# Patient Record
Sex: MALE | Race: WHITE | NOT HISPANIC OR LATINO | Employment: OTHER | URBAN - METROPOLITAN AREA
[De-identification: names, ages, dates, MRNs, and addresses within clinical notes are randomized per-mention and may not be internally consistent; named-entity substitution may affect disease eponyms.]

---

## 2021-08-04 ENCOUNTER — ANESTHESIA EVENT (OUTPATIENT)
Dept: SURGERY | Facility: MEDICAL CENTER | Age: 66
DRG: 482 | End: 2021-08-04
Payer: MEDICARE

## 2021-08-04 ENCOUNTER — HOSPITAL ENCOUNTER (INPATIENT)
Facility: MEDICAL CENTER | Age: 66
LOS: 2 days | DRG: 482 | End: 2021-08-06
Attending: EMERGENCY MEDICINE | Admitting: STUDENT IN AN ORGANIZED HEALTH CARE EDUCATION/TRAINING PROGRAM
Payer: MEDICARE

## 2021-08-04 ENCOUNTER — APPOINTMENT (OUTPATIENT)
Dept: RADIOLOGY | Facility: MEDICAL CENTER | Age: 66
DRG: 482 | End: 2021-08-04
Attending: ORTHOPAEDIC SURGERY
Payer: MEDICARE

## 2021-08-04 ENCOUNTER — APPOINTMENT (OUTPATIENT)
Dept: RADIOLOGY | Facility: MEDICAL CENTER | Age: 66
DRG: 482 | End: 2021-08-04
Attending: EMERGENCY MEDICINE
Payer: MEDICARE

## 2021-08-04 ENCOUNTER — HOSPITAL ENCOUNTER (OUTPATIENT)
Dept: RADIOLOGY | Facility: MEDICAL CENTER | Age: 66
End: 2021-08-04

## 2021-08-04 ENCOUNTER — ANESTHESIA (OUTPATIENT)
Dept: SURGERY | Facility: MEDICAL CENTER | Age: 66
DRG: 482 | End: 2021-08-04
Payer: MEDICARE

## 2021-08-04 DIAGNOSIS — S72.002A CLOSED FRACTURE OF LEFT HIP, INITIAL ENCOUNTER (HCC): ICD-10-CM

## 2021-08-04 PROBLEM — D72.829 LEUKOCYTOSIS: Status: ACTIVE | Noted: 2021-08-04

## 2021-08-04 PROBLEM — I25.810 CAD (CORONARY ARTERY DISEASE) OF ARTERY BYPASS GRAFT: Status: ACTIVE | Noted: 2021-08-04

## 2021-08-04 PROBLEM — E03.9 HYPOTHYROIDISM: Status: ACTIVE | Noted: 2021-08-04

## 2021-08-04 PROBLEM — K21.9 GERD (GASTROESOPHAGEAL REFLUX DISEASE): Status: ACTIVE | Noted: 2021-08-04

## 2021-08-04 PROBLEM — J44.9 COPD (CHRONIC OBSTRUCTIVE PULMONARY DISEASE) (HCC): Status: ACTIVE | Noted: 2021-08-04

## 2021-08-04 PROBLEM — I10 ESSENTIAL HYPERTENSION: Status: ACTIVE | Noted: 2021-08-04

## 2021-08-04 LAB
ALBUMIN SERPL BCP-MCNC: 4.1 G/DL (ref 3.2–4.9)
ALBUMIN/GLOB SERPL: 1.5 G/DL
ALP SERPL-CCNC: 137 U/L (ref 30–99)
ALT SERPL-CCNC: 13 U/L (ref 2–50)
ANION GAP SERPL CALC-SCNC: 13 MMOL/L (ref 7–16)
AST SERPL-CCNC: 18 U/L (ref 12–45)
BASOPHILS # BLD AUTO: 0.2 % (ref 0–1.8)
BASOPHILS # BLD: 0.04 K/UL (ref 0–0.12)
BILIRUB SERPL-MCNC: 0.4 MG/DL (ref 0.1–1.5)
BUN SERPL-MCNC: 8 MG/DL (ref 8–22)
CALCIUM SERPL-MCNC: 8.9 MG/DL (ref 8.5–10.5)
CHLORIDE SERPL-SCNC: 110 MMOL/L (ref 96–112)
CO2 SERPL-SCNC: 20 MMOL/L (ref 20–33)
CREAT SERPL-MCNC: 0.97 MG/DL (ref 0.5–1.4)
EKG IMPRESSION: NORMAL
EOSINOPHIL # BLD AUTO: 0.01 K/UL (ref 0–0.51)
EOSINOPHIL NFR BLD: 0 % (ref 0–6.9)
ERYTHROCYTE [DISTWIDTH] IN BLOOD BY AUTOMATED COUNT: 44.3 FL (ref 35.9–50)
GLOBULIN SER CALC-MCNC: 2.7 G/DL (ref 1.9–3.5)
GLUCOSE SERPL-MCNC: 110 MG/DL (ref 65–99)
HCT VFR BLD AUTO: 40.9 % (ref 42–52)
HGB BLD-MCNC: 13.1 G/DL (ref 14–18)
IMM GRANULOCYTES # BLD AUTO: 0.19 K/UL (ref 0–0.11)
IMM GRANULOCYTES NFR BLD AUTO: 0.9 % (ref 0–0.9)
LYMPHOCYTES # BLD AUTO: 0.73 K/UL (ref 1–4.8)
LYMPHOCYTES NFR BLD: 3.6 % (ref 22–41)
MCH RBC QN AUTO: 27.8 PG (ref 27–33)
MCHC RBC AUTO-ENTMCNC: 32 G/DL (ref 33.7–35.3)
MCV RBC AUTO: 86.8 FL (ref 81.4–97.8)
MONOCYTES # BLD AUTO: 1.02 K/UL (ref 0–0.85)
MONOCYTES NFR BLD AUTO: 5 % (ref 0–13.4)
NEUTROPHILS # BLD AUTO: 18.29 K/UL (ref 1.82–7.42)
NEUTROPHILS NFR BLD: 90.3 % (ref 44–72)
NRBC # BLD AUTO: 0 K/UL
NRBC BLD-RTO: 0 /100 WBC
PLATELET # BLD AUTO: 195 K/UL (ref 164–446)
PMV BLD AUTO: 11.1 FL (ref 9–12.9)
POTASSIUM SERPL-SCNC: 4.3 MMOL/L (ref 3.6–5.5)
PROT SERPL-MCNC: 6.8 G/DL (ref 6–8.2)
RBC # BLD AUTO: 4.71 M/UL (ref 4.7–6.1)
SODIUM SERPL-SCNC: 143 MMOL/L (ref 135–145)
WBC # BLD AUTO: 20.3 K/UL (ref 4.8–10.8)

## 2021-08-04 PROCEDURE — 27245 TREAT THIGH FRACTURE: CPT | Mod: ASROC,LT | Performed by: PHYSICIAN ASSISTANT

## 2021-08-04 PROCEDURE — 3E0T3BZ INTRODUCTION OF ANESTHETIC AGENT INTO PERIPHERAL NERVES AND PLEXI, PERCUTANEOUS APPROACH: ICD-10-PCS | Performed by: STUDENT IN AN ORGANIZED HEALTH CARE EDUCATION/TRAINING PROGRAM

## 2021-08-04 PROCEDURE — 700105 HCHG RX REV CODE 258: Performed by: STUDENT IN AN ORGANIZED HEALTH CARE EDUCATION/TRAINING PROGRAM

## 2021-08-04 PROCEDURE — 80053 COMPREHEN METABOLIC PANEL: CPT

## 2021-08-04 PROCEDURE — 302128 INFUSION PUMP: Performed by: STUDENT IN AN ORGANIZED HEALTH CARE EDUCATION/TRAINING PROGRAM

## 2021-08-04 PROCEDURE — 770006 HCHG ROOM/CARE - MED/SURG/GYN SEMI*

## 2021-08-04 PROCEDURE — 73110 X-RAY EXAM OF WRIST: CPT | Mod: RT

## 2021-08-04 PROCEDURE — 700111 HCHG RX REV CODE 636 W/ 250 OVERRIDE (IP): Performed by: STUDENT IN AN ORGANIZED HEALTH CARE EDUCATION/TRAINING PROGRAM

## 2021-08-04 PROCEDURE — 27245 TREAT THIGH FRACTURE: CPT | Mod: LT | Performed by: ORTHOPAEDIC SURGERY

## 2021-08-04 PROCEDURE — 64447 NJX AA&/STRD FEMORAL NRV IMG: CPT | Performed by: ORTHOPAEDIC SURGERY

## 2021-08-04 PROCEDURE — 700102 HCHG RX REV CODE 250 W/ 637 OVERRIDE(OP): Performed by: STUDENT IN AN ORGANIZED HEALTH CARE EDUCATION/TRAINING PROGRAM

## 2021-08-04 PROCEDURE — 160035 HCHG PACU - 1ST 60 MINS PHASE I: Performed by: ORTHOPAEDIC SURGERY

## 2021-08-04 PROCEDURE — 96374 THER/PROPH/DIAG INJ IV PUSH: CPT

## 2021-08-04 PROCEDURE — C1713 ANCHOR/SCREW BN/BN,TIS/BN: HCPCS | Performed by: ORTHOPAEDIC SURGERY

## 2021-08-04 PROCEDURE — 99223 1ST HOSP IP/OBS HIGH 75: CPT | Mod: AI | Performed by: STUDENT IN AN ORGANIZED HEALTH CARE EDUCATION/TRAINING PROGRAM

## 2021-08-04 PROCEDURE — 700101 HCHG RX REV CODE 250: Performed by: STUDENT IN AN ORGANIZED HEALTH CARE EDUCATION/TRAINING PROGRAM

## 2021-08-04 PROCEDURE — 36415 COLL VENOUS BLD VENIPUNCTURE: CPT

## 2021-08-04 PROCEDURE — 160041 HCHG SURGERY MINUTES - EA ADDL 1 MIN LEVEL 4: Performed by: ORTHOPAEDIC SURGERY

## 2021-08-04 PROCEDURE — A9270 NON-COVERED ITEM OR SERVICE: HCPCS | Performed by: STUDENT IN AN ORGANIZED HEALTH CARE EDUCATION/TRAINING PROGRAM

## 2021-08-04 PROCEDURE — 160029 HCHG SURGERY MINUTES - 1ST 30 MINS LEVEL 4: Performed by: ORTHOPAEDIC SURGERY

## 2021-08-04 PROCEDURE — 160002 HCHG RECOVERY MINUTES (STAT): Performed by: ORTHOPAEDIC SURGERY

## 2021-08-04 PROCEDURE — 85025 COMPLETE CBC W/AUTO DIFF WBC: CPT

## 2021-08-04 PROCEDURE — 160009 HCHG ANES TIME/MIN: Performed by: ORTHOPAEDIC SURGERY

## 2021-08-04 PROCEDURE — 500891 HCHG PACK, ORTHO MAJOR: Performed by: ORTHOPAEDIC SURGERY

## 2021-08-04 PROCEDURE — 73501 X-RAY EXAM HIP UNI 1 VIEW: CPT | Mod: LT

## 2021-08-04 PROCEDURE — 99285 EMERGENCY DEPT VISIT HI MDM: CPT

## 2021-08-04 PROCEDURE — 0QS736Z REPOSITION LEFT UPPER FEMUR WITH INTRAMEDULLARY INTERNAL FIXATION DEVICE, PERCUTANEOUS APPROACH: ICD-10-PCS | Performed by: ORTHOPAEDIC SURGERY

## 2021-08-04 PROCEDURE — 700111 HCHG RX REV CODE 636 W/ 250 OVERRIDE (IP): Performed by: EMERGENCY MEDICINE

## 2021-08-04 PROCEDURE — 93005 ELECTROCARDIOGRAM TRACING: CPT | Performed by: EMERGENCY MEDICINE

## 2021-08-04 PROCEDURE — 96375 TX/PRO/DX INJ NEW DRUG ADDON: CPT

## 2021-08-04 PROCEDURE — 160048 HCHG OR STATISTICAL LEVEL 1-5: Performed by: ORTHOPAEDIC SURGERY

## 2021-08-04 PROCEDURE — 501838 HCHG SUTURE GENERAL: Performed by: ORTHOPAEDIC SURGERY

## 2021-08-04 PROCEDURE — 99222 1ST HOSP IP/OBS MODERATE 55: CPT | Mod: 57 | Performed by: ORTHOPAEDIC SURGERY

## 2021-08-04 DEVICE — NAIL HIP 127.5 DEGREE 10MM X 180MM (4TX2=8): Type: IMPLANTABLE DEVICE | Site: HIP | Status: FUNCTIONAL

## 2021-08-04 DEVICE — SCREW LAG 10.5MM X 95MM (4TX2=8): Type: IMPLANTABLE DEVICE | Site: HIP | Status: FUNCTIONAL

## 2021-08-04 DEVICE — SCREW CROSS LOCK 5MM X 37.5MM (4TX5=20): Type: IMPLANTABLE DEVICE | Site: HIP | Status: FUNCTIONAL

## 2021-08-04 RX ORDER — ATORVASTATIN CALCIUM 40 MG/1
40 TABLET, FILM COATED ORAL NIGHTLY
Status: DISCONTINUED | OUTPATIENT
Start: 2021-08-04 | End: 2021-08-06 | Stop reason: HOSPADM

## 2021-08-04 RX ORDER — MEPERIDINE HYDROCHLORIDE 25 MG/ML
12.5 INJECTION INTRAMUSCULAR; INTRAVENOUS; SUBCUTANEOUS
Status: DISCONTINUED | OUTPATIENT
Start: 2021-08-04 | End: 2021-08-04 | Stop reason: HOSPADM

## 2021-08-04 RX ORDER — SODIUM CHLORIDE, SODIUM LACTATE, POTASSIUM CHLORIDE, CALCIUM CHLORIDE 600; 310; 30; 20 MG/100ML; MG/100ML; MG/100ML; MG/100ML
INJECTION, SOLUTION INTRAVENOUS CONTINUOUS
Status: DISCONTINUED | OUTPATIENT
Start: 2021-08-04 | End: 2021-08-05

## 2021-08-04 RX ORDER — BUTALBITAL, ACETAMINOPHEN AND CAFFEINE 300; 40; 50 MG/1; MG/1; MG/1
1 CAPSULE ORAL EVERY 4 HOURS PRN
Status: ON HOLD | COMMUNITY
End: 2021-08-13

## 2021-08-04 RX ORDER — ALBUTEROL SULFATE 90 UG/1
2 AEROSOL, METERED RESPIRATORY (INHALATION) EVERY 6 HOURS PRN
Status: DISCONTINUED | OUTPATIENT
Start: 2021-08-04 | End: 2021-08-06 | Stop reason: HOSPADM

## 2021-08-04 RX ORDER — CELECOXIB 200 MG/1
400 CAPSULE ORAL ONCE
Status: COMPLETED | OUTPATIENT
Start: 2021-08-04 | End: 2021-08-04

## 2021-08-04 RX ORDER — MORPHINE SULFATE 4 MG/ML
2 INJECTION, SOLUTION INTRAMUSCULAR; INTRAVENOUS
Status: DISCONTINUED | OUTPATIENT
Start: 2021-08-04 | End: 2021-08-04

## 2021-08-04 RX ORDER — MORPHINE SULFATE 4 MG/ML
2 INJECTION, SOLUTION INTRAMUSCULAR; INTRAVENOUS EVERY 6 HOURS PRN
Status: DISCONTINUED | OUTPATIENT
Start: 2021-08-05 | End: 2021-08-06

## 2021-08-04 RX ORDER — PANTOPRAZOLE SODIUM 40 MG/1
40 TABLET, DELAYED RELEASE ORAL EVERY MORNING
Status: ON HOLD | COMMUNITY
End: 2021-08-13

## 2021-08-04 RX ORDER — NITROGLYCERIN 0.4 MG/1
0.4 TABLET SUBLINGUAL
Status: DISCONTINUED | OUTPATIENT
Start: 2021-08-04 | End: 2021-08-06 | Stop reason: HOSPADM

## 2021-08-04 RX ORDER — FAMOTIDINE 40 MG/1
40 TABLET, FILM COATED ORAL EVERY EVENING
Status: ON HOLD | COMMUNITY
End: 2021-08-13 | Stop reason: SDUPTHER

## 2021-08-04 RX ORDER — ONDANSETRON 2 MG/ML
4 INJECTION INTRAMUSCULAR; INTRAVENOUS EVERY 4 HOURS PRN
Status: DISCONTINUED | OUTPATIENT
Start: 2021-08-04 | End: 2021-08-06 | Stop reason: HOSPADM

## 2021-08-04 RX ORDER — METOCLOPRAMIDE HYDROCHLORIDE 5 MG/5ML
10 SOLUTION ORAL 4 TIMES DAILY
Status: ON HOLD | COMMUNITY
End: 2021-08-13

## 2021-08-04 RX ORDER — SERTRALINE HYDROCHLORIDE 100 MG/1
100 TABLET, FILM COATED ORAL EVERY EVENING
COMMUNITY
End: 2022-11-22

## 2021-08-04 RX ORDER — ACETAMINOPHEN 500 MG
1000 TABLET ORAL ONCE
Status: COMPLETED | OUTPATIENT
Start: 2021-08-04 | End: 2021-08-04

## 2021-08-04 RX ORDER — METOPROLOL TARTRATE 50 MG/1
50 TABLET, FILM COATED ORAL 2 TIMES DAILY
Status: DISCONTINUED | OUTPATIENT
Start: 2021-08-04 | End: 2021-08-06 | Stop reason: HOSPADM

## 2021-08-04 RX ORDER — HYDROMORPHONE HYDROCHLORIDE 1 MG/ML
0.4 INJECTION, SOLUTION INTRAMUSCULAR; INTRAVENOUS; SUBCUTANEOUS
Status: DISCONTINUED | OUTPATIENT
Start: 2021-08-04 | End: 2021-08-04 | Stop reason: HOSPADM

## 2021-08-04 RX ORDER — CLONIDINE HYDROCHLORIDE 0.1 MG/1
0.1 TABLET ORAL EVERY 6 HOURS PRN
Status: DISCONTINUED | OUTPATIENT
Start: 2021-08-04 | End: 2021-08-06 | Stop reason: HOSPADM

## 2021-08-04 RX ORDER — DIPHENHYDRAMINE HYDROCHLORIDE 50 MG/ML
12.5 INJECTION INTRAMUSCULAR; INTRAVENOUS
Status: DISCONTINUED | OUTPATIENT
Start: 2021-08-04 | End: 2021-08-04 | Stop reason: HOSPADM

## 2021-08-04 RX ORDER — OMEPRAZOLE 20 MG/1
20 CAPSULE, DELAYED RELEASE ORAL EVERY MORNING
Status: DISCONTINUED | OUTPATIENT
Start: 2021-08-04 | End: 2021-08-06 | Stop reason: HOSPADM

## 2021-08-04 RX ORDER — HYDRALAZINE HYDROCHLORIDE 20 MG/ML
5 INJECTION INTRAMUSCULAR; INTRAVENOUS
Status: DISCONTINUED | OUTPATIENT
Start: 2021-08-04 | End: 2021-08-04 | Stop reason: HOSPADM

## 2021-08-04 RX ORDER — ISOSORBIDE MONONITRATE 60 MG/1
60 TABLET, EXTENDED RELEASE ORAL 2 TIMES DAILY
Status: DISCONTINUED | OUTPATIENT
Start: 2021-08-04 | End: 2021-08-06 | Stop reason: HOSPADM

## 2021-08-04 RX ORDER — CYCLOBENZAPRINE HCL 10 MG
10 TABLET ORAL 3 TIMES DAILY PRN
Status: ON HOLD | COMMUNITY
End: 2021-08-13 | Stop reason: SDUPTHER

## 2021-08-04 RX ORDER — OXYCODONE HCL 5 MG/5 ML
10 SOLUTION, ORAL ORAL
Status: COMPLETED | OUTPATIENT
Start: 2021-08-04 | End: 2021-08-04

## 2021-08-04 RX ORDER — TAMSULOSIN HYDROCHLORIDE 0.4 MG/1
0.4 CAPSULE ORAL EVERY EVENING
Status: ON HOLD | COMMUNITY
End: 2021-08-13 | Stop reason: SDUPTHER

## 2021-08-04 RX ORDER — POTASSIUM CHLORIDE 20 MEQ/1
20 TABLET, EXTENDED RELEASE ORAL EVERY MORNING
Status: ON HOLD | COMMUNITY
End: 2021-08-13

## 2021-08-04 RX ORDER — PROCHLORPERAZINE EDISYLATE 5 MG/ML
5-10 INJECTION INTRAMUSCULAR; INTRAVENOUS EVERY 4 HOURS PRN
Status: DISCONTINUED | OUTPATIENT
Start: 2021-08-04 | End: 2021-08-06 | Stop reason: HOSPADM

## 2021-08-04 RX ORDER — EPINEPHRINE 0.3 MG/.3ML
0.3 INJECTION SUBCUTANEOUS PRN
COMMUNITY

## 2021-08-04 RX ORDER — ONDANSETRON 4 MG/1
4 TABLET, ORALLY DISINTEGRATING ORAL EVERY 4 HOURS PRN
Status: DISCONTINUED | OUTPATIENT
Start: 2021-08-04 | End: 2021-08-06 | Stop reason: HOSPADM

## 2021-08-04 RX ORDER — HALOPERIDOL 5 MG/ML
1 INJECTION INTRAMUSCULAR
Status: DISCONTINUED | OUTPATIENT
Start: 2021-08-04 | End: 2021-08-04 | Stop reason: HOSPADM

## 2021-08-04 RX ORDER — LABETALOL HYDROCHLORIDE 5 MG/ML
5 INJECTION, SOLUTION INTRAVENOUS
Status: DISCONTINUED | OUTPATIENT
Start: 2021-08-04 | End: 2021-08-04 | Stop reason: HOSPADM

## 2021-08-04 RX ORDER — TAMSULOSIN HYDROCHLORIDE 0.4 MG/1
0.4 CAPSULE ORAL EVERY EVENING
Status: DISCONTINUED | OUTPATIENT
Start: 2021-08-04 | End: 2021-08-06 | Stop reason: HOSPADM

## 2021-08-04 RX ORDER — PROMETHAZINE HYDROCHLORIDE 25 MG/1
12.5-25 SUPPOSITORY RECTAL EVERY 4 HOURS PRN
Status: DISCONTINUED | OUTPATIENT
Start: 2021-08-04 | End: 2021-08-06 | Stop reason: HOSPADM

## 2021-08-04 RX ORDER — MORPHINE SULFATE 15 MG/1
15 TABLET ORAL 3 TIMES DAILY
Status: ON HOLD | COMMUNITY
End: 2021-08-13 | Stop reason: SDUPTHER

## 2021-08-04 RX ORDER — IPRATROPIUM BROMIDE AND ALBUTEROL SULFATE 2.5; .5 MG/3ML; MG/3ML
3 SOLUTION RESPIRATORY (INHALATION)
Status: DISCONTINUED | OUTPATIENT
Start: 2021-08-04 | End: 2021-08-04 | Stop reason: HOSPADM

## 2021-08-04 RX ORDER — ONDANSETRON 2 MG/ML
4 INJECTION INTRAMUSCULAR; INTRAVENOUS
Status: DISCONTINUED | OUTPATIENT
Start: 2021-08-04 | End: 2021-08-04 | Stop reason: HOSPADM

## 2021-08-04 RX ORDER — AMOXICILLIN 250 MG
2 CAPSULE ORAL 2 TIMES DAILY
Status: DISCONTINUED | OUTPATIENT
Start: 2021-08-05 | End: 2021-08-06 | Stop reason: HOSPADM

## 2021-08-04 RX ORDER — ONDANSETRON 2 MG/ML
INJECTION INTRAMUSCULAR; INTRAVENOUS PRN
Status: DISCONTINUED | OUTPATIENT
Start: 2021-08-04 | End: 2021-08-04 | Stop reason: SURG

## 2021-08-04 RX ORDER — LABETALOL HYDROCHLORIDE 5 MG/ML
10 INJECTION, SOLUTION INTRAVENOUS EVERY 4 HOURS PRN
Status: DISCONTINUED | OUTPATIENT
Start: 2021-08-04 | End: 2021-08-06 | Stop reason: HOSPADM

## 2021-08-04 RX ORDER — NITROGLYCERIN 0.4 MG/1
0.4 TABLET SUBLINGUAL
COMMUNITY
End: 2021-12-29 | Stop reason: SDUPTHER

## 2021-08-04 RX ORDER — GABAPENTIN 100 MG/1
100 CAPSULE ORAL 2 TIMES DAILY
COMMUNITY

## 2021-08-04 RX ORDER — CLONIDINE HYDROCHLORIDE 0.1 MG/1
0.1 TABLET ORAL EVERY MORNING
Status: DISCONTINUED | OUTPATIENT
Start: 2021-08-05 | End: 2021-08-06 | Stop reason: HOSPADM

## 2021-08-04 RX ORDER — ACETAMINOPHEN 325 MG/1
650 TABLET ORAL 3 TIMES DAILY
Status: DISCONTINUED | OUTPATIENT
Start: 2021-08-04 | End: 2021-08-06 | Stop reason: HOSPADM

## 2021-08-04 RX ORDER — LEVOTHYROXINE SODIUM 0.05 MG/1
50 TABLET ORAL
COMMUNITY
End: 2021-12-29

## 2021-08-04 RX ORDER — LEVOTHYROXINE SODIUM 0.03 MG/1
50 TABLET ORAL SEE ADMIN INSTRUCTIONS
COMMUNITY
End: 2022-01-03

## 2021-08-04 RX ORDER — METOPROLOL TARTRATE 50 MG/1
50 TABLET, FILM COATED ORAL 2 TIMES DAILY
Status: ON HOLD | COMMUNITY
End: 2021-08-13 | Stop reason: SDUPTHER

## 2021-08-04 RX ORDER — DEXAMETHASONE SODIUM PHOSPHATE 4 MG/ML
INJECTION, SOLUTION INTRA-ARTICULAR; INTRALESIONAL; INTRAMUSCULAR; INTRAVENOUS; SOFT TISSUE PRN
Status: DISCONTINUED | OUTPATIENT
Start: 2021-08-04 | End: 2021-08-04 | Stop reason: SURG

## 2021-08-04 RX ORDER — ROPIVACAINE HYDROCHLORIDE 5 MG/ML
INJECTION, SOLUTION EPIDURAL; INFILTRATION; PERINEURAL
Status: DISCONTINUED | OUTPATIENT
Start: 2021-08-04 | End: 2021-08-04 | Stop reason: SURG

## 2021-08-04 RX ORDER — ALBUTEROL SULFATE 90 UG/1
2 AEROSOL, METERED RESPIRATORY (INHALATION) EVERY 6 HOURS PRN
COMMUNITY

## 2021-08-04 RX ORDER — HYDROMORPHONE HYDROCHLORIDE 1 MG/ML
0.2 INJECTION, SOLUTION INTRAMUSCULAR; INTRAVENOUS; SUBCUTANEOUS
Status: DISCONTINUED | OUTPATIENT
Start: 2021-08-04 | End: 2021-08-04 | Stop reason: HOSPADM

## 2021-08-04 RX ORDER — FAMOTIDINE 20 MG/1
40 TABLET, FILM COATED ORAL EVERY EVENING
Status: DISCONTINUED | OUTPATIENT
Start: 2021-08-04 | End: 2021-08-06 | Stop reason: HOSPADM

## 2021-08-04 RX ORDER — SERTRALINE HYDROCHLORIDE 100 MG/1
100 TABLET, FILM COATED ORAL EVERY EVENING
Status: DISCONTINUED | OUTPATIENT
Start: 2021-08-04 | End: 2021-08-06 | Stop reason: HOSPADM

## 2021-08-04 RX ORDER — GABAPENTIN 300 MG/1
600 CAPSULE ORAL ONCE
Status: COMPLETED | OUTPATIENT
Start: 2021-08-04 | End: 2021-08-04

## 2021-08-04 RX ORDER — POLYETHYLENE GLYCOL 3350 17 G/17G
1 POWDER, FOR SOLUTION ORAL
Status: DISCONTINUED | OUTPATIENT
Start: 2021-08-04 | End: 2021-08-06 | Stop reason: HOSPADM

## 2021-08-04 RX ORDER — CLOPIDOGREL BISULFATE 75 MG/1
75 TABLET ORAL EVERY MORNING
Status: ON HOLD | COMMUNITY
End: 2021-08-13 | Stop reason: SDUPTHER

## 2021-08-04 RX ORDER — BISACODYL 10 MG
10 SUPPOSITORY, RECTAL RECTAL
Status: DISCONTINUED | OUTPATIENT
Start: 2021-08-04 | End: 2021-08-06 | Stop reason: HOSPADM

## 2021-08-04 RX ORDER — ISOSORBIDE MONONITRATE 60 MG/1
60 TABLET, EXTENDED RELEASE ORAL 2 TIMES DAILY
Status: ON HOLD | COMMUNITY
End: 2021-08-13 | Stop reason: SDUPTHER

## 2021-08-04 RX ORDER — BUTALBITAL, ACETAMINOPHEN AND CAFFEINE 50; 325; 40 MG/1; MG/1; MG/1
1 TABLET ORAL EVERY 4 HOURS PRN
Status: DISCONTINUED | OUTPATIENT
Start: 2021-08-04 | End: 2021-08-06 | Stop reason: HOSPADM

## 2021-08-04 RX ORDER — ATORVASTATIN CALCIUM 40 MG/1
40 TABLET, FILM COATED ORAL NIGHTLY
Status: ON HOLD | COMMUNITY
End: 2021-08-13 | Stop reason: SDUPTHER

## 2021-08-04 RX ORDER — CLONIDINE HYDROCHLORIDE 0.1 MG/1
0.1 TABLET ORAL EVERY MORNING
Status: ON HOLD | COMMUNITY
End: 2021-08-13 | Stop reason: SDUPTHER

## 2021-08-04 RX ORDER — PROMETHAZINE HYDROCHLORIDE 25 MG/1
12.5-25 TABLET ORAL EVERY 4 HOURS PRN
Status: DISCONTINUED | OUTPATIENT
Start: 2021-08-04 | End: 2021-08-06 | Stop reason: HOSPADM

## 2021-08-04 RX ORDER — HYDROMORPHONE HYDROCHLORIDE 1 MG/ML
0.1 INJECTION, SOLUTION INTRAMUSCULAR; INTRAVENOUS; SUBCUTANEOUS
Status: DISCONTINUED | OUTPATIENT
Start: 2021-08-04 | End: 2021-08-04 | Stop reason: HOSPADM

## 2021-08-04 RX ORDER — LEVOTHYROXINE SODIUM 0.05 MG/1
50 TABLET ORAL
Status: DISCONTINUED | OUTPATIENT
Start: 2021-08-07 | End: 2021-08-06 | Stop reason: HOSPADM

## 2021-08-04 RX ORDER — ENALAPRILAT 1.25 MG/ML
1.25 INJECTION INTRAVENOUS EVERY 6 HOURS PRN
Status: DISCONTINUED | OUTPATIENT
Start: 2021-08-04 | End: 2021-08-06 | Stop reason: HOSPADM

## 2021-08-04 RX ORDER — OXYCODONE HCL 5 MG/5 ML
5 SOLUTION, ORAL ORAL
Status: COMPLETED | OUTPATIENT
Start: 2021-08-04 | End: 2021-08-04

## 2021-08-04 RX ORDER — KETAMINE HYDROCHLORIDE 50 MG/ML
INJECTION, SOLUTION INTRAMUSCULAR; INTRAVENOUS PRN
Status: DISCONTINUED | OUTPATIENT
Start: 2021-08-04 | End: 2021-08-04 | Stop reason: SURG

## 2021-08-04 RX ORDER — CEFAZOLIN SODIUM 1 G/3ML
INJECTION, POWDER, FOR SOLUTION INTRAMUSCULAR; INTRAVENOUS PRN
Status: DISCONTINUED | OUTPATIENT
Start: 2021-08-04 | End: 2021-08-04 | Stop reason: SURG

## 2021-08-04 RX ORDER — LEVOTHYROXINE SODIUM 0.03 MG/1
25 TABLET ORAL
Status: DISCONTINUED | OUTPATIENT
Start: 2021-08-05 | End: 2021-08-06 | Stop reason: HOSPADM

## 2021-08-04 RX ADMIN — FAMOTIDINE 40 MG: 20 TABLET ORAL at 18:21

## 2021-08-04 RX ADMIN — FENTANYL CITRATE 50 MCG: 50 INJECTION INTRAMUSCULAR; INTRAVENOUS at 22:16

## 2021-08-04 RX ADMIN — OXYCODONE HYDROCHLORIDE 10 MG: 5 SOLUTION ORAL at 22:14

## 2021-08-04 RX ADMIN — FENTANYL CITRATE 50 MCG: 50 INJECTION, SOLUTION INTRAMUSCULAR; INTRAVENOUS at 20:46

## 2021-08-04 RX ADMIN — MIDAZOLAM 2 MG: 1 INJECTION INTRAMUSCULAR; INTRAVENOUS at 21:13

## 2021-08-04 RX ADMIN — ROPIVACAINE HYDROCHLORIDE 30 ML: 5 INJECTION, SOLUTION EPIDURAL; INFILTRATION; PERINEURAL at 21:47

## 2021-08-04 RX ADMIN — PROPOFOL 100 MG: 10 INJECTION, EMULSION INTRAVENOUS at 21:14

## 2021-08-04 RX ADMIN — MORPHINE SULFATE 2 MG: 4 INJECTION INTRAVENOUS at 18:19

## 2021-08-04 RX ADMIN — DEXAMETHASONE SODIUM PHOSPHATE 4 MG: 4 INJECTION, SOLUTION INTRA-ARTICULAR; INTRALESIONAL; INTRAMUSCULAR; INTRAVENOUS; SOFT TISSUE at 21:23

## 2021-08-04 RX ADMIN — KETAMINE HYDROCHLORIDE 40 MG: 50 INJECTION INTRAMUSCULAR; INTRAVENOUS at 21:14

## 2021-08-04 RX ADMIN — ROCURONIUM BROMIDE 50 MG: 10 INJECTION, SOLUTION INTRAVENOUS at 21:14

## 2021-08-04 RX ADMIN — GABAPENTIN 600 MG: 300 CAPSULE ORAL at 20:49

## 2021-08-04 RX ADMIN — METOPROLOL TARTRATE 50 MG: 50 TABLET, FILM COATED ORAL at 18:21

## 2021-08-04 RX ADMIN — SODIUM CHLORIDE, POTASSIUM CHLORIDE, SODIUM LACTATE AND CALCIUM CHLORIDE: 600; 310; 30; 20 INJECTION, SOLUTION INTRAVENOUS at 18:23

## 2021-08-04 RX ADMIN — FENTANYL CITRATE 100 MCG: 50 INJECTION, SOLUTION INTRAMUSCULAR; INTRAVENOUS at 21:13

## 2021-08-04 RX ADMIN — FENTANYL CITRATE 50 MCG: 50 INJECTION, SOLUTION INTRAMUSCULAR; INTRAVENOUS at 16:35

## 2021-08-04 RX ADMIN — CEFAZOLIN 2 G: 330 INJECTION, POWDER, FOR SOLUTION INTRAMUSCULAR; INTRAVENOUS at 21:15

## 2021-08-04 RX ADMIN — FENTANYL CITRATE 50 MCG: 50 INJECTION INTRAMUSCULAR; INTRAVENOUS at 22:26

## 2021-08-04 RX ADMIN — ONDANSETRON 4 MG: 2 INJECTION INTRAMUSCULAR; INTRAVENOUS at 21:23

## 2021-08-04 RX ADMIN — OMEPRAZOLE 20 MG: 20 CAPSULE, DELAYED RELEASE ORAL at 18:21

## 2021-08-04 RX ADMIN — ACETAMINOPHEN 1000 MG: 500 TABLET, FILM COATED ORAL at 20:49

## 2021-08-04 RX ADMIN — CELECOXIB 400 MG: 200 CAPSULE ORAL at 20:49

## 2021-08-04 ASSESSMENT — COGNITIVE AND FUNCTIONAL STATUS - GENERAL
DAILY ACTIVITIY SCORE: 19
MOVING FROM LYING ON BACK TO SITTING ON SIDE OF FLAT BED: A LOT
TURNING FROM BACK TO SIDE WHILE IN FLAT BAD: A LITTLE
HELP NEEDED FOR BATHING: A LITTLE
DRESSING REGULAR LOWER BODY CLOTHING: A LOT
SUGGESTED CMS G CODE MODIFIER DAILY ACTIVITY: CK
SUGGESTED CMS G CODE MODIFIER MOBILITY: CL
TOILETING: A LITTLE
STANDING UP FROM CHAIR USING ARMS: A LOT
DRESSING REGULAR UPPER BODY CLOTHING: A LITTLE
MOVING TO AND FROM BED TO CHAIR: A LOT
CLIMB 3 TO 5 STEPS WITH RAILING: A LOT
WALKING IN HOSPITAL ROOM: A LOT
MOBILITY SCORE: 13

## 2021-08-04 ASSESSMENT — PAIN DESCRIPTION - PAIN TYPE
TYPE: ACUTE PAIN
TYPE: ACUTE PAIN
TYPE: ACUTE PAIN;SURGICAL PAIN

## 2021-08-04 ASSESSMENT — LIFESTYLE VARIABLES
HOW MANY TIMES IN THE PAST YEAR HAVE YOU HAD 5 OR MORE DRINKS IN A DAY: 0
CONSUMPTION TOTAL: NEGATIVE
AVERAGE NUMBER OF DAYS PER WEEK YOU HAVE A DRINK CONTAINING ALCOHOL: 0
ON A TYPICAL DAY WHEN YOU DRINK ALCOHOL HOW MANY DRINKS DO YOU HAVE: 0
HAVE PEOPLE ANNOYED YOU BY CRITICIZING YOUR DRINKING: NO
TOTAL SCORE: 0
ALCOHOL_USE: NO
EVER HAD A DRINK FIRST THING IN THE MORNING TO STEADY YOUR NERVES TO GET RID OF A HANGOVER: NO
TOTAL SCORE: 0
TOTAL SCORE: 0
HAVE YOU EVER FELT YOU SHOULD CUT DOWN ON YOUR DRINKING: NO
EVER FELT BAD OR GUILTY ABOUT YOUR DRINKING: NO

## 2021-08-04 ASSESSMENT — ENCOUNTER SYMPTOMS
PSYCHIATRIC NEGATIVE: 1
MUSCULOSKELETAL NEGATIVE: 1
CARDIOVASCULAR NEGATIVE: 1
RESPIRATORY NEGATIVE: 1
NEUROLOGICAL NEGATIVE: 1
GASTROINTESTINAL NEGATIVE: 1
EYES NEGATIVE: 1
CONSTITUTIONAL NEGATIVE: 1

## 2021-08-04 ASSESSMENT — PATIENT HEALTH QUESTIONNAIRE - PHQ9
SUM OF ALL RESPONSES TO PHQ QUESTIONS 1-9: 8
3. TROUBLE FALLING OR STAYING ASLEEP OR SLEEPING TOO MUCH: SEVERAL DAYS
1. LITTLE INTEREST OR PLEASURE IN DOING THINGS: NEARLY EVERY DAY
2. FEELING DOWN, DEPRESSED, IRRITABLE, OR HOPELESS: SEVERAL DAYS
9. THOUGHTS THAT YOU WOULD BE BETTER OFF DEAD, OR OF HURTING YOURSELF: NOT AT ALL
4. FEELING TIRED OR HAVING LITTLE ENERGY: SEVERAL DAYS
6. FEELING BAD ABOUT YOURSELF - OR THAT YOU ARE A FAILURE OR HAVE LET YOURSELF OR YOUR FAMILY DOWN: NOT AL ALL
5. POOR APPETITE OR OVEREATING: SEVERAL DAYS
7. TROUBLE CONCENTRATING ON THINGS, SUCH AS READING THE NEWSPAPER OR WATCHING TELEVISION: SEVERAL DAYS
SUM OF ALL RESPONSES TO PHQ9 QUESTIONS 1 AND 2: 4
8. MOVING OR SPEAKING SO SLOWLY THAT OTHER PEOPLE COULD HAVE NOTICED. OR THE OPPOSITE, BEING SO FIGETY OR RESTLESS THAT YOU HAVE BEEN MOVING AROUND A LOT MORE THAN USUAL: NOT AT ALL

## 2021-08-04 ASSESSMENT — FIBROSIS 4 INDEX: FIB4 SCORE: 1.64

## 2021-08-04 NOTE — ED NOTES
Pharmacy Medication Reconciliation      ~Medication reconciliation updated and complete per pt at bedside with medication list. Reviewed list with pt and returned at bedside  ~Allergies have been verified and updated   ~No oral ABX within the last 30 days  ~Patient home pharmacy:[facility]

## 2021-08-04 NOTE — H&P
Hospital Medicine History & Physical Note    Date of Service  8/4/2021    Primary Care Physician  No primary care provider on file.    Consultants  Orthopedic surgery (Dr. Ríos)    Code Status  Full Code    Chief Complaint  Chief Complaint   Patient presents with   • T-5000 GLF   • Hip Injury       History of Presenting Illness  66-year-old male with a past medical history of hypothyroidism, CAD on aspirin and Plavix, COPD, and hypertension was transferred to emergency department from [facility] on 8/4/2021 where he presented after ground-level fall and was noted for left hip fracture.  Patient is ambulating his walker and his doctor informed him which caused him to fall.  He tripped over his feet and into his left hip.  No loss of consciousness or head trauma.     At the emergency department, stable vital signs and patient saturating well room air.  EDP discussed case with orthopedic surgery (Dr. Ríos), which patient is scheduled for surgical repair today.  Patient was admitted for left femur fracture requiring surgical repair.    Review of Systems  Review of Systems   Constitutional: Negative.    HENT: Negative.    Eyes: Negative.    Respiratory: Negative.    Cardiovascular: Negative.    Gastrointestinal: Negative.    Genitourinary: Negative.    Musculoskeletal: Negative.    Skin: Negative.    Neurological: Negative.    Endo/Heme/Allergies: Negative.    Psychiatric/Behavioral: Negative.        Past Medical History   has a past medical history of Asthma, Back pain, BPH (benign prostatic hyperplasia), Chronic obstructive pulmonary disease (HCC), Hypertension, and Hypothyroid.    Surgical History   has a past surgical history that includes cardiac cath, right/left heart.     Family History  family history is not on file.   Family history reviewed with patient. There is no family history that is pertinent to the chief complaint.     Social History   reports that he has quit smoking. He has never used smokeless  tobacco. He reports previous alcohol use. He reports previous drug use.    Allergies  Allergies   Allergen Reactions   • Bee Venom Anaphylaxis       Medications  Prior to Admission Medications   Prescriptions Last Dose Informant Patient Reported? Taking?   EPINEPHrine (EPIPEN 2-VEL) 0.3 MG/0.3ML Solution Auto-injector solution for injection PRN at PRN  Yes Yes   Sig: Inject 0.3 mg into the shoulder, thigh, or buttocks as needed.   acetaminophen/caffeine/butalbital 300-40-50 mg (FIORICET) -40 MG Cap capsule 8/3/2021 at PM  Yes Yes   Sig: Take 1 capsule by mouth every four hours as needed for Headache.   albuterol 108 (90 Base) MCG/ACT Aero Soln inhalation aerosol LAST WEEK at PRN  Yes Yes   Sig: Inhale 2 Puffs every 6 hours as needed for Shortness of Breath.   atorvastatin (LIPITOR) 40 MG Tab 8/3/2021 at PM  Yes Yes   Sig: Take 40 mg by mouth every evening.   cloNIDine (CATAPRES) 0.1 MG Tab 8/4/2021 at 0600  Yes Yes   Sig: Take 0.1 mg by mouth every morning.   cyclobenzaprine (FLEXERIL) 10 mg Tab 8/4/2021 at 0600  Yes Yes   Sig: Take 10 mg by mouth 3 times a day as needed for Moderate Pain.   famotidine (PEPCID) 40 MG Tab 8/3/2021 at PM  Yes Yes   Sig: Take 40 mg by mouth every evening.   gabapentin (NEURONTIN) 100 MG Cap 8/4/2021 at 0600  Yes Yes   Sig: Take 100 mg by mouth 4 times a day.   isosorbide mononitrate SR (IMDUR) 60 MG TABLET SR 24 HR 8/4/2021 at 0600  Yes Yes   Sig: Take 60 mg by mouth 2 times a day.   levothyroxine (SYNTHROID) 25 MCG Tab 8/4/2021 at 0600  Yes Yes   Sig: Take 25 mcg by mouth see administration instructions. Monday thru Friday only   levothyroxine (SYNTHROID) 50 MCG Tab 8/1/2021 at UNK  Yes Yes   Sig: Take 50 mcg by mouth two times a week. Saturday & Sunday Only   metoclopramide (REGLAN) 5 MG/5ML Solution 8/4/2021 at 0600  Yes Yes   Sig: Take 10 mg by mouth 4 times a day.   metoprolol tartrate (LOPRESSOR) 50 MG Tab 8/4/2021 at 0600  Yes Yes   Sig: Take 50 mg by mouth 2 times a day.    morphine (MS IR) 15 MG tablet 8/4/2021 at 0700  Yes Yes   Sig: Take 15 mg by mouth in the morning, at noon, and at bedtime.   nitroglycerin (NITROSTAT) 0.4 MG SL Tab 8/1/2021 at PRN  Yes Yes   Sig: Place 0.4 mg under the tongue every 5 minutes as needed for Chest Pain.   pantoprazole (PROTONIX) 40 MG Tablet Delayed Response 8/4/2021 at 0600  Yes Yes   Sig: Take 40 mg by mouth every morning.   potassium chloride SA (KDUR) 20 MEQ Tab CR 8/4/2021 at 0600  Yes Yes   Sig: Take 20 mEq by mouth every morning.   sertraline (ZOLOFT) 100 MG Tab 8/3/2021 at PM  Yes Yes   Sig: Take 100 mg by mouth every evening.   sertraline (ZOLOFT) 50 MG Tab 8/4/2021 at 0600  Yes Yes   Sig: Take 50 mg by mouth every morning.   tamsulosin (FLOMAX) 0.4 MG capsule 8/3/2021 at PM  Yes Yes   Sig: Take 0.4 mg by mouth every evening.      Facility-Administered Medications: None       Physical Exam  Temp:  [37.1 °C (98.8 °F)] 37.1 °C (98.8 °F)  Pulse:  [78-81] 78  Resp:  [16-20] 20  BP: (126-175)/(72-74) 126/74  SpO2:  [92 %-94 %] 94 %    Physical Exam  Constitutional:       General: He is not in acute distress.     Appearance: Normal appearance. He is not ill-appearing, toxic-appearing or diaphoretic.   HENT:      Head: Normocephalic and atraumatic.      Nose: Nose normal. No congestion.      Mouth/Throat:      Mouth: Mucous membranes are moist.   Eyes:      Extraocular Movements: Extraocular movements intact.      Pupils: Pupils are equal, round, and reactive to light.   Cardiovascular:      Rate and Rhythm: Normal rate and regular rhythm.      Pulses: Normal pulses.      Heart sounds: Normal heart sounds.   Pulmonary:      Effort: Pulmonary effort is normal.      Breath sounds: Normal breath sounds.   Abdominal:      General: Bowel sounds are normal. There is no distension.      Palpations: Abdomen is soft.      Tenderness: There is no abdominal tenderness. There is no guarding or rebound.   Musculoskeletal:         General: No swelling. Normal  range of motion.      Cervical back: Normal range of motion and neck supple.      Comments: Right hip tender to palpation  Limited passive/active ROM secondary to pain  Left leg showed and externally rotated   Skin:     General: Skin is warm.      Coloration: Skin is not jaundiced.   Neurological:      General: No focal deficit present.      Mental Status: He is alert and oriented to person, place, and time. Mental status is at baseline.      Cranial Nerves: No cranial nerve deficit.   Psychiatric:         Mood and Affect: Mood normal.         Behavior: Behavior normal.         Thought Content: Thought content normal.         Judgment: Judgment normal.         Laboratory:  Recent Labs     08/04/21  1642   WBC 20.3*   RBC 4.71   HEMOGLOBIN 13.1*   HEMATOCRIT 40.9*   MCV 86.8   MCH 27.8   MCHC 32.0*   RDW 44.3   PLATELETCT 195   MPV 11.1         No results for input(s): ALTSGPT, ASTSGOT, ALKPHOSPHAT, TBILIRUBIN, DBILIRUBIN, GAMMAGT, AMYLASE, LIPASE, ALB, PREALBUMIN, GLUCOSE in the last 72 hours.      No results for input(s): NTPROBNP in the last 72 hours.      No results for input(s): TROPONINT in the last 72 hours.    Imaging:  DX-WRIST-COMPLETE 3+ RIGHT   Final Result         No acute osseous abnormality.      OUTSIDE IMAGES-DX LOWER EXTREMITY, LEFT   Final Result          Assessment/Plan:  I anticipate this patient will require at least two midnights for appropriate medical management, necessitating inpatient admission.    * Closed left hip fracture (HCC)- (present on admission)  Assessment & Plan  Noted on outside facility x-ray  EDP discussed case with orthopedic surgery (Dr. Ríos), which patient is scheduled for OR tonight  Admit to surgical floor  Keep n.p.o.  Scheduled Tylenol  As needed morphine  Labs in a.m.     Leukocytosis- (present on admission)  Assessment & Plan  Reactive and due to acute trauma    GERD (gastroesophageal reflux disease)- (present on admission)  Assessment & Plan  Continue home  Pepcid and PPI    CAD (coronary artery disease) of artery bypass graft- (present on admission)  Assessment & Plan  CAD status post CABG and 5 stents as per patient  Status post pacemaker placement  Continue home metoprolol  Continue home Imdur  As needed nitroglycerin  Hold Plavix for now    Essential hypertension- (present on admission)  Assessment & Plan  Continue home clonidine and metoprolol  As needed antihypertensives    COPD (chronic obstructive pulmonary disease) ([facility])- (present on admission)  Assessment & Plan  Stable  Continue home inhalers    Hypothyroidism- (present on admission)  Assessment & Plan  Continue home levothyroxine      VTE prophylaxis: SCDs/TEDs

## 2021-08-04 NOTE — ED TRIAGE NOTES
Pt arrives as a transfer form [place] for complaints of left proximal femur fracture 2nd to a GLF today. He has intact CMS to leg. Received 1 of dilaudid, 50 mcg fentynal and 126 mg ketamin PTA

## 2021-08-04 NOTE — ED PROVIDER NOTES
ED Provider Note    CHIEF COMPLAINT  Chief Complaint   Patient presents with   • T-5000 GLF   • Hip Injury       HPI  Antony Akbar is a 66 y.o. male who presents with left hip fracture.  Patient sustained hip fracture today after mechanical ground-level fall.  He was seen in [place] at their clinic, found to have this hip fracture and transferred to our facility for further care.  Patient reports that he generally ambulates with a walker, his dog ran out in front of him so he let go of his walker and try to walk down a couple of stairs, tripped over his feet and fell onto his left hip.  He also reports some mild thumb pain on his right hand.  Patient denies any head strike, nausea or vomiting or neck or back pain.  Patient remembers the entire event, he denies any loss of consciousness.  He is on Plavix.    REVIEW OF SYSTEMS  ROS  See HPI for further details. All other systems are negative.     PAST MEDICAL HISTORY   has a past medical history of Asthma, Back pain, BPH (benign prostatic hyperplasia), Chronic obstructive pulmonary disease (HCC), Hypertension, and Hypothyroid.    SOCIAL HISTORY  Social History     Tobacco Use   • Smoking status: Former Smoker   • Smokeless tobacco: Never Used   Vaping Use   • Vaping Use: Never used   Substance and Sexual Activity   • Alcohol use: Not Currently   • Drug use: Not Currently   • Sexual activity: Not on file       SURGICAL HISTORY   has a past surgical history that includes cardiac cath, right/left heart.    CURRENT MEDICATIONS  Home Medications    **Home medications have not yet been reviewed for this encounter**         ALLERGIES  No Known Allergies    PHYSICAL EXAM  Vitals:    08/04/21 1529   BP: (!) 175/72   Pulse:    Resp:    Temp:    SpO2:        Physical Exam  Constitutional:       Appearance: He is well-developed.   Eyes:      Conjunctiva/sclera: Conjunctivae normal.   Pulmonary:      Effort: Pulmonary effort is normal.   Musculoskeletal:      Cervical back:  Normal range of motion and neck supple.      Comments: Pain on logroll of left hip.  Left lower extremity is mildly shortened.  Distal pulses are 2+.  Right first digit with some mild pain base of the first metacarpal.  No tenderness at the anatomical snuffbox.   Skin:     General: Skin is warm.   Neurological:      Mental Status: He is alert and oriented to person, place, and time.   Psychiatric:         Behavior: Behavior normal.           COURSE & MEDICAL DECISION MAKING  Pertinent Labs & Imaging studies reviewed. (See chart for details)    Patient with known left hip fracture.  Sent from outside hospital.  Patient will basic labs checked for preop.  We will also check EKG for preop.  I also asked.  Patient's risk given his associated tenderness here.  I believe scaphoid fracture is unlikely.  Patient x-rays reviewed, he does appear to have a very faint lucency at the femoral neck  I discussed the case with orthopedics who reviewed the images and likely take patient to the OR tonight.  Patient made n.p.o.     The patient will return for worsening symptoms and is stable at the time of discharge. The patient verbalizes understanding and will comply.    FINAL IMPRESSION  1.  Left hip fracture      Electronically signed by: Rodri Valdivia M.D., 8/4/2021 3:38 PM

## 2021-08-05 LAB
ALBUMIN SERPL BCP-MCNC: 3.4 G/DL (ref 3.2–4.9)
ALBUMIN/GLOB SERPL: 1.3 G/DL
ALP SERPL-CCNC: 112 U/L (ref 30–99)
ALT SERPL-CCNC: 12 U/L (ref 2–50)
ANION GAP SERPL CALC-SCNC: 10 MMOL/L (ref 7–16)
AST SERPL-CCNC: 19 U/L (ref 12–45)
BASOPHILS # BLD AUTO: 0.3 % (ref 0–1.8)
BASOPHILS # BLD: 0.04 K/UL (ref 0–0.12)
BILIRUB SERPL-MCNC: 0.4 MG/DL (ref 0.1–1.5)
BUN SERPL-MCNC: 10 MG/DL (ref 8–22)
CALCIUM SERPL-MCNC: 8.5 MG/DL (ref 8.5–10.5)
CHLORIDE SERPL-SCNC: 108 MMOL/L (ref 96–112)
CO2 SERPL-SCNC: 23 MMOL/L (ref 20–33)
CREAT SERPL-MCNC: 0.92 MG/DL (ref 0.5–1.4)
EOSINOPHIL # BLD AUTO: 0 K/UL (ref 0–0.51)
EOSINOPHIL NFR BLD: 0 % (ref 0–6.9)
ERYTHROCYTE [DISTWIDTH] IN BLOOD BY AUTOMATED COUNT: 43.9 FL (ref 35.9–50)
GLOBULIN SER CALC-MCNC: 2.7 G/DL (ref 1.9–3.5)
GLUCOSE SERPL-MCNC: 108 MG/DL (ref 65–99)
HCT VFR BLD AUTO: 36.1 % (ref 42–52)
HGB BLD-MCNC: 11.3 G/DL (ref 14–18)
IMM GRANULOCYTES # BLD AUTO: 0.08 K/UL (ref 0–0.11)
IMM GRANULOCYTES NFR BLD AUTO: 0.5 % (ref 0–0.9)
LYMPHOCYTES # BLD AUTO: 0.98 K/UL (ref 1–4.8)
LYMPHOCYTES NFR BLD: 6.6 % (ref 22–41)
MCH RBC QN AUTO: 27.4 PG (ref 27–33)
MCHC RBC AUTO-ENTMCNC: 31.3 G/DL (ref 33.7–35.3)
MCV RBC AUTO: 87.6 FL (ref 81.4–97.8)
MONOCYTES # BLD AUTO: 1.03 K/UL (ref 0–0.85)
MONOCYTES NFR BLD AUTO: 7 % (ref 0–13.4)
NEUTROPHILS # BLD AUTO: 12.67 K/UL (ref 1.82–7.42)
NEUTROPHILS NFR BLD: 85.6 % (ref 44–72)
NRBC # BLD AUTO: 0 K/UL
NRBC BLD-RTO: 0 /100 WBC
PLATELET # BLD AUTO: 146 K/UL (ref 164–446)
PMV BLD AUTO: 10.6 FL (ref 9–12.9)
POTASSIUM SERPL-SCNC: 4.5 MMOL/L (ref 3.6–5.5)
PROT SERPL-MCNC: 6.1 G/DL (ref 6–8.2)
RBC # BLD AUTO: 4.12 M/UL (ref 4.7–6.1)
SODIUM SERPL-SCNC: 141 MMOL/L (ref 135–145)
WBC # BLD AUTO: 14.8 K/UL (ref 4.8–10.8)

## 2021-08-05 PROCEDURE — 97162 PT EVAL MOD COMPLEX 30 MIN: CPT

## 2021-08-05 PROCEDURE — 770006 HCHG ROOM/CARE - MED/SURG/GYN SEMI*

## 2021-08-05 PROCEDURE — A9270 NON-COVERED ITEM OR SERVICE: HCPCS | Performed by: PHYSICIAN ASSISTANT

## 2021-08-05 PROCEDURE — 700105 HCHG RX REV CODE 258: Performed by: STUDENT IN AN ORGANIZED HEALTH CARE EDUCATION/TRAINING PROGRAM

## 2021-08-05 PROCEDURE — A9270 NON-COVERED ITEM OR SERVICE: HCPCS | Performed by: HOSPITALIST

## 2021-08-05 PROCEDURE — 36415 COLL VENOUS BLD VENIPUNCTURE: CPT

## 2021-08-05 PROCEDURE — 85025 COMPLETE CBC W/AUTO DIFF WBC: CPT

## 2021-08-05 PROCEDURE — A9270 NON-COVERED ITEM OR SERVICE: HCPCS | Performed by: STUDENT IN AN ORGANIZED HEALTH CARE EDUCATION/TRAINING PROGRAM

## 2021-08-05 PROCEDURE — 700102 HCHG RX REV CODE 250 W/ 637 OVERRIDE(OP): Performed by: PHYSICIAN ASSISTANT

## 2021-08-05 PROCEDURE — 80053 COMPREHEN METABOLIC PANEL: CPT

## 2021-08-05 PROCEDURE — 700102 HCHG RX REV CODE 250 W/ 637 OVERRIDE(OP): Performed by: STUDENT IN AN ORGANIZED HEALTH CARE EDUCATION/TRAINING PROGRAM

## 2021-08-05 PROCEDURE — 94664 DEMO&/EVAL PT USE INHALER: CPT

## 2021-08-05 PROCEDURE — 700102 HCHG RX REV CODE 250 W/ 637 OVERRIDE(OP): Performed by: HOSPITALIST

## 2021-08-05 PROCEDURE — 99233 SBSQ HOSP IP/OBS HIGH 50: CPT | Performed by: HOSPITALIST

## 2021-08-05 PROCEDURE — 700111 HCHG RX REV CODE 636 W/ 250 OVERRIDE (IP): Performed by: ORTHOPAEDIC SURGERY

## 2021-08-05 PROCEDURE — 97166 OT EVAL MOD COMPLEX 45 MIN: CPT

## 2021-08-05 RX ORDER — OXYCODONE HYDROCHLORIDE 10 MG/1
10 TABLET ORAL EVERY 6 HOURS PRN
Status: DISCONTINUED | OUTPATIENT
Start: 2021-08-05 | End: 2021-08-06 | Stop reason: HOSPADM

## 2021-08-05 RX ORDER — OXYCODONE HYDROCHLORIDE 10 MG/1
10 TABLET ORAL EVERY 6 HOURS PRN
Status: DISCONTINUED | OUTPATIENT
Start: 2021-08-05 | End: 2021-08-05

## 2021-08-05 RX ORDER — OXYCODONE HYDROCHLORIDE 5 MG/1
5 TABLET ORAL EVERY 6 HOURS PRN
Status: DISCONTINUED | OUTPATIENT
Start: 2021-08-05 | End: 2021-08-05

## 2021-08-05 RX ORDER — CLOPIDOGREL BISULFATE 75 MG/1
75 TABLET ORAL DAILY
Status: DISCONTINUED | OUTPATIENT
Start: 2021-08-05 | End: 2021-08-06 | Stop reason: HOSPADM

## 2021-08-05 RX ORDER — OXYCODONE HYDROCHLORIDE 5 MG/1
5 TABLET ORAL EVERY 6 HOURS PRN
Status: DISCONTINUED | OUTPATIENT
Start: 2021-08-05 | End: 2021-08-06 | Stop reason: HOSPADM

## 2021-08-05 RX ADMIN — TAMSULOSIN HYDROCHLORIDE 0.4 MG: 0.4 CAPSULE ORAL at 17:42

## 2021-08-05 RX ADMIN — MORPHINE SULFATE 2 MG: 4 INJECTION INTRAVENOUS at 05:30

## 2021-08-05 RX ADMIN — DOCUSATE SODIUM 50 MG AND SENNOSIDES 8.6 MG 2 TABLET: 8.6; 5 TABLET, FILM COATED ORAL at 17:41

## 2021-08-05 RX ADMIN — METOPROLOL TARTRATE 50 MG: 50 TABLET, FILM COATED ORAL at 17:43

## 2021-08-05 RX ADMIN — ACETAMINOPHEN 650 MG: 325 TABLET, FILM COATED ORAL at 14:50

## 2021-08-05 RX ADMIN — CLONIDINE HYDROCHLORIDE 0.1 MG: 0.1 TABLET ORAL at 09:03

## 2021-08-05 RX ADMIN — MORPHINE SULFATE 2 MG: 4 INJECTION INTRAVENOUS at 22:42

## 2021-08-05 RX ADMIN — ACETAMINOPHEN 650 MG: 325 TABLET, FILM COATED ORAL at 09:02

## 2021-08-05 RX ADMIN — DOCUSATE SODIUM 50 MG AND SENNOSIDES 8.6 MG 2 TABLET: 8.6; 5 TABLET, FILM COATED ORAL at 05:21

## 2021-08-05 RX ADMIN — OMEPRAZOLE 20 MG: 20 CAPSULE, DELAYED RELEASE ORAL at 05:21

## 2021-08-05 RX ADMIN — ISOSORBIDE MONONITRATE 60 MG: 60 TABLET, EXTENDED RELEASE ORAL at 17:44

## 2021-08-05 RX ADMIN — ATORVASTATIN CALCIUM 40 MG: 40 TABLET, FILM COATED ORAL at 20:32

## 2021-08-05 RX ADMIN — METOPROLOL TARTRATE 50 MG: 50 TABLET, FILM COATED ORAL at 05:20

## 2021-08-05 RX ADMIN — SODIUM CHLORIDE, POTASSIUM CHLORIDE, SODIUM LACTATE AND CALCIUM CHLORIDE: 600; 310; 30; 20 INJECTION, SOLUTION INTRAVENOUS at 05:20

## 2021-08-05 RX ADMIN — FAMOTIDINE 40 MG: 20 TABLET ORAL at 17:42

## 2021-08-05 RX ADMIN — ISOSORBIDE MONONITRATE 60 MG: 60 TABLET, EXTENDED RELEASE ORAL at 09:03

## 2021-08-05 RX ADMIN — OXYCODONE HYDROCHLORIDE 10 MG: 10 TABLET ORAL at 11:22

## 2021-08-05 RX ADMIN — ACETAMINOPHEN 650 MG: 325 TABLET, FILM COATED ORAL at 20:33

## 2021-08-05 RX ADMIN — MORPHINE SULFATE 2 MG: 4 INJECTION INTRAVENOUS at 14:58

## 2021-08-05 RX ADMIN — OXYCODONE HYDROCHLORIDE 10 MG: 10 TABLET ORAL at 17:47

## 2021-08-05 RX ADMIN — SERTRALINE HYDROCHLORIDE 50 MG: 50 TABLET ORAL at 05:21

## 2021-08-05 RX ADMIN — OXYCODONE HYDROCHLORIDE 10 MG: 10 TABLET ORAL at 20:40

## 2021-08-05 RX ADMIN — CLOPIDOGREL BISULFATE 75 MG: 75 TABLET ORAL at 13:16

## 2021-08-05 RX ADMIN — SERTRALINE HYDROCHLORIDE 100 MG: 100 TABLET ORAL at 17:41

## 2021-08-05 RX ADMIN — LEVOTHYROXINE SODIUM 25 MCG: 0.03 TABLET ORAL at 05:20

## 2021-08-05 ASSESSMENT — PAIN DESCRIPTION - PAIN TYPE
TYPE: SURGICAL PAIN
TYPE: SURGICAL PAIN
TYPE: ACUTE PAIN;SURGICAL PAIN
TYPE: SURGICAL PAIN
TYPE: ACUTE PAIN;SURGICAL PAIN
TYPE: ACUTE PAIN;SURGICAL PAIN
TYPE: SURGICAL PAIN

## 2021-08-05 ASSESSMENT — GAIT ASSESSMENTS
DISTANCE (FEET): 12
ASSISTIVE DEVICE: FRONT WHEEL WALKER
DEVIATION: ANTALGIC;SHUFFLED GAIT;STEP TO
GAIT LEVEL OF ASSIST: MAXIMAL ASSIST

## 2021-08-05 ASSESSMENT — COGNITIVE AND FUNCTIONAL STATUS - GENERAL
TOILETING: A LOT
DRESSING REGULAR UPPER BODY CLOTHING: A LITTLE
STANDING UP FROM CHAIR USING ARMS: A LOT
TURNING FROM BACK TO SIDE WHILE IN FLAT BAD: UNABLE
MOVING TO AND FROM BED TO CHAIR: UNABLE
SUGGESTED CMS G CODE MODIFIER MOBILITY: CM
WALKING IN HOSPITAL ROOM: A LOT
MOVING FROM LYING ON BACK TO SITTING ON SIDE OF FLAT BED: UNABLE
SUGGESTED CMS G CODE MODIFIER DAILY ACTIVITY: CK
DRESSING REGULAR LOWER BODY CLOTHING: A LOT
PERSONAL GROOMING: A LITTLE
HELP NEEDED FOR BATHING: A LOT
CLIMB 3 TO 5 STEPS WITH RAILING: TOTAL
DAILY ACTIVITIY SCORE: 16
MOBILITY SCORE: 8

## 2021-08-05 ASSESSMENT — ENCOUNTER SYMPTOMS
CHILLS: 0
ABDOMINAL PAIN: 0
FEVER: 0
PALPITATIONS: 0
VOMITING: 0
NAUSEA: 0
COUGH: 0
HEADACHES: 0
SHORTNESS OF BREATH: 0
DIZZINESS: 0

## 2021-08-05 ASSESSMENT — PAIN SCALES - GENERAL: PAIN_LEVEL: 6

## 2021-08-05 ASSESSMENT — ACTIVITIES OF DAILY LIVING (ADL): TOILETING: INDEPENDENT

## 2021-08-05 NOTE — ASSESSMENT & PLAN NOTE
CAD status post CABG and 5 stents as per patient  Status post pacemaker placement  Continue home metoprolol, plavix,  Imdur  As needed nitroglycerin

## 2021-08-05 NOTE — ANESTHESIA PREPROCEDURE EVALUATION
Angina q weekly per patient    Relevant Problems   PULMONARY   (positive) COPD (chronic obstructive pulmonary disease) (HCC)      CARDIAC   (positive) CAD (coronary artery disease) of artery bypass graft   (positive) Essential hypertension      GI   (positive) GERD (gastroesophageal reflux disease)      ENDO   (positive) Hypothyroidism       Physical Exam    Airway   Mallampati: II  TM distance: >3 FB  Neck ROM: full       Cardiovascular - normal exam  Rhythm: regular  Rate: normal  (-) murmur     Dental - normal exam           Pulmonary - normal exam  Breath sounds clear to auscultation     Abdominal    Neurological - normal exam                 Anesthesia Plan    ASA 3- EMERGENT   ASA physical status 3 criteria: CAD/stents (> 3 months)ASA physical status emergent criteria: compromised vital organ, limb or tissue and displaced fracture with possible neurovascular compromise    Plan - general       Airway plan will be ETT          Induction: intravenous    Postoperative Plan: Postoperative administration of opioids is intended.    Pertinent diagnostic labs and testing reviewed    Informed Consent:    Anesthetic plan and risks discussed with patient.    Use of blood products discussed with: patient whom consented to blood products.

## 2021-08-05 NOTE — THERAPY
Occupational Therapy   Initial Evaluation     Patient Name: Antony Akbar  Age:  66 y.o., Sex:  male  Medical Record #: [id number]  Today's Date: 8/5/2021     Precautions  Precautions: Fall Risk, Non Weight Bearing Left Lower Extremity  Comments: B knee buckling    Assessment  Patient is 66 y.o. male admitted after GLF s/p L femur IM nail. LOB x5 in standing w/ fww w/knees buckling req max A to correct. Reports wife can assist PRN, but she works 8hrs/day (4-11am) and pt is alone. Currently limited by decreased functional mobility, activity tolerance, balance, LLE numbness, and pain which are currently affecting pt's ability to complete ADLs/IADLs at baseline. Will continue to follow.     Plan    Recommend Occupational Therapy 4 times per week until therapy goals are met for the following treatments:  Adaptive Equipment, Neuro Re-Education / Balance, Self Care/Activities of Daily Living, Therapeutic Activities and Therapeutic Exercises.    DC Equipment Recommendations: Tub Transfer Bench, Unable to determine at this time  Discharge Recommendations: Recommend post-acute placement for additional occupational therapy services prior to discharge home. PM&R consult     Objective     08/05/21 1116   Total Time Spent   Total Time Spent (Mins) 27   Charge Group   OT Evaluation OT Evaluation Mod   Initial Contact Note    Initial Contact Note Order Received and Verified, Occupational Therapy Evaluation in Progress with Full Report to Follow.   Prior Living Situation   Prior Services Home-Independent   Housing / Facility [address] [facility]   Steps Into Home   (ramp)   Bathroom Set up Bathtub / Shower Combination;Shower Chair   Equipment Owned Front-Wheel Walker;4-Wheel Walker;Power Wheel Chair;Tub / Shower Seat   Lives with - Patient's Self Care Capacity Spouse   Comments Reports wife can assist PRN, but she works 8hrs/day. 4-11am   Prior Level of ADL Function   Self Feeding Independent   Grooming / Hygiene Requires Assist   Bathing  Requires Assist   Dressing Requires Assist   Toileting Independent   Prior Level of IADL Function   Medication Management Requires Assist   Laundry Requires Assist   Kitchen Mobility Requires Assist   Finances Requires Assist   Home Management Dependent   Shopping Dependent   Prior Level Of Mobility Independent With Device in Home;Uses Wheel Chair for Community Mobility   History of Falls   History of Falls Yes   Date of Last Fall   (reason for admit)   Precautions   Precautions Fall Risk;Non Weight Bearing Left Lower Extremity   Comments B knee buckling   Pain 0 - 10 Group   Location Hip   Location Orientation Left   Therapist Pain Assessment Post Activity;During Activity;Nurse Notified  (not quantified)   Cognition    Cognition / Consciousness WDL   Level of Consciousness Alert   Comments pleasant and cooperative; good insight into deficits   Passive ROM Upper Body   Passive ROM Upper Body WDL   Active ROM Upper Body   Active ROM Upper Body  WDL   Strength Upper Body   Upper Body Strength  WDL   Balance Assessment   Sitting Balance (Static) Fair +   Sitting Balance (Dynamic) Fair   Standing Balance (Static) Poor   Standing Balance (Dynamic) Poor   Weight Shift Sitting Fair   Weight Shift Standing Poor   Comments x5 LOBs in standing w/knees buckling req max A to correct   Bed Mobility    Supine to Sit Minimal Assist   Sit to Supine Moderate Assist   Scooting Minimal Assist   Comments HOB elevated   ADL Assessment   Grooming   (declined)   Lower Body Dressing Maximal Assist   Toileting Moderate Assist  (thomas)   Comments reports gets assist at baseline for LB ADLs and bathing   How much help from another person does the patient currently need...   Putting on and taking off regular lower body clothing? 2   Bathing (including washing, rinsing, and drying)? 2   Toileting, which includes using a toilet, bedpan, or urinal? 2   Putting on and taking off regular upper body clothing? 3   Taking care of personal grooming  such as brushing teeth? 3   Eating meals? 4   6 Clicks Daily Activity Score 16   Functional Mobility   Sit to Stand Moderate Assist   Bed, Chair, Wheelchair Transfer Moderate Assist   Toilet Transfers Moderate Assist  (would use BSC for BM)   Transfer Method Stand Step   Mobility w/ FWW; bed<>toilet   Comments reports LLE is numb and can only feel pressure   Edema / Skin Assessment   Edema / Skin  Not Assessed   Activity Tolerance   Comments limited by fatigue/pain and balance   Patient / Family Goals   Patient / Family Goal #1 to go home   Short Term Goals   Short Term Goal # 1 toilet txf with SPV   Short Term Goal # 2 toileting with SPV   Short Term Goal # 3 standing g/h with SPV   Education Group   Education Provided Role of Occupational Therapist;Transfers;Activities of Daily Living;Pathology of bedrest   Role of Occupational Therapist Patient Response Patient;Acceptance;Explanation;Verbal Demonstration;Reinforcement Needed   Transfers Patient Response Patient;Acceptance;Explanation;Reinforcement Needed;Demonstration;Verbal Demonstration   ADL Patient Response Patient;Acceptance;Explanation;Demonstration;Verbal Demonstration;Reinforcement Needed   Pathology of Bedrest Patient Response Patient;Acceptance;Explanation;Verbal Demonstration;Reinforcement Needed

## 2021-08-05 NOTE — CARE PLAN
The patient is Stable - Low risk of patient condition declining or worsening    Shift Goals  Clinical Goals: Pain management for ambulation  Patient Goals: To get leg to move    Progress made toward(s) clinical / shift goals:  Patient having some pain; meds given before transferring to this unit. Dressing in place; intact, old drainage noted. IV fluids. VSS. Will continue to monitor.    Patient is not progressing towards the following goals:      Problem: Pain - Standard  Goal: Alleviation of pain or a reduction in pain to the patient’s comfort goal  Outcome: Progressing     Problem: Knowledge Deficit - Standard  Goal: Patient and family/care givers will demonstrate understanding of plan of care, disease process/condition, diagnostic tests and medications  Outcome: Progressing     Problem: Fall Risk  Goal: Patient will remain free from falls  Outcome: Progressing

## 2021-08-05 NOTE — PROGRESS NOTES
1030: Spoke with MD Flores about patient possible discharge with home health or outpatient therapy services. Reached out to PT/OT for evaluations.

## 2021-08-05 NOTE — ANESTHESIA POSTPROCEDURE EVALUATION
Patient: Antony Akbar    Procedure Summary     Date: 08/04/21 Room / Location: [place] [address] / SURGERY [facility]    Anesthesia Start: 2106 Anesthesia Stop: 2154    Procedure: INSERTION, INTRAMEDULLARY MIKAEL, FEMUR, PROXIMAL (Left Hip) Diagnosis: (Left intertrochanteric femur fracture)    Surgeons: Valentin Ríos M.D. Responsible Provider: Juma May M.D.    Anesthesia Type: general ASA Status: 3 - Emergent          Final Anesthesia Type: general  Last vitals  BP   Blood Pressure: 125/70    Temp   36.5 °C (97.7 °F)    Pulse   75   Resp   13    SpO2   96 %      Anesthesia Post Evaluation    Patient location during evaluation: PACU  Patient participation: complete - patient participated  Level of consciousness: awake and alert  Pain score: 6    Airway patency: patent  Anesthetic complications: no  Cardiovascular status: hemodynamically stable  Respiratory status: acceptable  Hydration status: euvolemic    PONV: none          No complications documented.     Nurse Pain Score: 6 (NPRS)

## 2021-08-05 NOTE — ANESTHESIA PROCEDURE NOTES
Arterial Line  Performed by: Juma May M.D.  Authorized by: Juma May M.D.     Start Time:  8/4/2021 9:13 PM  End Time:  8/4/2021 9:15 PM  Localization: surface landmarks    Patient Location:  OR  Indication: continuous blood pressure monitoring        Catheter Size:  20 G  Seldinger Technique?: Yes    Laterality:  Left  Site:  Radial artery  Line Secured:  Antimicrobial disc, tape, transparent dressing and benzoin  Events: patient tolerated procedure well with no complications

## 2021-08-05 NOTE — DISCHARGE PLANNING
Anticipated Discharge Disposition: SNF    Action: LSW met with pt at bedside to obtain SNF choice. Pt requested referrals be sent to the followin) [facility]  2) [facility]  3) [facility]  4) [facility]    LSW faxed choice form to Zafar BELTRAN.    Barriers to Discharge: SNF acceptance.    Plan: Care coordination will follow up with SNF referrals.

## 2021-08-05 NOTE — OR NURSING
2153 Pt from OR, asleep, with O2 support of 6 L/min via mask, respirations regular and spontaneous, vital signs within normal limits. Pt post op site at left hip/leg, 2 sites, both dressings clean-dry and intact. SCDs ON, gurney set to lowest position and locked.    2215 Pt verbalized pain, medicated per MAR. Pt denies nausea, tolerated oral intake.    2225 Updated [place] () thru telephone call.    2255 Report given to Leeanne ARREDONDO.    2302 VSS, A&Ox4. Pt to room with transport and O2 support of 2 L/min via nasal cannula (O2 level > 3/4).

## 2021-08-05 NOTE — DISCHARGE PLANNING
Received Choice form at 3925  Agency/Facility Name: [facility], [facility], [facility]  Referral sent per Choice form @ [phone]

## 2021-08-05 NOTE — CARE PLAN
The patient is Stable - Low risk of patient condition declining or worsening    Shift Goals  Clinical Goals: Ambulate, pain management  Patient Goals: Ambulate, pain management    Progress made toward(s) clinical / shift goals:  The patient was seen by PT and OT to assess his mobility. The patient also discussed his plan of care with the MD who is collaborating with case management to find an appropriate facility for discharge/transfer.       Problem: Knowledge Deficit - Standard  Goal: Patient and family/care givers will demonstrate understanding of plan of care, disease process/condition, diagnostic tests and medications  Outcome: Progressing     Problem: Fall Risk  Goal: Patient will remain free from falls  Outcome: Progressing

## 2021-08-05 NOTE — HOSPITAL COURSE
66-year-old male with past medical history of CAD, hypothyroidism, COPD, hypertension presenting after ground-level fall which resulted in left intertrochanteric femur fracture.  Orthopedics was consulted and patient underwent intramedullary device placement.  Patient is currently pending PT/OT.

## 2021-08-05 NOTE — PROGRESS NOTES
Hospital Medicine Daily Progress Note    Date of Service  8/5/2021    Chief Complaint  Antony Akbar is a 66 y.o. male admitted 8/4/2021 with GLF and hip pain    Hospital Course  66-year-old male with past medical history of CAD, hypothyroidism, COPD, hypertension presenting after ground-level fall which resulted in left intertrochanteric femur fracture.  Orthopedics was consulted and patient underwent intramedullary device placement.  Patient is currently pending PT/OT.      Interval Problem Update  REMI overnight   Tolerated surgery well  His hip pain is tolerable  He is in an electric scooter at baseline and lives with his partner who can assist him if needed    I have personally seen and examined the patient at bedside. I discussed the plan of care with patient, bedside RN, charge RN and .    Consultants/Specialty  orthopedics    Code Status  Full Code    Disposition  Patient is medically cleared pending PT/OT.   Anticipate discharge to  vs SNF.  I have placed the appropriate orders for post-discharge needs.    Review of Systems  Review of Systems   Constitutional: Negative for chills and fever.   Respiratory: Negative for cough and shortness of breath.    Cardiovascular: Negative for chest pain and palpitations.   Gastrointestinal: Negative for abdominal pain, nausea and vomiting.   Genitourinary: Negative for dysuria and urgency.   Musculoskeletal: Positive for joint pain.   Neurological: Negative for dizziness and headaches.   All other systems reviewed and are negative.       Physical Exam  Temp:  [35.9 °C (96.6 °F)-37.6 °C (99.7 °F)] 37.6 °C (99.7 °F)  Pulse:  [64-82] 76  Resp:  [13-27] 18  BP: (109-175)/(66-91) 151/78  SpO2:  [92 %-100 %] 99 %    Physical Exam  Vitals and nursing note reviewed.   Constitutional:       Appearance: Normal appearance.   Cardiovascular:      Rate and Rhythm: Normal rate and regular rhythm.      Heart sounds: No murmur heard.     Pulmonary:      Effort: Pulmonary  effort is normal. No respiratory distress.      Breath sounds: Normal breath sounds.   Musculoskeletal:      Comments: Dressing over left hip   Neurological:      General: No focal deficit present.      Mental Status: He is alert and oriented to person, place, and time.         Fluids    Intake/Output Summary (Last 24 hours) at 8/5/2021 1242  Last data filed at 8/4/2021 2300  Gross per 24 hour   Intake 550 ml   Output 10 ml   Net 540 ml       Laboratory  Recent Labs     08/04/21  1642 08/05/21  0628   WBC 20.3* 14.8*   RBC 4.71 4.12*   HEMOGLOBIN 13.1* 11.3*   HEMATOCRIT 40.9* 36.1*   MCV 86.8 87.6   MCH 27.8 27.4   MCHC 32.0* 31.3*   RDW 44.3 43.9   PLATELETCT 195 146*   MPV 11.1 10.6     Recent Labs     08/04/21 1642 08/05/21  0628   SODIUM 143 141   POTASSIUM 4.3 4.5   CHLORIDE 110 108   CO2 20 23   GLUCOSE 110* 108*   BUN 8 10   CREATININE 0.97 0.92   CALCIUM 8.9 8.5                   Imaging  DX-PORTABLE FLUORO > 1 HOUR   Final Result      Portable fluoroscopy utilized for 30 seconds.         INTERPRETING LOCATION: [address] [place] [address], [id number]      DX-WRIST-COMPLETE 3+ RIGHT   Final Result         No acute osseous abnormality.      OUTSIDE IMAGES-DX LOWER EXTREMITY, LEFT   Final Result      DX-HIP-UNILATERAL-WITHOUT PELVIS-1 VIEW LEFT    (Results Pending)        Assessment/Plan  * Closed left hip fracture (HCC)- (present on admission)  Assessment & Plan  Noted on outside facility x-ray  S/p surgery   Pending PT/OT    Leukocytosis- (present on admission)  Assessment & Plan  Reactive and due to acute trauma    GERD (gastroesophageal reflux disease)- (present on admission)  Assessment & Plan  Continue home Pepcid and PPI    CAD (coronary artery disease) of artery bypass graft- (present on admission)  Assessment & Plan  CAD status post CABG and 5 stents as per patient  Status post pacemaker placement  Continue home metoprolol, plavix,  Imdur  As needed nitroglycerin    Essential hypertension- (present on  admission)  Assessment & Plan  Continue home clonidine and metoprolol  As needed antihypertensives    COPD (chronic obstructive pulmonary disease) (HCC)- (present on admission)  Assessment & Plan  Stable  Continue home inhalers    Hypothyroidism- (present on admission)  Assessment & Plan  Continue home levothyroxine       VTE prophylaxis: SCDs/TEDs    I have performed a physical exam and reviewed and updated ROS and Plan today (8/5/2021). In review of yesterday's note (8/4/2021), there are no changes except as documented above.

## 2021-08-05 NOTE — ANESTHESIA TIME REPORT
Anesthesia Start and Stop Event Times     Date Time Event    8/4/2021 2058 Ready for Procedure     2106 Anesthesia Start     2154 Anesthesia Stop        Responsible Staff  08/04/21    Name Role Begin End    Juma aMy M.D. Anesth 2106 2154        Preop Diagnosis (Free Text):  Pre-op Diagnosis     Left intertrochanteric femur fracture        Preop Diagnosis (Codes):    Post op Diagnosis  Displaced intertrochanteric fracture of left femur      Premium Reason  B. 1st Call    Comments:

## 2021-08-05 NOTE — OP REPORT
DATE OF OPERATION: 8/4/2021     PREOPERATIVE DIAGNOSIS: Left intertrochanteric femur fracture    POSTOPERATIVE DIAGNOSIS: Same    PROCEDURE PERFORMED: Surgical treatment of left intertrochanteric femur fracture with intramedullary device    SURGEON: Valentin Ríos M.D.     ASSISTANT: Ar Sahu PA-C    ANESTHESIOLOGIST:     ANESTHESIA: General    ESTIMATED BLOOD LOSS: 50 mL    INDICATIONS: The patient is a 66 y.o. male with a left intertrochanteric femur fracture resulting from a ground level fall.  The patient denies antecedent pain, and was found to have a normal neurovascular exam and skin envelope.  Radiographs reviewed by myself demonstrated the intertrochanteric femur fracture.  Given these findings, surgical treatment of the intertrochanteric fracture with an intramedullary device was indicated.  I discussed the risks and benefits of the procedure, including the risks of infection, wound healing complication, neurovascular injury, persistent hip pain, malunion, non-union, malrotation, and the medical risks of anesthesia including DVT, PE, MI, stroke, and death.  Benefits include early mobilization, improved chance of union, and reduction in the medical risks of hip fractures.  Alternatives to surgery were also discussed, including non-operative management.  The patient signed the informed consent and the operative extremity was marked.      PROCEDURE:  The patient underwent anesthesia, and was positioned supine on the fracture table and all bony prominences were well padded.  Preoperative antibiotics were administered. Sequential compression devices were employed.  Preoperative imaging was obtained, demonstrated reduction of the fracture using the table. The correct operative site was prepped and draped into a sterile field. A procedural pause was conducted to verify correct patient, correct extremity, presence of the surgeons initials on the operative extremity.    The guide pin for the [facility] hip nail  was placed into the tip of the greater trochanter and advanced under fluoroscopic guidance to the appropriate position. An incision was made around the pin, and the entry reamer was then used to gain access to the femoral canal.  The guide pin was then removed.    A long ball-tip guide wire was advanced down the femur to the knee, its position confirmed on fluoroscopy.  We then measured for, and selected a 127.5 x 11 x 180 OIC hip nail.  We then sequentially reamed to a size 12.5 mm reamer, and advanced the nail over the guide pin to an appropriate depth, confirmed by fluoroscopy.    The soft tissue sleeve for the lag screw was then advanced down to bone through a lateral thigh incision.  The guide pin was advanced to a central position within the femoral neck/head, confirmed by fluoroscopy.  We measured our lag screw, and reamed for our lag screw.  We then placed the lag screw by hand.  The fracture was compressed, and the set screw was locked proximally.  All instruments were removed from the proximal femur, and final fluoroscopic images obtained.    We then obtained perfect Quechan imaging distally, and placed one statically interlocked distal screw, obtaining good bicortical purchase.    All wounds were irrigated  with normal saline, and closed in layers, with 2-0 Vicryl in the subcutaneous tissue, and staples in the skin.  Sterile dressings were applied.       The patient tolerated the procedure well. There were no apparent complications. All sponge, needle, and instrument counts were correct on two separate occasions. He was awakened, extubated, and transferred to the recovery room in satisfactory condition.     The use of Bam Waldron as a surgical assistant was necessary for assistance with exposure, retraction, fracture reduction, instrumentation, and closure.    Post-Operative Plan:    1.  The patient should bear weight as tolerated on their operative extremity.  Gait aids (crutch or crutches, cane,  walker) may be used as needed, and may be discontinued when no longer required.  2.  IV antibiotics - may be continued for 24 hours, but are not required.  3.  DVT prophylaxis - SCD's and Lovenox 40 mg SQ daily while inpatient.  The patient may transition to Aspirin 325 mg PO BID as an outpatient  4.  Discharge planning  ____________________________________   Valentin Ríos M.D.   DD: 8/4/2021  9:51 PM

## 2021-08-05 NOTE — RESPIRATORY CARE
"COPD EDUCATION by COPD CLINICAL EDUCATOR  8/5/2021  at  3:16 PM by Adeola Churchill, RRT     Patient interviewed by COPD education team.  Patient unable to participate in full program.  A short intervention has been conducted.  A comprehensive packet including information about COPD, types of treatments to manage their disease and safe home Oxygen usage was provided and reviewed with patient at the bedside.     COPD Assessment  COPD Clinical Specialists ONLY  COPD Education Initiated: Yes--Short Intervention ([facility] connected Asthma/COPD overlap on O2 prn at home)  DME Company: [employer] (patient was unclear)  DME Equipment Type: Oxygen  Physician Name: Dr Romero in [place], [place]  Referrals Initiated: Yes (Lives in [place], [place] out of service area)  Pulmonary Rehab: No  Smoking Cessation: No  Hospice: N/A  Home Health Care: Yes (anticipated SNF/HH at discharge)  [facility] Outreach: N/A  [facility]: N/A  [facility]: Declined  Is this a COPD exacerbation patient?: No  $ Demo/Eval of SVN's, MDI's and Aerosols: Yes (spacer instruction)  (OP) Pulmonary Function Testing: Yes (another state)    Meds to Beds  Would the patient like to opt in for Bedside Medication Delivery at Discharge?: Yes, interested     MY COPD ACTION PLAN     It is recommended that patients and physicians /healthcare providers complete this action plan together. This plan should be discussed at each physician visit and updated as needed.    The green, yellow and red zones show groups of symptoms of COPD. This list of symptoms is not comprehensive, and you may experience other symptoms. In the \"Actions\" column, your healthcare provider has recommended actions for you to take based on your symptoms.    Patient Name: Antony Akbar   YOB: 1955   Last Updated on:     Green Zone:  I am doing well today Actions   •  Usual activitiy and exercise level •  Take daily medications   •  Usual amounts of cough and phlegm/mucus •  " "Use oxygen as prescribed   •  Sleep well at night •  Continue regular exercise/diet plan   •  Appetite is good •  At all times avoid cigarette smoke, inhaled irritants     Daily Medications (these medications are taken every day):                Yellow Zone:  I am having a bad day or a COPD flare Actions   •  More breathless than usual •  Continue daily medications   •  I have less energy for my daily activities •  Use quick relief inhaler as ordered   •  Increased or thicker phlegm/mucus •  Use oxygen as prescribed   •  Using quick relief inhaler/nebulizer more often •  Get plenty of rest   •  Swelling of ankles more than usual •  Use pursed lip breathing   •  More coughing than usual •  At all times avoid cigarette smoke, inhaled irritants   •  I feel like I have a \"chest cold\"   •  Poor sleep and my symptoms woke me up   •  My appetite is not good   •  My medicine is not helping    •  Call provider immediately if symptoms don’t improve     Continue daily medications, add rescue medications:   Albuterol 2 Puffs Every 6 hours PRN       Medications to be used during a flare up, (as Discussed with Provider):           Additional Information:  Use spacer provided  Use your Epi pen as directed    Red Zone:  I need urgent medical care Actions   •  Severe shortness of breath even at rest •  Call 911 or seek medical care immediately   •  Not able to do any activity because of breathing    •  Fever or shaking chills    •  Feeling confused or very drowsy     •  Chest pains    •  Coughing up blood              "

## 2021-08-05 NOTE — DISCHARGE PLANNING
Agency/Facility Name: [facility]  Spoke To: Meredith  Outcome: Pt does not meet x3 night stay, at this time the Pt would need one more night to plan for acceptance.     1616-   Agency/Facility Name: [facility]   Outcome: Facility marked Pt as accepted, DPA left vmail for admissions dept for follow up.

## 2021-08-05 NOTE — PROGRESS NOTES
Patient transferred from PACU. Pain controlled. No PO medications for pain ordered. Ar Sahu notified. Orders received. Will continue to monitor.

## 2021-08-05 NOTE — DIETARY
Nutrition Services: Day 1 of admit. Antony Akbar is a 66 y.o. male with admitting DX of closed right hip fracture.  Consult received for 2-13 lb weight loss for 1 month, decreased appetite.     Pt reports 12 lb weight loss over past month, Pt recalls weighing 206 lb in June 2021 and 191 lb on August 2nd, 2021. Pt reports eating 25% less than baseline PO intake for the past 2-3 months. Appetite is poor at this time. Pt reports eating fruit for lunch today. Pt was agreeable to Boost Plus BID in chocolate flavor. Pt encouraged to consume protein component of meal trays.     Assessment:  Ht: 171.5 cm, Wt 8/4: 85.5 kg (188 lb 7.9 oz) via bed scale, BMI: 29.09 - overweight  Diet/Intake: regular - Per chart pt PO % x 1 meal    Evaluation:   1. History of CAD, hypothyroidism, COPD, HTN  2. Pt appears well-nourished.   3. 6% weight loss over past months per pt report (severe)  4. Labs: glucose 108, alkaline phosphatase 112  5. Meds: pepcid, synthroid, prilosec, pericolace      Malnutrition Risk: Pt with moderate malnutrition in the context of chronic illness related to COPD as evidenced by severe weight loss and consuming 25% less than baseline PO intake for the past 2-3 months.     Recommendations/Plan:  1. Boost Plus BID per poor PO protocol.   2. Encourage intake of meals/supplements.   3. Document intake of all meals/supplements as % taken in ADL's to provide interdisciplinary communication across all shifts.   4. Monitor weight.  5. Nutrition rep will continue to see patient for ongoing meal and snack preferences.    RD following.

## 2021-08-05 NOTE — PROGRESS NOTES
4 Eyes Skin Assessment Completed by ARNULFO Fallon and ARNULFO Marti.    Head WDL  Ears WDL  Nose WDL  Mouth WDL  Neck WDL  Breast/Chest Scar  Shoulder Blades WDL  Spine WDL  (R) Arm/Elbow/Hand Redness, Blanching, Bruising and Abrasion  (L) Arm/Elbow/Hand Blanching, Bruising and Abrasion; skin tear to elbow  Abdomen WDL  Groin WDL  Scrotum/Coccyx/Buttocks Redness and Blanching  (R) Leg Abrasion  (L) Leg Abrasion  (R) Heel/Foot/Toe WDL  (L) Heel/Foot/Toe WDL          Devices In Places Pulse Ox, SCD's and Nasal Cannula      Interventions In Place Gray Ear Foams, Pillows and Pressure Redistribution Mattress    Possible Skin Injury No    Pictures Uploaded Into Epic N/A  Wound Consult Placed N/A  RN Wound Prevention Protocol Ordered Yes

## 2021-08-05 NOTE — PROGRESS NOTES
"   Orthopaedic PA Progress Note    Interval changes:did well overnight    ROS - Patient denies any new issues. No chest pain, dyspnea, or fever.  Pain well controlled.    /75   Pulse 69   Temp 35.9 °C (96.6 °F) (Temporal)   Resp 13   Ht 1.715 m (5' 7.5\")   Wt 85.5 kg (188 lb 7.9 oz)   SpO2 92%     Patient seen and examined  No acute distress  Breathing non labored  RRR  Surgical dressing is clean, dry, and intact. Patient clearly fires tibialis anterior, EHL, and gastrocnemius/soleus. Sensation is intact to light touch throughout superficial peroneal, deep peroneal, tibial, saphenous, and sural nerve distributions. Strong and palpable 2+ dorsalis pedis and posterior tibial pulses with capillary refill less than 2 seconds. No lower leg tenderness or discomfort.      Recent Labs     08/04/21  1642 08/05/21  0628   WBC 20.3* 14.8*   RBC 4.71 4.12*   HEMOGLOBIN 13.1* 11.3*   HEMATOCRIT 40.9* 36.1*   MCV 86.8 87.6   MCH 27.8 27.4   MCHC 32.0* 31.3*   RDW 44.3 43.9   PLATELETCT 195 146*   MPV 11.1 10.6       Active Hospital Problems    Diagnosis     Hypothyroidism [E03.9]     Closed left hip fracture ([facility]) [S72.002A]     COPD (chronic obstructive pulmonary disease) ([facility]) [J44.9]     Essential hypertension [I10]     CAD (coronary artery disease) of artery bypass graft [I25.810]     GERD (gastroesophageal reflux disease) [K21.9]     Leukocytosis [D72.829]        Assessment/Plan:  POD#1 S/P IMN L Hip  Wt bearing status - AT  PT/OT-initiated  Wound care:dressing change tomorrow  Drains - no  Naqvi-no  Sutures/Staples out- 10-14 days post operatively  Antibiotics: completed  DVT Prophylaxis- TEDS/SCDs/Foot pumps.   Lovenox: Start 40  Mg daily 12 hrs postop, Duration-until ambulatory > 150'  Future Procedures - none planned  Case Coordination for Discharge Planning - Disposition pending PT/OT clearance      "

## 2021-08-05 NOTE — ANESTHESIA PROCEDURE NOTES
Airway    Date/Time: 8/4/2021 9:15 PM  Performed by: Juma May M.D.  Authorized by: Juma May M.D.     Location:  OR  Urgency:  Elective  Difficult Airway: No    Indications for Airway Management:  Anesthesia      Spontaneous Ventilation: absent    Sedation Level:  Deep  Preoxygenated: Yes    Patient Position:  Sniffing  Final Airway Type:  Endotracheal airway  Final Endotracheal Airway:  ETT  Cuffed: Yes    Technique Used for Successful ETT Placement:  Direct laryngoscopy    Insertion Site:  Oral  Blade Type:  Gandhi  Laryngoscope Blade/Videolaryngoscope Blade Size:  2  ETT Size (mm):  8.0  Measured from:  Teeth  ETT to Teeth (cm):  24  Placement Verified by: auscultation and capnometry    Cormack-Lehane Classification:  Grade I - full view of glottis  Number of Attempts at Approach:  1  Ventilation Between Attempts:  None  Number of Other Approaches Attempted:  0

## 2021-08-05 NOTE — ANESTHESIA PROCEDURE NOTES
Peripheral Block    Date/Time: 8/4/2021 9:47 PM  Performed by: Juma May M.D.  Authorized by: Juma May M.D.     Start Time:  8/4/2021 9:47 PM  Reason for Block: at surgeon's request and post-op pain management ONLY    patient identified, IV checked, site marked, risks and benefits discussed, surgical consent, monitors and equipment checked, pre-op evaluation and timeout performed    Patient Position:  Supine  Prep: ChloraPrep    Monitoring:  Heart rate, continuous pulse ox and cardiac monitor  Block Region:  Lower Extremity  Lower Extremity - Block Type:  FEMORAL nerve block, Supra-Inguinal Fascia Iliaca approach    Laterality:  Left  Procedures: ultrasound guided  Image captured, interpreted and electronically stored.  Local Infiltration:  Lidocaine  Strength:  1 %  Dose:  3 ml  Block Type:  Single-shot  Needle Length:  100mm  Needle Gauge:  21 G  Needle Localization:  Ultrasound guidance  Injection Assessment:  Negative aspiration for heme, no paresthesia on injection, incremental injection and local visualized surrounding nerve on ultrasound  Evidence of intravascular injection: No     Suprainguinal Fascia Iliaca Block   With the patient supine, the US transducer was placed perpendicular to the ASIS of the operative side. The ASIS was identified at a point where the internal oblique, transversus abdominis and psoas major are stacked medial to the iliacus muscle. The plane in between was the fascia iliaca. A nerve block needle was advanced into the fascia iliaca and local anesthetic was injected deep/lateral to the fascia iliaca, just above the iliacus muscle.

## 2021-08-05 NOTE — DISCHARGE PLANNING
Care Transition Team Assessment    LSW met with pt at bedside to complete assessment. Pt A&Ox4 and able to verify the information on the face sheet. Pt reported that his lives with his partner, Virginia, in a single story house that has a ramp into it. Pt uses a walker in the house, has a power chair for when he leaves the house, and also has a shower chair. Pt reported that Virginia makes up his support system as she has become a CNA to assist with caring for him.    Pt denies any financial concerns at this time. Pt denies any SA or MH. Pt stated that he just established with a new PCP. LSW put PCP info on pt's face sheet.    Information Source  Orientation Level: Oriented X4  Information Given By: Patient  Informant's Name: Antony Akbar  Who is responsible for making decisions for patient? : Patient    Readmission Evaluation  Is this a readmission?: No    Elopement Risk  Legal Hold: No  Ambulatory or Self Mobile in Wheelchair: No-Not an Elopement Risk  Elopement Risk: Not at Risk for Elopement    Interdisciplinary Discharge Planning  Lives with - Patient's Self Care Capacity: Spouse  Patient or legal guardian wants to designate a caregiver: No  Housing / Facility: [address] [facility]  Prior Services: Home-Independent  Durable Medical Equipment: Walker, Other - Specify (powerchair, showerchair)    Discharge Preparedness  What is your plan after discharge?: Skilled nursing facility  What are your discharge supports?: Partner  Prior Functional Level: Ambulatory, Independent with Activities of Daily Living, Independent with Medication Management  Difficulity with ADLs: Walking  Difficulity with IADLs: None    Functional Assesment  Prior Functional Level: Ambulatory, Independent with Activities of Daily Living, Independent with Medication Management    Finances  Financial Barriers to Discharge: No  Prescription Coverage: Yes    Vision / Hearing Impairment  Vision Impairment : Yes  Right Eye Vision: Impaired, Wears  Glasses  Left Eye Vision: Impaired, Wears Glasses  Hearing Impairment : No    Advance Directive  Advance Directive?: None    Domestic Abuse  Have you ever been the victim of abuse or violence?: No  Physical Abuse or Sexual Abuse: No  Verbal Abuse or Emotional Abuse: No  Possible Abuse/Neglect Reported to:: Not Applicable    Psychological Assessment  History of Substance Abuse: None  History of Psychiatric Problems: No  Non-compliant with Treatment: No  Newly Diagnosed Illness: Yes    Discharge Risks or Barriers  Discharge risks or barriers?: No    Anticipated Discharge Information  Discharge Disposition: Still a Patient (30)

## 2021-08-05 NOTE — CONSULTS
8/4/2021    Time Called: 16:30  Time Arrived: 17:45    The patient was seen at the request of Dr Valdivia    HPI: Antony Akbar is a 66 y.o. male who presents with complaints of pain to left hip.  This started today after fll.  The pain is 5/10 and is described as sharp.  The pain is made worse by palpation of the area and made better by rest and immobilization.    Past Medical History:   Diagnosis Date   • Asthma    • Back pain    • BPH (benign prostatic hyperplasia)    • Chronic obstructive pulmonary disease (HCC)    • Hypertension    • Hypothyroid        Past Surgical History:   Procedure Laterality Date   • CARDIAC CATH, RIGHT/LEFT HEART         Medications  No current facility-administered medications on file prior to encounter.     Current Outpatient Medications on File Prior to Encounter   Medication Sig Dispense Refill   • gabapentin (NEURONTIN) 100 MG Cap Take 100 mg by mouth 4 times a day.     • morphine (MS IR) 15 MG tablet Take 15 mg by mouth in the morning, at noon, and at bedtime.     • cyclobenzaprine (FLEXERIL) 10 mg Tab Take 10 mg by mouth 3 times a day as needed for Moderate Pain.     • acetaminophen/caffeine/butalbital 300-40-50 mg (FIORICET) -40 MG Cap capsule Take 1 capsule by mouth every four hours as needed for Headache.     • metoclopramide (REGLAN) 5 MG/5ML Solution Take 10 mg by mouth 4 times a day.     • levothyroxine (SYNTHROID) 25 MCG Tab Take 25 mcg by mouth see administration instructions. Monday thru Friday only     • levothyroxine (SYNTHROID) 50 MCG Tab Take 50 mcg by mouth two times a week. Saturday & Sunday Only     • isosorbide mononitrate SR (IMDUR) 60 MG TABLET SR 24 HR Take 60 mg by mouth 2 times a day.     • cloNIDine (CATAPRES) 0.1 MG Tab Take 0.1 mg by mouth every morning.     • atorvastatin (LIPITOR) 40 MG Tab Take 40 mg by mouth every evening.     • famotidine (PEPCID) 40 MG Tab Take 40 mg by mouth every evening.     • tamsulosin (FLOMAX) 0.4 MG capsule Take 0.4 mg by  mouth every evening.     • sertraline (ZOLOFT) 100 MG Tab Take 100 mg by mouth every evening.     • sertraline (ZOLOFT) 50 MG Tab Take 50 mg by mouth every morning.     • pantoprazole (PROTONIX) 40 MG Tablet Delayed Response Take 40 mg by mouth every morning.     • potassium chloride SA (KDUR) 20 MEQ Tab CR Take 20 mEq by mouth every morning.     • metoprolol tartrate (LOPRESSOR) 50 MG Tab Take 50 mg by mouth 2 times a day.     • EPINEPHrine (EPIPEN 2-VEL) 0.3 MG/0.3ML Solution Auto-injector solution for injection Inject 0.3 mg into the shoulder, thigh, or buttocks as needed.     • albuterol 108 (90 Base) MCG/ACT Aero Soln inhalation aerosol Inhale 2 Puffs every 6 hours as needed for Shortness of Breath.     • nitroglycerin (NITROSTAT) 0.4 MG SL Tab Place 0.4 mg under the tongue every 5 minutes as needed for Chest Pain.     • clopidogrel (PLAVIX) 75 MG Tab Take 75 mg by mouth every morning.         Allergies  Bee venom    ROS  Left hip pain. All other systems were reviewed and found to be negative    History reviewed. No pertinent family history.    Social History     Socioeconomic History   • Marital status: Single     Spouse name: Not on file   • Number of children: Not on file   • Years of education: Not on file   • Highest education level: Not on file   Occupational History   • Not on file   Tobacco Use   • Smoking status: Former Smoker   • Smokeless tobacco: Never Used   Vaping Use   • Vaping Use: Never used   Substance and Sexual Activity   • Alcohol use: Not Currently   • Drug use: Not Currently   • Sexual activity: Not on file   Other Topics Concern   • Not on file   Social History Narrative   • Not on file     Social Determinants of Health     Financial Resource Strain:    • Difficulty of Paying Living Expenses:    Food Insecurity:    • Worried About Running Out of Food in the Last Year:    • Ran Out of Food in the Last Year:    Transportation Needs:    • Lack of Transportation (Medical):    • Lack of  "Transportation (Non-Medical):    Physical Activity:    • Days of Exercise per Week:    • Minutes of Exercise per Session:    Stress:    • Feeling of Stress :    Social Connections:    • Frequency of Communication with Friends and Family:    • Frequency of Social Gatherings with Friends and Family:    • Attends [employer] Services:    • Active Member of Clubs or Organizations:    • Attends Club or Organization Meetings:    • Marital Status:    Intimate Partner Violence:    • Fear of Current or Ex-Partner:    • Emotionally Abused:    • Physically Abused:    • Sexually Abused:        Physical Exam  Vitals  /67   Pulse 71   Temp 36.7 °C (98.1 °F) (Temporal)   Resp 20   Ht 1.715 m (5' 7.5\")   Wt 86.6 kg (191 lb)   SpO2 93%   General: Well Developed, Well Nourished, Age appropriate appearance  HEENT: Normocephalic, atraumatic  Psych: Normal mood and affect  Neck: Supple, nontender, no masses  Lungs: Breathing unlabored, No audible wheezing  Heart: Regular heart rate and rhythm  Abdomen: Soft, NT, ND  Neuro: Sensation grossly intact to BUE and BLE, moving all four extremities  Skin: Intact, no open wounds  Vascular: 2+DP/PT, Capillary refill <2 seconds  MSK: Left hip pain and deformity      Radiographs:  DX-WRIST-COMPLETE 3+ RIGHT   Final Result         No acute osseous abnormality.      OUTSIDE IMAGES-DX LOWER EXTREMITY, LEFT   Final Result      DX-PORTABLE FLUORO > 1 HOUR    (Results Pending)   DX-HIP-UNILATERAL-WITHOUT PELVIS-1 VIEW LEFT    (Results Pending)       Laboratory Values  Recent Labs     08/04/21  1642   WBC 20.3*   RBC 4.71   HEMOGLOBIN 13.1*   HEMATOCRIT 40.9*   MCV 86.8   MCH 27.8   MCHC 32.0*   RDW 44.3   PLATELETCT 195   MPV 11.1     Recent Labs     08/04/21  1642   SODIUM 143   POTASSIUM 4.3   CHLORIDE 110   CO2 20   GLUCOSE 110*   BUN 8             Impression: Left intertrochanteric femur fracture    Plan:We discussed the diagnosis and findings with the patient at length.  We reviewed " possible non operative and operative interventions and the risks and benefits of each of these.  he had a chance to ask questions and all of these were answered to his satisfaction. The patient chose to proceed with  operative intervention. Risks and benefits of surgery were discussed which include but are not limited to bleeding, infection, neurovascular damage, malunion, nonunion, instability, limb length discrepancy, DVT, PE, MI, Stroke and death. They understand these risks and wish to proceed.

## 2021-08-05 NOTE — PROGRESS NOTES
Mobility Progress Note    Surgery patient: Yes  Date of surgery: 8/4/21  Ambulated 50 ft on day of surgery? (N/A if patient did not undergo surgery today): NA  Number of times ambulated 50 feet or greater today: 0, ambulated 24ft with PT  Patient has been up to chair, edge of bed or HOB 90 degrees for all meals?: Yes  Goal met? (goal is ambulating at least 50 feet 2 times on day shift, one time on night shift): No  If patient did not meet mobility goal, why?: The patient uses a wheelchair baseline and requires maximum assistance to ambulate

## 2021-08-05 NOTE — THERAPY
Physical Therapy   Initial Evaluation     Patient Name: Antony Akbar  Age:  66 y.o., Sex:  male  Medical Record #: [id number]  Today's Date: 8/5/2021     Precautions: (P) Fall Risk, Weight Bearing As Tolerated Left Lower Extremity    Assessment  Patient is 66 y.o. male s/p GLF with L femur fx and subsequent IMN. Pt WBAT LLE. Lives in [facility] with ramp to enter and wife who assists with ADLs and mobility but works during day 8 hours. Pt PLOF: 4ww for indoor household gait, but outside home power w/c for mobility. Patient presents with severe mobility impairments from prior injuries/surgeries as well as current injury and requires significant assistance for all. Pt demos bed mobility MODA, transfers MODA with FWW, gait 12 feet x 2 with MAX assist (5 LOBs with legs buckling and MAX assist to recover, short stride antalgic pattern). Seated rest break on toilet. Unsafe to ambulate further. High fall risk. Pt at risk for falls, further injury and functional decline and would benefit from skilled PT to address impairments to progress towards optimal functional level. Will follow patient while admitted and progress POC as tolerated. Would benefit from inpatient rehab. Unsafe to go home at this time and unlikely he will be at safe functional level to return home in next few visits.       Plan    Recommend Physical Therapy 4 times per week until therapy goals are met for the following treatments:  Bed Mobility, Community Re-integration, Gait Training, Neuro Re-Education / Balance, Stair Training, Therapeutic Activities and Therapeutic Exercises    DC Equipment Recommendations:  Unable to determine at this time  Discharge Recommendations:  Recommend post-acute placement for additional physical therapy services prior to discharge home (Inpatient rehab)       Subjective  Pt pleasant and agreeable to PT session reporting high levels of LLE pain with mobility.      Objective  Pt demos bed mobility MODA, transfers MODA with FWW, gait 12  feet x 2 with MAX assist (5 LOBs with legs buckling and MAX assist to recover, short stride antalgic pattern). Seated rest break on toilet. Unsafe to ambulate further. High fall risk.      08/05/21 1142   Total Time Spent   Total Time Spent (Mins) 25   Charge Group   PT Evaluation PT Evaluation Mod   Initial Contact Note    Initial Contact Note Order Received and Verified, Physical Therapy Evaluation in Progress with Full Report to Follow.   Precautions   Precautions Fall Risk;Weight Bearing As Tolerated Left Lower Extremity   Comments Multiple LE buckling, LOB, MAX for gait   Pain 0 - 10 Group   Therapist Pain Assessment Post Activity Pain Same as Prior to Activity  (high level of pain with mobility, buckling legs)   Non Verbal Descriptors   Non Verbal Scale  Calm   Prior Living Situation   Prior Services Home-Independent   Housing / Facility 1 Story House   Steps Into Home   (ramp to enter)   Equipment Owned Power Wheel Chair;4-Wheel Walker;Front-Wheel Walker   Lives with - Patient's Self Care Capacity Spouse   Comments Spouse works during day but assists with ADLs and groceries, transportation   Prior Level of Functional Mobility   Bed Mobility Independent   Transfer Status Independent   Ambulation Independent   Distance Ambulation (Feet)   (household)   Assistive Devices Used 4-Wheel Walker   Wheelchair Independent   Stairs Required Assist   Comments 4ww indoors, power w/c out of house   History of Falls   History of Falls Yes   Date of Last Fall   (reason for admission)   Cognition    Cognition / Consciousness WDL   Level of Consciousness Alert   Comments Pleasant and agreeable   Active ROM Lower Body    Comments RLE impaired prior knee surgeries, LLE pain limting   Strength Lower Body   Comments RLE impaired prior knee surgeries, LLE impaired d/t pain   Sensation Lower Body   Comments RLE WFL, LLE numbness    Lower Body Muscle Tone   Lower Body Muscle Tone  WDL   Coordination Lower Body    Comments Impaired  d/t pain   Balance Assessment   Sitting Balance (Static) Fair   Sitting Balance (Dynamic) Fair -   Standing Balance (Static) Poor   Standing Balance (Dynamic) Poor -   Weight Shift Sitting Fair   Weight Shift Standing Poor   Comments FWW and support  in standing   Gait Analysis   Gait Level Of Assist Maximal Assist  (5 separate LOBs MAX to recover with LE buckling. CECIL otherw)   Assistive Device Front Wheel Walker   Distance (Feet) 12   # of Times Distance was Traveled 2   Deviation Antalgic;Shuffled Gait;Step To  (Multiple LOBs MAX to recover. Otherwise CECIL)   # of Stairs Climbed 0   Weight Bearing Status WBAT LLE   Comments Multiple LOBs MAX to recover, otherwise CECIL for gait   Bed Mobility    Supine to Sit Minimal Assist   Sit to Supine Moderate Assist   Scooting Minimal Assist   Rolling Minimum Assist to Lt.   Comments LE support   Functional Mobility   Sit to Stand Moderate Assist   Bed, Chair, Wheelchair Transfer Moderate Assist   Toilet Transfers Moderate Assist   Transfer Method Stand Step   Mobility bed to bathroom toilet, back to bed   How much difficulty does the patient currently have...   Turning over in bed (including adjusting bedclothes, sheets and blankets)? 1   Sitting down on and standing up from a chair with arms (e.g., wheelchair, bedside commode, etc.) 1   Moving from lying on back to sitting on the side of the bed? 1   How much help from another person does the patient currently need...   Moving to and from a bed to a chair (including a wheelchair)? 2   Need to walk in a hospital room? 2   Climbing 3-5 steps with a railing? 1   6 clicks Mobility Score 8   Activity Tolerance   Sitting in Chair toilet <5   Sitting Edge of Bed 5   Standing >10   Comments fatigued, pain   Patient / Family Goals    Patient / Family Goal #1 none stated   Short Term Goals    Short Term Goal # 1 Pt to demo bed mobility flat HOB with SPV in 6 visits to return home safely   Short Term Goal # 2 Pt to demo level  surface transfers with FWW and SPV in 6 visits to return home safely   Short Term Goal # 3 Pt to ambulate 50 feet with FWW and SPV in 6 visits to return home safely   Education Group   Education Provided Role of Physical Therapist;Weight Bearing Status   Role of Physical Therapist Patient Response Patient;Acceptance;Explanation;Verbal Demonstration   Weight Bearing Status Patient Response Patient;Acceptance;Explanation;Verbal Demonstration   Problem List    Problems Pain;Impaired Bed Mobility;Impaired Transfers;Impaired Ambulation;Functional Strength Deficit;Functional ROM Deficit;Impaired Balance;Impaired Coordination;Decreased Activity Tolerance   Anticipated Discharge Equipment and Recommendations   DC Equipment Recommendations Unable to determine at this time   Discharge Recommendations Recommend post-acute placement for additional physical therapy services prior to discharge home  (Inpatient rehab)   Interdisciplinary Plan of Care Collaboration   IDT Collaboration with  Nursing   Patient Position at End of Therapy In Bed;Call Light within Reach;Tray Table within Reach;Phone within Reach   Collaboration Comments RN updated   Session Information   Date / Session Number  8/5-1(1/4, 8/11)

## 2021-08-06 ENCOUNTER — HOSPITAL ENCOUNTER (INPATIENT)
Facility: REHABILITATION | Age: 66
LOS: 9 days | DRG: 560 | End: 2021-08-15
Attending: PHYSICAL MEDICINE & REHABILITATION | Admitting: PHYSICAL MEDICINE & REHABILITATION
Payer: MEDICARE

## 2021-08-06 ENCOUNTER — PATIENT OUTREACH (OUTPATIENT)
Dept: HEALTH INFORMATION MANAGEMENT | Facility: OTHER | Age: 66
End: 2021-08-06

## 2021-08-06 VITALS
TEMPERATURE: 97.8 F | DIASTOLIC BLOOD PRESSURE: 84 MMHG | WEIGHT: 188.49 LBS | RESPIRATION RATE: 16 BRPM | HEIGHT: 68 IN | BODY MASS INDEX: 28.57 KG/M2 | SYSTOLIC BLOOD PRESSURE: 159 MMHG | OXYGEN SATURATION: 95 % | HEART RATE: 79 BPM

## 2021-08-06 DIAGNOSIS — S72.002A CLOSED FRACTURE OF LEFT HIP, INITIAL ENCOUNTER (HCC): ICD-10-CM

## 2021-08-06 LAB
ERYTHROCYTE [DISTWIDTH] IN BLOOD BY AUTOMATED COUNT: 45.7 FL (ref 35.9–50)
HCT VFR BLD AUTO: 37.4 % (ref 42–52)
HGB BLD-MCNC: 11.2 G/DL (ref 14–18)
MCH RBC QN AUTO: 27.1 PG (ref 27–33)
MCHC RBC AUTO-ENTMCNC: 29.9 G/DL (ref 33.7–35.3)
MCV RBC AUTO: 90.6 FL (ref 81.4–97.8)
MORPHOLOGY BLD-IMP: NORMAL
PLATELET # BLD AUTO: 154 K/UL (ref 164–446)
PMV BLD AUTO: 11.1 FL (ref 9–12.9)
RBC # BLD AUTO: 4.13 M/UL (ref 4.7–6.1)
WBC # BLD AUTO: 14.2 K/UL (ref 4.8–10.8)

## 2021-08-06 PROCEDURE — 700111 HCHG RX REV CODE 636 W/ 250 OVERRIDE (IP): Performed by: ORTHOPAEDIC SURGERY

## 2021-08-06 PROCEDURE — 700102 HCHG RX REV CODE 250 W/ 637 OVERRIDE(OP): Performed by: PHYSICAL MEDICINE & REHABILITATION

## 2021-08-06 PROCEDURE — 85027 COMPLETE CBC AUTOMATED: CPT

## 2021-08-06 PROCEDURE — 99239 HOSP IP/OBS DSCHRG MGMT >30: CPT | Performed by: HOSPITALIST

## 2021-08-06 PROCEDURE — 94760 N-INVAS EAR/PLS OXIMETRY 1: CPT

## 2021-08-06 PROCEDURE — 700102 HCHG RX REV CODE 250 W/ 637 OVERRIDE(OP): Performed by: HOSPITALIST

## 2021-08-06 PROCEDURE — U0005 INFEC AGEN DETEC AMPLI PROBE: HCPCS

## 2021-08-06 PROCEDURE — 700102 HCHG RX REV CODE 250 W/ 637 OVERRIDE(OP): Performed by: PHYSICIAN ASSISTANT

## 2021-08-06 PROCEDURE — A9270 NON-COVERED ITEM OR SERVICE: HCPCS | Performed by: STUDENT IN AN ORGANIZED HEALTH CARE EDUCATION/TRAINING PROGRAM

## 2021-08-06 PROCEDURE — 36415 COLL VENOUS BLD VENIPUNCTURE: CPT

## 2021-08-06 PROCEDURE — A9270 NON-COVERED ITEM OR SERVICE: HCPCS | Performed by: HOSPITALIST

## 2021-08-06 PROCEDURE — 770010 HCHG ROOM/CARE - REHAB SEMI PRIVAT*

## 2021-08-06 PROCEDURE — U0003 INFECTIOUS AGENT DETECTION BY NUCLEIC ACID (DNA OR RNA); SEVERE ACUTE RESPIRATORY SYNDROME CORONAVIRUS 2 (SARS-COV-2) (CORONAVIRUS DISEASE [COVID-19]), AMPLIFIED PROBE TECHNIQUE, MAKING USE OF HIGH THROUGHPUT TECHNOLOGIES AS DESCRIBED BY CMS-2020-01-R: HCPCS

## 2021-08-06 PROCEDURE — 99223 1ST HOSP IP/OBS HIGH 75: CPT | Mod: AI | Performed by: PHYSICAL MEDICINE & REHABILITATION

## 2021-08-06 PROCEDURE — 700111 HCHG RX REV CODE 636 W/ 250 OVERRIDE (IP): Performed by: HOSPITALIST

## 2021-08-06 PROCEDURE — A9270 NON-COVERED ITEM OR SERVICE: HCPCS | Performed by: PHYSICIAN ASSISTANT

## 2021-08-06 PROCEDURE — 700102 HCHG RX REV CODE 250 W/ 637 OVERRIDE(OP): Performed by: STUDENT IN AN ORGANIZED HEALTH CARE EDUCATION/TRAINING PROGRAM

## 2021-08-06 PROCEDURE — A9270 NON-COVERED ITEM OR SERVICE: HCPCS | Performed by: PHYSICAL MEDICINE & REHABILITATION

## 2021-08-06 RX ORDER — TAMSULOSIN HYDROCHLORIDE 0.4 MG/1
0.4 CAPSULE ORAL EVERY EVENING
Status: CANCELLED | OUTPATIENT
Start: 2021-08-06

## 2021-08-06 RX ORDER — ECHINACEA PURPUREA EXTRACT 125 MG
2 TABLET ORAL PRN
Status: DISCONTINUED | OUTPATIENT
Start: 2021-08-06 | End: 2021-08-15 | Stop reason: HOSPADM

## 2021-08-06 RX ORDER — TRAZODONE HYDROCHLORIDE 50 MG/1
50 TABLET ORAL
Status: DISCONTINUED | OUTPATIENT
Start: 2021-08-06 | End: 2021-08-15 | Stop reason: HOSPADM

## 2021-08-06 RX ORDER — CLOPIDOGREL BISULFATE 75 MG/1
75 TABLET ORAL DAILY
Status: CANCELLED | OUTPATIENT
Start: 2021-08-07

## 2021-08-06 RX ORDER — OXYCODONE HYDROCHLORIDE 10 MG/1
10 TABLET ORAL
Status: DISCONTINUED | OUTPATIENT
Start: 2021-08-06 | End: 2021-08-15 | Stop reason: HOSPADM

## 2021-08-06 RX ORDER — SERTRALINE HYDROCHLORIDE 100 MG/1
100 TABLET, FILM COATED ORAL EVERY EVENING
Status: CANCELLED | OUTPATIENT
Start: 2021-08-06

## 2021-08-06 RX ORDER — ATORVASTATIN CALCIUM 40 MG/1
40 TABLET, FILM COATED ORAL NIGHTLY
Status: DISCONTINUED | OUTPATIENT
Start: 2021-08-06 | End: 2021-08-15 | Stop reason: HOSPADM

## 2021-08-06 RX ORDER — OXYCODONE HYDROCHLORIDE 5 MG/1
5 TABLET ORAL
Status: DISCONTINUED | OUTPATIENT
Start: 2021-08-06 | End: 2021-08-15 | Stop reason: HOSPADM

## 2021-08-06 RX ORDER — BUTALBITAL, ACETAMINOPHEN AND CAFFEINE 50; 325; 40 MG/1; MG/1; MG/1
1 TABLET ORAL EVERY 4 HOURS PRN
Status: CANCELLED | OUTPATIENT
Start: 2021-08-06

## 2021-08-06 RX ORDER — ONDANSETRON 2 MG/ML
4 INJECTION INTRAMUSCULAR; INTRAVENOUS 4 TIMES DAILY PRN
Status: DISCONTINUED | OUTPATIENT
Start: 2021-08-06 | End: 2021-08-15 | Stop reason: HOSPADM

## 2021-08-06 RX ORDER — ATORVASTATIN CALCIUM 40 MG/1
40 TABLET, FILM COATED ORAL NIGHTLY
Status: CANCELLED | OUTPATIENT
Start: 2021-08-06

## 2021-08-06 RX ORDER — FAMOTIDINE 20 MG/1
40 TABLET, FILM COATED ORAL EVERY EVENING
Status: DISCONTINUED | OUTPATIENT
Start: 2021-08-06 | End: 2021-08-15 | Stop reason: HOSPADM

## 2021-08-06 RX ORDER — OMEPRAZOLE 20 MG/1
20 CAPSULE, DELAYED RELEASE ORAL EVERY MORNING
Status: CANCELLED | OUTPATIENT
Start: 2021-08-07

## 2021-08-06 RX ORDER — LEVOTHYROXINE SODIUM 0.03 MG/1
25 TABLET ORAL
Status: DISCONTINUED | OUTPATIENT
Start: 2021-08-09 | End: 2021-08-15 | Stop reason: HOSPADM

## 2021-08-06 RX ORDER — LEVOTHYROXINE SODIUM 0.03 MG/1
25 TABLET ORAL
Status: CANCELLED | OUTPATIENT
Start: 2021-08-09

## 2021-08-06 RX ORDER — ISOSORBIDE MONONITRATE 30 MG/1
60 TABLET, EXTENDED RELEASE ORAL 2 TIMES DAILY
Status: DISCONTINUED | OUTPATIENT
Start: 2021-08-06 | End: 2021-08-15 | Stop reason: HOSPADM

## 2021-08-06 RX ORDER — OMEPRAZOLE 20 MG/1
20 CAPSULE, DELAYED RELEASE ORAL EVERY MORNING
Status: DISCONTINUED | OUTPATIENT
Start: 2021-08-07 | End: 2021-08-15 | Stop reason: HOSPADM

## 2021-08-06 RX ORDER — BISACODYL 10 MG
10 SUPPOSITORY, RECTAL RECTAL
Status: DISCONTINUED | OUTPATIENT
Start: 2021-08-06 | End: 2021-08-15 | Stop reason: HOSPADM

## 2021-08-06 RX ORDER — ONDANSETRON 4 MG/1
4 TABLET, ORALLY DISINTEGRATING ORAL 4 TIMES DAILY PRN
Status: DISCONTINUED | OUTPATIENT
Start: 2021-08-06 | End: 2021-08-15 | Stop reason: HOSPADM

## 2021-08-06 RX ORDER — POLYETHYLENE GLYCOL 3350 17 G/17G
1 POWDER, FOR SOLUTION ORAL
Status: DISCONTINUED | OUTPATIENT
Start: 2021-08-06 | End: 2021-08-15 | Stop reason: HOSPADM

## 2021-08-06 RX ORDER — MORPHINE SULFATE 15 MG/1
15 TABLET ORAL EVERY 6 HOURS
Status: DISCONTINUED | OUTPATIENT
Start: 2021-08-06 | End: 2021-08-15 | Stop reason: HOSPADM

## 2021-08-06 RX ORDER — FAMOTIDINE 20 MG/1
40 TABLET, FILM COATED ORAL EVERY EVENING
Status: CANCELLED | OUTPATIENT
Start: 2021-08-06

## 2021-08-06 RX ORDER — TAMSULOSIN HYDROCHLORIDE 0.4 MG/1
0.4 CAPSULE ORAL EVERY EVENING
Status: DISCONTINUED | OUTPATIENT
Start: 2021-08-06 | End: 2021-08-15 | Stop reason: HOSPADM

## 2021-08-06 RX ORDER — HYDROMORPHONE HYDROCHLORIDE 2 MG/ML
0.5 INJECTION, SOLUTION INTRAMUSCULAR; INTRAVENOUS; SUBCUTANEOUS
Status: DISCONTINUED | OUTPATIENT
Start: 2021-08-06 | End: 2021-08-06

## 2021-08-06 RX ORDER — HYDRALAZINE HYDROCHLORIDE 25 MG/1
25 TABLET, FILM COATED ORAL EVERY 8 HOURS PRN
Status: DISCONTINUED | OUTPATIENT
Start: 2021-08-06 | End: 2021-08-15 | Stop reason: HOSPADM

## 2021-08-06 RX ORDER — LEVOTHYROXINE SODIUM 0.05 MG/1
50 TABLET ORAL
Status: CANCELLED | OUTPATIENT
Start: 2021-08-07

## 2021-08-06 RX ORDER — POLYVINYL ALCOHOL 14 MG/ML
1 SOLUTION/ DROPS OPHTHALMIC PRN
Status: DISCONTINUED | OUTPATIENT
Start: 2021-08-06 | End: 2021-08-15 | Stop reason: HOSPADM

## 2021-08-06 RX ORDER — LEVOTHYROXINE SODIUM 0.05 MG/1
50 TABLET ORAL
Status: DISCONTINUED | OUTPATIENT
Start: 2021-08-07 | End: 2021-08-15 | Stop reason: HOSPADM

## 2021-08-06 RX ORDER — ACETAMINOPHEN 325 MG/1
650 TABLET ORAL 3 TIMES DAILY
Status: CANCELLED | OUTPATIENT
Start: 2021-08-06

## 2021-08-06 RX ORDER — BUTALBITAL, ACETAMINOPHEN AND CAFFEINE 50; 325; 40 MG/1; MG/1; MG/1
1 TABLET ORAL EVERY 4 HOURS PRN
Status: DISCONTINUED | OUTPATIENT
Start: 2021-08-06 | End: 2021-08-15 | Stop reason: HOSPADM

## 2021-08-06 RX ORDER — MORPHINE SULFATE 15 MG/1
15 TABLET ORAL EVERY 6 HOURS PRN
Status: DISCONTINUED | OUTPATIENT
Start: 2021-08-06 | End: 2021-08-06 | Stop reason: HOSPADM

## 2021-08-06 RX ORDER — METOPROLOL TARTRATE 50 MG/1
50 TABLET, FILM COATED ORAL 2 TIMES DAILY
Status: CANCELLED | OUTPATIENT
Start: 2021-08-06

## 2021-08-06 RX ORDER — CLOPIDOGREL BISULFATE 75 MG/1
75 TABLET ORAL DAILY
Status: DISCONTINUED | OUTPATIENT
Start: 2021-08-07 | End: 2021-08-15 | Stop reason: HOSPADM

## 2021-08-06 RX ORDER — ACETAMINOPHEN 325 MG/1
650 TABLET ORAL EVERY 4 HOURS PRN
Status: DISCONTINUED | OUTPATIENT
Start: 2021-08-06 | End: 2021-08-15 | Stop reason: HOSPADM

## 2021-08-06 RX ORDER — AMOXICILLIN 250 MG
2 CAPSULE ORAL 2 TIMES DAILY
Status: DISCONTINUED | OUTPATIENT
Start: 2021-08-06 | End: 2021-08-15 | Stop reason: HOSPADM

## 2021-08-06 RX ORDER — CLONIDINE HYDROCHLORIDE 0.1 MG/1
0.1 TABLET ORAL EVERY MORNING
Status: DISCONTINUED | OUTPATIENT
Start: 2021-08-07 | End: 2021-08-11

## 2021-08-06 RX ORDER — ALUMINA, MAGNESIA, AND SIMETHICONE 2400; 2400; 240 MG/30ML; MG/30ML; MG/30ML
20 SUSPENSION ORAL
Status: DISCONTINUED | OUTPATIENT
Start: 2021-08-06 | End: 2021-08-15 | Stop reason: HOSPADM

## 2021-08-06 RX ORDER — ALBUTEROL SULFATE 90 UG/1
2 AEROSOL, METERED RESPIRATORY (INHALATION) EVERY 6 HOURS PRN
Status: CANCELLED | OUTPATIENT
Start: 2021-08-06

## 2021-08-06 RX ORDER — ACETAMINOPHEN 325 MG/1
650 TABLET ORAL 3 TIMES DAILY
Status: DISCONTINUED | OUTPATIENT
Start: 2021-08-06 | End: 2021-08-15 | Stop reason: HOSPADM

## 2021-08-06 RX ORDER — CYCLOBENZAPRINE HCL 10 MG
10 TABLET ORAL 3 TIMES DAILY PRN
Status: DISCONTINUED | OUTPATIENT
Start: 2021-08-06 | End: 2021-08-06 | Stop reason: HOSPADM

## 2021-08-06 RX ORDER — CLONIDINE HYDROCHLORIDE 0.1 MG/1
0.1 TABLET ORAL EVERY MORNING
Status: CANCELLED | OUTPATIENT
Start: 2021-08-07

## 2021-08-06 RX ORDER — ISOSORBIDE MONONITRATE 60 MG/1
60 TABLET, EXTENDED RELEASE ORAL 2 TIMES DAILY
Status: CANCELLED | OUTPATIENT
Start: 2021-08-06

## 2021-08-06 RX ORDER — ALBUTEROL SULFATE 90 UG/1
2 AEROSOL, METERED RESPIRATORY (INHALATION) EVERY 6 HOURS PRN
Status: DISCONTINUED | OUTPATIENT
Start: 2021-08-06 | End: 2021-08-15 | Stop reason: HOSPADM

## 2021-08-06 RX ADMIN — MORPHINE SULFATE 15 MG: 15 TABLET ORAL at 23:46

## 2021-08-06 RX ADMIN — CLONIDINE HYDROCHLORIDE 0.1 MG: 0.1 TABLET ORAL at 08:32

## 2021-08-06 RX ADMIN — ACETAMINOPHEN 650 MG: 325 TABLET ORAL at 14:59

## 2021-08-06 RX ADMIN — DOCUSATE SODIUM 50 MG AND SENNOSIDES 8.6 MG 2 TABLET: 8.6; 5 TABLET, FILM COATED ORAL at 06:38

## 2021-08-06 RX ADMIN — MORPHINE SULFATE 2 MG: 4 INJECTION INTRAVENOUS at 06:38

## 2021-08-06 RX ADMIN — CLONIDINE HYDROCHLORIDE 0.1 MG: 0.1 TABLET ORAL at 06:44

## 2021-08-06 RX ADMIN — ACETAMINOPHEN 650 MG: 325 TABLET ORAL at 20:12

## 2021-08-06 RX ADMIN — ACETAMINOPHEN 650 MG: 325 TABLET, FILM COATED ORAL at 08:32

## 2021-08-06 RX ADMIN — METOPROLOL TARTRATE 50 MG: 25 TABLET, FILM COATED ORAL at 17:43

## 2021-08-06 RX ADMIN — LEVOTHYROXINE SODIUM 25 MCG: 0.03 TABLET ORAL at 06:43

## 2021-08-06 RX ADMIN — MORPHINE SULFATE 15 MG: 15 TABLET ORAL at 17:43

## 2021-08-06 RX ADMIN — OXYCODONE HYDROCHLORIDE 10 MG: 10 TABLET ORAL at 19:10

## 2021-08-06 RX ADMIN — ATORVASTATIN CALCIUM 40 MG: 40 TABLET, FILM COATED ORAL at 20:12

## 2021-08-06 RX ADMIN — TRAZODONE HYDROCHLORIDE 50 MG: 50 TABLET ORAL at 20:11

## 2021-08-06 RX ADMIN — OXYCODONE HYDROCHLORIDE 10 MG: 10 TABLET ORAL at 14:51

## 2021-08-06 RX ADMIN — METOPROLOL TARTRATE 50 MG: 50 TABLET, FILM COATED ORAL at 06:44

## 2021-08-06 RX ADMIN — SENNOSIDES AND DOCUSATE SODIUM 2 TABLET: 8.6; 5 TABLET ORAL at 14:59

## 2021-08-06 RX ADMIN — SERTRALINE HYDROCHLORIDE 100 MG: 50 TABLET ORAL at 20:12

## 2021-08-06 RX ADMIN — FAMOTIDINE 40 MG: 20 TABLET ORAL at 20:11

## 2021-08-06 RX ADMIN — OXYCODONE HYDROCHLORIDE 10 MG: 10 TABLET ORAL at 02:45

## 2021-08-06 RX ADMIN — POLYVINYL ALCOHOL 1 DROP: 14 SOLUTION/ DROPS OPHTHALMIC at 23:54

## 2021-08-06 RX ADMIN — ISOSORBIDE MONONITRATE 60 MG: 30 TABLET, EXTENDED RELEASE ORAL at 20:11

## 2021-08-06 RX ADMIN — ENOXAPARIN SODIUM 40 MG: 40 INJECTION SUBCUTANEOUS at 08:59

## 2021-08-06 RX ADMIN — ISOSORBIDE MONONITRATE 60 MG: 60 TABLET, EXTENDED RELEASE ORAL at 06:48

## 2021-08-06 RX ADMIN — CLOPIDOGREL BISULFATE 75 MG: 75 TABLET ORAL at 06:44

## 2021-08-06 RX ADMIN — OMEPRAZOLE 20 MG: 20 CAPSULE, DELAYED RELEASE ORAL at 06:43

## 2021-08-06 RX ADMIN — SERTRALINE HYDROCHLORIDE 50 MG: 50 TABLET ORAL at 06:43

## 2021-08-06 RX ADMIN — TAMSULOSIN HYDROCHLORIDE 0.4 MG: 0.4 CAPSULE ORAL at 20:12

## 2021-08-06 RX ADMIN — OXYCODONE HYDROCHLORIDE 10 MG: 10 TABLET ORAL at 08:59

## 2021-08-06 ASSESSMENT — PATIENT HEALTH QUESTIONNAIRE - PHQ9
2. FEELING DOWN, DEPRESSED, IRRITABLE, OR HOPELESS: NOT AT ALL
SUM OF ALL RESPONSES TO PHQ9 QUESTIONS 1 AND 2: 0
1. LITTLE INTEREST OR PLEASURE IN DOING THINGS: NOT AT ALL

## 2021-08-06 ASSESSMENT — PAIN DESCRIPTION - PAIN TYPE
TYPE: ACUTE PAIN
TYPE: ACUTE PAIN;SURGICAL PAIN
TYPE: CHRONIC PAIN;SURGICAL PAIN
TYPE: ACUTE PAIN;SURGICAL PAIN
TYPE: ACUTE PAIN

## 2021-08-06 ASSESSMENT — LIFESTYLE VARIABLES
AVERAGE NUMBER OF DAYS PER WEEK YOU HAVE A DRINK CONTAINING ALCOHOL: 0
TOTAL SCORE: 0
EVER_SMOKED: YES
ALCOHOL_USE: NO
HOW MANY TIMES IN THE PAST YEAR HAVE YOU HAD 5 OR MORE DRINKS IN A DAY: 0
EVER HAD A DRINK FIRST THING IN THE MORNING TO STEADY YOUR NERVES TO GET RID OF A HANGOVER: NO
HAVE YOU EVER FELT YOU SHOULD CUT DOWN ON YOUR DRINKING: NO
TOTAL SCORE: 0
HAVE PEOPLE ANNOYED YOU BY CRITICIZING YOUR DRINKING: NO
ON A TYPICAL DAY WHEN YOU DRINK ALCOHOL HOW MANY DRINKS DO YOU HAVE: 0
CONSUMPTION TOTAL: NEGATIVE
EVER FELT BAD OR GUILTY ABOUT YOUR DRINKING: NO
TOTAL SCORE: 0

## 2021-08-06 ASSESSMENT — FIBROSIS 4 INDEX: FIB4 SCORE: 2.28

## 2021-08-06 NOTE — PREADMISSION SCREENING NOTE
Pre-Admission Screening Form    Patient Information:   Name: Antony Akbar     MRN: [id number]       : 1955      Age: 66 y.o.   Gender: male      Race: White [7]       Marital Status: Single [1]  Family Contact: Rejanette, Traceyinia        Relationship: Significant other [13]  Home Phone: [phone]           Cell Phone:   Advanced Directives: None  Code Status:  FULL  Current Attending Provider: Kevin Flores M.D.  Referring Physician: Dr. Flores      Physiatrist Consult: Dr Christian Singh     Referral Date: 2021   Primary Payor Source:  MEDICARE  Secondary Payor Source:  MEDICAID FFS    Medical Information:   Date of Admission to Acute Care [place]  Room Number: T307/01  Rehabilitation Diagnosis: 0008.11 - Orthopaedic Disorders: Status Post Unilateral Hip Fracture    There is no immunization history on file for this patient.  Allergies   Allergen Reactions   • Bee Venom Anaphylaxis     Past Medical History:   Diagnosis Date   • Asthma    • Back pain    • BPH (benign prostatic hyperplasia)    • Chronic obstructive pulmonary disease (HCC)    • Hypertension    • Hypothyroid      Past Surgical History:   Procedure Laterality Date   • PB OPEN FIX INTER/SUBTROCH FX,IMPLNT Left 2021    Procedure: INSERTION, INTRAMEDULLARY MIKAEL, FEMUR, PROXIMAL;  Surgeon: Valentin Ríos M.D.;  Location: [facility];  Service: Orthopedics   • CARDIAC CATH, RIGHT/LEFT HEART         History Leading to Admission, Conditions that Caused the Need for Rehab (CMS):   Armond Duran M.D.   Physician   [facility]           [facility] History & Physical Note     Date of Service  2021     Primary Care Physician  No primary care provider on file.     Consultants  Orthopedic surgery (Dr. Ríos)     Code Status  Full Code     Chief Complaint      Chief Complaint   Patient presents with   • T-5000 GLF   • Hip Injury         History of Presenting Illness  66-year-old male with a past medical  history of hypothyroidism, CAD on aspirin and Plavix, COPD, and hypertension was transferred to emergency department from [place] on 8/4/2021 where he presented after ground-level fall and was noted for left hip fracture.  Patient is ambulating his walker and his doctor informed him which caused him to fall.  He tripped over his feet and into his left hip.  No loss of consciousness or head trauma.      At the emergency department, stable vital signs and patient saturating well room air.  EDP discussed case with orthopedic surgery (Dr. Ríos), which patient is scheduled for surgical repair today.  Patient was admitted for left femur fracture requiring surgical repair.          Assessment/Plan:  I anticipate this patient will require at least two midnights for appropriate medical management, necessitating inpatient admission.     * Closed left hip fracture (HCC)- (present on admission)  Assessment & Plan  Noted on outside facility x-ray  EDP discussed case with orthopedic surgery (Dr. Ríos), which patient is scheduled for OR tonight  Admit to surgical floor  Keep n.p.o.  Scheduled Tylenol  As needed morphine  Labs in a.m.      Leukocytosis- (present on admission)  Assessment & Plan  Reactive and due to acute trauma     GERD (gastroesophageal reflux disease)- (present on admission)  Assessment & Plan  Continue home Pepcid and PPI     CAD (coronary artery disease) of artery bypass graft- (present on admission)  Assessment & Plan  CAD status post CABG and 5 stents as per patient  Status post pacemaker placement  Continue home metoprolol  Continue home Imdur  As needed nitroglycerin  Hold Plavix for now     Essential hypertension- (present on admission)  Assessment & Plan  Continue home clonidine and metoprolol  As needed antihypertensives     COPD (chronic obstructive pulmonary disease) (HCC)- (present on admission)  Assessment & Plan  Stable  Continue home inhalers     Hypothyroidism- (present on  admission)  Assessment & Plan  Continue home levothyroxine        VTE prophylaxis: SCDs/TEDs          Valentin Ríos M.D.   Physician   [facility]     [facility] of Service 8/4/2021     patient was seen at the request of Dr Valdivia     HPI: Antony Akbar is a 66 y.o. male who presents with complaints of pain to left hip.  This started today after fll.  The pain is 5/10 and is described as sharp.  The pain is made worse by palpation of the area and made better by rest and immobilization.       Impression: Left intertrochanteric femur fracture     Plan:We discussed the diagnosis and findings with the patient at length.  We reviewed possible non operative and operative interventions and the risks and benefits of each of these.  he had a chance to ask questions and all of these were answered to his satisfaction. The patient chose to proceed with  operative intervention. Risks and benefits of surgery were discussed which include but are not limited to bleeding, infection, neurovascular damage, malunion, nonunion, instability, limb length discrepancy, DVT, PE, MI, Stroke and death. They understand these risks and wish to proceed.        Valentin Ríos M.D.   Physician   [facility]     DATE OF OPERATION: 8/4/2021     PREOPERATIVE DIAGNOSIS: Left intertrochanteric femur fracture      PROCEDURE PERFORMED: Surgical treatment of left intertrochanteric femur fracture with intramedullary device     Operative Plan:     1.  The patient should bear weight as tolerated on their operative extremity.  Gait aids (crutch or crutches, cane, walker) may be used as needed, and may be discontinued when no longer required.  2.  IV antibiotics - may be continued for 24 hours, but are not required.  3.  DVT prophylaxis - SCD's and Lovenox 40 mg SQ daily while inpatient.  The patient may transition to Aspirin 325 mg PO BID as an outpatient  4.  Discharge planning        Imaging Studies  :  DX-WRIST-COMPLETE 3+ RIGHT   Final  "Result           No acute osseous abnormality.       OUTSIDE IMAGES-DX LOWER EXTREMITY, LEFT   Final Result           Co-morbidities: please see below  Potential Risk - Complications: Deep Vein Thrombosis, Pain and Urinary Tract Infection  Level of Risk: High    Ongoing Medical Management Needed (Medical/Nursing Needs):   Patient Active Problem List    Diagnosis Date Noted   • Hypothyroidism 08/04/2021   • Closed left hip fracture (HCC) 08/04/2021   • COPD (chronic obstructive pulmonary disease) (HCC) 08/04/2021   • Essential hypertension 08/04/2021   • CAD (coronary artery disease) of artery bypass graft 08/04/2021   • GERD (gastroesophageal reflux disease) 08/04/2021   • Leukocytosis 08/04/2021       Current Vital Signs:   Temperature: 36.6 °C (97.8 °F) Pulse: 74 Respiration: 16 Blood Pressure: (!) 175/86 (notified RN)  Weight: 85.5 kg (188 lb 7.9 oz) Height: 171.5 cm (5' 7.5\")  Pulse Oximetry: 95 % O2 (LPM): 0      Completed Laboratory Reports:  Recent Labs     08/04/21  1642 08/05/21  0628 08/06/21  0728   WBC 20.3* 14.8* 14.2*   HEMOGLOBIN 13.1* 11.3* 11.2*   HEMATOCRIT 40.9* 36.1* 37.4*   PLATELETCT 195 146* 154*   SODIUM 143 141  --    POTASSIUM 4.3 4.5  --    BUN 8 10  --    CREATININE 0.97 0.92  --    ALBUMIN 4.1 3.4  --    GLUCOSE 110* 108*  --      Additional Labs: Not Applicable    Prior Living Situation:   Housing / Facility: 1 Story House  Steps Into Home:  (ramp to enter)  Lives with - Patient's Self Care Capacity: Spouse  Equipment Owned: Power Wheel Chair, 4-Wheel Walker, Front-Wheel Walker    Prior Level of Function / Living Situation:   Physical Therapy: Prior Services: Home-Independent  Housing / Facility: 1 Story House  Steps Into Home:  (ramp to enter)  Bathroom Set up: Bathtub / Shower Combination, Shower Chair  Equipment Owned: Power Wheel Chair, 4-Wheel Walker, Front-Wheel Walker  Lives with - Patient's Self Care Capacity: Spouse  Bed Mobility: Independent  Transfer Status: " Independent  Ambulation: Independent  Distance Ambulation (Feet):  (household)  Assistive Devices Used: 4-Wheel Walker  Wheelchair: Independent  Stairs: Required Assist  Current Level of Function:   Gait Level Of Assist: Maximal Assist (5 separate LOBs MAX to recover with LE buckling. CECIL otherw)  Assistive Device: Front Wheel Walker  Distance (Feet): 12  Deviation: Antalgic, Shuffled Gait, Step To (Multiple LOBs MAX to recover. Otherwise CECIL)  # of Stairs Climbed: 0  Weight Bearing Status: WBAT LLE  Supine to Sit: Minimal Assist  Sit to Supine: Moderate Assist  Scooting: Minimal Assist  Rolling: Minimum Assist to Lt.  Comments: LE support  Sit to Stand: Moderate Assist  Bed, Chair, Wheelchair Transfer: Moderate Assist  Toilet Transfers: Moderate Assist  Transfer Method: Stand Step  Sitting in Chair: toilet <5  Sitting Edge of Bed: 5  Standing: >10  Occupational Therapy:   Self Feeding: Independent  Grooming / Hygiene: Requires Assist  Bathing: Requires Assist  Dressing: Requires Assist  Toileting: Independent  Medication Management: Requires Assist  Laundry: Requires Assist  Kitchen Mobility: Requires Assist  Finances: Requires Assist  Home Management: Dependent  Shopping: Dependent  Prior Level Of Mobility: Independent With Device in Home, Uses Wheel Chair for Community Mobility  Prior Services: Home-Independent  Housing / Facility: [address] [place] [address]  Current Level of Function:   Lower Body Dressing: Maximal Assist  Toileting: Moderate Assist (william)  Speech Language Pathology:  Not following      Rehabilitation Prognosis/Potential: Good  Estimated Length of Stay: 10 - 14  days    Nursing:    Continent Fall Risk, Weight Bearing As Tolerated Left Lower Extremity     Assessment    Scope/Intensity of Services Recommended:  Physical Therapy: 1.5  hr / day  5 days / week. Therapeutic Interventions Required: Maximize Endurance, Mobility, Strength and Safety  Occupational Therapy: 1. 5 hr / day 5 days / week. Therapeutic  Interventions Required: Maximize Self Care, ADLs, IADLs, Energy Conservation and family training  Rehabilitation Nursin/7:  Please see below.   Rehabilitation Physician: 3 - 5 days / week. Therapeutic Interventions Required: Medical Management  Respiratory Care: Eval and Tx. Therapeutic Interventions Required: Per Protocol.  Dietician: Eval and Tx. Therapeutic Interventions Required: Per protocol    He/She requires 24-hour rehabilitation nursing to manage bowel and bladder function, skin care, surgical incision, nutrition and fluid intake, pain control, safety, medication management and patient/family goals. In addition, rehabilitation nursing will reiterate and reinforce therapy skills and equipment use, including ADLs, as well as provide education to the patient and family. He/sheis willing to participate in and is able to tolerate the proposed plan of care.    Rehabilitation Goals and Plan (Expected frequency & duration of treatment in the IRF):   Return to the Community  Anticipated Date of Rehabilitation Admission: 2021   Patient/Family oriented IRF level of care/facility/plan: Yes  Patient/Family willing to participate in IRF care/facility/plan: YES  Patient able to tolerate IRF level of care proposed: YES  Patient has potential to benefit IRF level of care proposed: yes       Special Needs or Precautions - Medical Necessity:Fall Risk, Weight Bearing As Tolerated Left Lower Extremity     Assessment  Current Medications:    Current Facility-Administered Medications Ordered in Epic   Medication Dose Route Frequency Provider Last Rate Last Admin   • enoxaparin (LOVENOX) inj 40 mg  40 mg Subcutaneous DAILY Kevin Flores M.D.   40 mg at 21 0859   • oxyCODONE immediate release (ROXICODONE) tablet 10 mg  10 mg Oral Q6HRS PRN Ar Sahu PTristinA.-C.   10 mg at 21 0859    Or   • oxyCODONE immediate-release (ROXICODONE) tablet 5 mg  5 mg Oral Q6HRS PRN Ar Sahu P.A.-C.       • clopidogrel  (PLAVIX) tablet 75 mg  75 mg Oral DAILY Kevin Flores M.D.   75 mg at 08/06/21 0644   • butalbital/apap/caffeine -40 mg (Fioricet) per tablet 1 tablet  1 tablet Oral Q4HRS PRN Armond Duran M.D.       • albuterol inhaler 2 Puff  2 Puff Inhalation Q6HRS PRN Armond Duran M.D.       • atorvastatin (LIPITOR) tablet 40 mg  40 mg Oral Nightly Armond Duran M.D.   40 mg at 08/05/21 2032   • cloNIDine (CATAPRES) tablet 0.1 mg  0.1 mg Oral QAM Armond Duran M.D.   0.1 mg at 08/06/21 0644   • famotidine (PEPCID) tablet 40 mg  40 mg Oral Q EVENING Armond Duran M.D.   40 mg at 08/05/21 1742   • isosorbide mononitrate SR (IMDUR) tablet 60 mg  60 mg Oral BID Armond Duran M.D.   60 mg at 08/06/21 0648   • levothyroxine (SYNTHROID) tablet 25 mcg  25 mcg Oral Once per day on Mon Tue Wed Thu Fri Armond Duran M.D.   25 mcg at 08/06/21 0643   • [START ON 8/7/2021] levothyroxine (SYNTHROID) tablet 50 mcg  50 mcg Oral Once per day on Sun Sat Armond Duran M.D.       • metoprolol tartrate (LOPRESSOR) tablet 50 mg  50 mg Oral BID Armond Duran M.D.   50 mg at 08/06/21 0644   • nitroglycerin (NITROSTAT) tablet 0.4 mg  0.4 mg Sublingual Q5 MIN PRN Armond Duran M.D.       • omeprazole (PRILOSEC) capsule 20 mg  20 mg Oral QAM Armond Duran M.D.   20 mg at 08/06/21 0643   • sertraline (Zoloft) tablet 100 mg  100 mg Oral Q EVENING Armondrafael Duran M.D.   100 mg at 08/05/21 1741   • sertraline (Zoloft) tablet 50 mg  50 mg Oral QAM Armond Duran M.D.   50 mg at 08/06/21 0643   • tamsulosin (FLOMAX) capsule 0.4 mg  0.4 mg Oral Q EVENING Armondrafael Duran M.D.   0.4 mg at 08/05/21 1742   • senna-docusate (PERICOLACE or SENOKOT S) 8.6-50 MG per tablet 2 tablet  2 tablet Oral BID Armond Duran M.D.   2 tablet at 08/06/21 0638    And   • polyethylene glycol/lytes  (MIRALAX) PACKET 1 Packet  1 Packet Oral QDAY PRN Armond Duran M.D.        And   • magnesium hydroxide (MILK OF MAGNESIA) suspension 30 mL  30 mL Oral QDAY PRN Armond Duran M.D.        And   • bisacodyl (DULCOLAX) suppository 10 mg  10 mg Rectal QDAY PRN Armond Duran M.D.       • acetaminophen (Tylenol) tablet 650 mg  650 mg Oral TID Armondreed Duran M.D.   650 mg at 08/06/21 0832   • cloNIDine (CATAPRES) tablet 0.1 mg  0.1 mg Oral Q6HRS PRN Armond Duran M.D.   0.1 mg at 08/06/21 0832   • enalaprilat (VASOTEC) injection 1.25 mg  1.25 mg Intravenous Q6HRS PRN Amrond Duran M.D.       • labetalol (NORMODYNE/TRANDATE) injection 10 mg  10 mg Intravenous Q4HRS PRN Armond Duran M.D.       • ondansetron (ZOFRAN) syringe/vial injection 4 mg  4 mg Intravenous Q4HRS PRN Armond Duran M.D.       • ondansetron (ZOFRAN ODT) dispertab 4 mg  4 mg Oral Q4HRS PRN Armond Duran M.D.       • promethazine (PHENERGAN) tablet 12.5-25 mg  12.5-25 mg Oral Q4HRS PRN Armond Duran M.D.       • promethazine (PHENERGAN) suppository 12.5-25 mg  12.5-25 mg Rectal Q4HRS PRN Armond Duran M.D.       • prochlorperazine (COMPAZINE) injection 5-10 mg  5-10 mg Intravenous Q4HRS PRN Armond Duran M.D.       • morphine (pf) 4 mg/mL injection 2 mg  2 mg Intravenous Q6HRS PRN Valentin Ríos M.D.   2 mg at 08/06/21 0638     No current Epic-ordered outpatient medications on file.     Diet:   DIET ORDERS (From admission to next 24h)     Start     Ordered    08/05/21 1554  Supplements  2X A DAY     Question:  Which Supplement  Answer:  BOOST PLUS  Comment:  CHOCOLATE FLAVOR    08/05/21 1554    08/05/21 0746  Diet Order Diet: Regular  ALL MEALS     Question:  Diet:  Answer:  Regular    08/05/21 0745                Anticipated Discharge Destination / Patient/Family Goal:  Destination: Home with Assistance from SO;   Single story home w/ ramp in [place]. Support System: LISBET Hassan  who works from 430 am to 11 am Monday through Friday; she assists w/ transportation and helps as needed.   PLOF :  PT able to get in /out of bed; walk to bathroom using fww - at times he would use the bedside commode due to urgency.   Anticipated home health services: OT, PT and Nursing  Previously used HH service/ provider: Not Applicable  Anticipated DME Needs: Pt has a fww/bedside commode/power chair; 4ww.    Outpatient Services: to be determined  Alternative resources to address additional identified needs:   Follow up with pcp Dr Shimon Hope in [place]  and Dr Krishnan     Pre-Screen Completed: 8/6/2021 10:13 AM EMMA Araujo, CCM

## 2021-08-06 NOTE — PROGRESS NOTES
"- Patient discharged to [facility] via medical transport with hospital escort. Patient transported via wheelchair with escort.  - AVS reviewed, signed and dated by patient.   - Controlled Substance contract reviewed, signed and dated by patient.   - No paper prescriptions required (Rx electronically sent to pharmacy of choice).  - Patient's PIV left in place per [facility] RN request.   - Patient understands need for follow-up care and understands instructions to make appointment with surgical team.  - Patient is A+O x 4 and verbalized understanding of instructions. Vital signs stable and patient is content with care and plan for discharge. All belongings collected by patient and discharged with patient.    /84   Pulse 79   Temp 36.6 °C (97.8 °F) (Temporal)   Resp 16   Ht 1.715 m (5' 7.5\")   Wt 85.5 kg (188 lb 7.9 oz)   SpO2 95%       "

## 2021-08-06 NOTE — PROGRESS NOTES
Patient admitted to [facility] via hospital transport.  Patient assisted to room and positioned in bed for comfort and safety; call light within reach.  Patient assisted with stowing belongings and oriented to room and facility.  Admission assessment performed and documented in computer. Patient received and reviewed education binder. Admission paperwork completed; signed copies placed in chart.  Will continue to monitor.     2 RN skin check complete.   All wounds photographed and documented.   Skin checked under devices and any injuries or breakdown photographed and documented.  Dressings removed, photographed and documented and dressings replaced if indicated.   Skin check completed with: Jessa

## 2021-08-06 NOTE — H&P
REHABILITATION HISTORY AND PHYSICAL/POST ADMISSION PHYSICAL EVALUATION    Date of Admission: 8/6/2021  Antony Akbar  RH25/02    [facility] Code / Diagnosis to Support: 0008.11 - Orthopaedic Disorders: Status Post Unilateral Hip Fracture  Etiologic Diagnosis: Closed left hip fracture (HCC)    CC: Decreased mobility, leg numbness, back pain    HPI:  The patient is a 66 y.o. male with a past medical history of CAD, COPD, HTN, Hypothyroidism, and back pain who presented on 8/4/21 with mechanical ground level fall. He presented to outside clinic in [place] and found to have left hip fracture.  Patient reportedly is limited in ambulation due to back pain and he tripped over his dog when walking with his walker.  Orthopedics was consulted and recommended surgical intervention. Patient underwent left intramedullary device with Dr. Ríos on 8/4/21.  Post-operatively patient's course has been complicated by anemia, leukocytosis, thrombocytopenia and left leg numbness. Patient also had right wrist pain from the fall.  XR of his right wrist were negative for fracture.     Patient tolerated transfer to [facility]. He reports he is normally on Morphine 15 mg IR q6h for his back pain. Verified his dosing in PDMP.  He reports his SO keeps him on a very strict schedule.  He reports his left leg is more numb since the fall. He reports movement in all major muscle groups but weakness from pain.  Denies NVD. Denies SOB or cough.  Denies fever or chills.     REVIEW OF SYSTEMS:     Comprehensive 14 point ROS was reviewed and all were negative except as noted elsewhere in this document.     PMH:  Past Medical History:   Diagnosis Date   • Asthma    • Back pain    • BPH (benign prostatic hyperplasia)    • Chronic obstructive pulmonary disease (HCC)    • Hypertension    • Hypothyroid        PSH:  Past Surgical History:   Procedure Laterality Date   • PB OPEN FIX INTER/SUBTROCH FX,IMPLNT Left 8/4/2021    Procedure: INSERTION, INTRAMEDULLARY MIKAEL,  FEMUR, PROXIMAL;  Surgeon: Valentin Ríos M.D.;  Location: SURGERY [facility];  Service: Orthopedics   • CARDIAC CATH, RIGHT/LEFT HEART         FAMILY HISTORY:  No family history on file.   No family history pertinent to fall with hip fracture.     MEDICATIONS:  Current Facility-Administered Medications   Medication Dose   • Respiratory Therapy Consult     • Pharmacy Consult Request ...Pain Management Review 1 Each  1 Each   • oxyCODONE immediate-release (ROXICODONE) tablet 5 mg  5 mg    Or   • oxyCODONE immediate release (ROXICODONE) tablet 10 mg  10 mg   • hydrALAZINE (APRESOLINE) tablet 25 mg  25 mg   • acetaminophen (Tylenol) tablet 650 mg  650 mg   • senna-docusate (PERICOLACE or SENOKOT S) 8.6-50 MG per tablet 2 tablet  2 tablet    And   • polyethylene glycol/lytes (MIRALAX) PACKET 1 Packet  1 Packet    And   • magnesium hydroxide (MILK OF MAGNESIA) suspension 30 mL  30 mL    And   • bisacodyl (DULCOLAX) suppository 10 mg  10 mg   • artificial tears ophthalmic solution 1 Drop  1 Drop   • benzocaine-menthol (CEPACOL) lozenge 1 Lozenge  1 Lozenge   • mag hydrox-al hydrox-simeth (MAALOX PLUS ES or MYLANTA DS) suspension 20 mL  20 mL   • ondansetron (ZOFRAN ODT) dispertab 4 mg  4 mg    Or   • ondansetron (ZOFRAN) syringe/vial injection 4 mg  4 mg   • traZODone (DESYREL) tablet 50 mg  50 mg   • sodium chloride (OCEAN) 0.65 % nasal spray 2 Spray  2 Spray   • acetaminophen (Tylenol) tablet 650 mg  650 mg   • albuterol inhaler 2 Puff  2 Puff   • atorvastatin (LIPITOR) tablet 40 mg  40 mg   • butalbital/apap/caffeine -40 mg (Fioricet) per tablet 1 tablet  1 tablet   • [START ON 8/7/2021] cloNIDine (CATAPRES) tablet 0.1 mg  0.1 mg   • [START ON 8/7/2021] clopidogrel (PLAVIX) tablet 75 mg  75 mg   • [START ON 8/7/2021] enoxaparin (LOVENOX) inj 40 mg  40 mg   • famotidine (PEPCID) tablet 40 mg  40 mg   • isosorbide mononitrate SR (IMDUR) tablet 60 mg  60 mg   • [START ON 8/9/2021] levothyroxine (SYNTHROID)  tablet 25 mcg  25 mcg   • [START ON 8/7/2021] levothyroxine (SYNTHROID) tablet 50 mcg  50 mcg   • metoprolol tartrate (LOPRESSOR) tablet 50 mg  50 mg   • [START ON 8/7/2021] omeprazole (PRILOSEC) capsule 20 mg  20 mg   • sertraline (Zoloft) tablet 100 mg  100 mg   • [START ON 8/7/2021] sertraline (Zoloft) tablet 50 mg  50 mg   • tamsulosin (FLOMAX) capsule 0.4 mg  0.4 mg   • morphine (MS IR) tablet 15 mg  15 mg       ALLERGIES:  Bee venom    PSYCHOSOCIAL HISTORY:  Housing / Facility: 1 Story House  Steps Into Home:  (ramp to enter)  Lives with - Patient's Self Care Capacity: Spouse  Equipment Owned: Power Wheel Chair, 4-Wheel Walker, Front-Wheel Walker     Prior Level of Function / Living Situation:   Physical Therapy: Prior Services: Home-Independent  Housing / Facility: 1 Story House  Steps Into Home:  (ramp to enter)  Bathroom Set up: Bathtub / Shower Combination, Shower Chair  Equipment Owned: Power Wheel Chair, 4-Wheel Walker, Front-Wheel Walker  Lives with - Patient's Self Care Capacity: Spouse  Bed Mobility: Independent  Transfer Status: Independent  Ambulation: Independent  Distance Ambulation (Feet):  (household)  Assistive Devices Used: 4-Wheel Walker  Wheelchair: Independent  Stairs: Required Assist  Current Level of Function:   Gait Level Of Assist: Maximal Assist (5 separate LOBs MAX to recover with LE buckling. CECIL otherw)  Assistive Device: Front Wheel Walker  Distance (Feet): 12  Deviation: Antalgic, Shuffled Gait, Step To (Multiple LOBs MAX to recover. Otherwise CECIL)  # of Stairs Climbed: 0  Weight Bearing Status: WBAT LLE  Supine to Sit: Minimal Assist  Sit to Supine: Moderate Assist  Scooting: Minimal Assist  Rolling: Minimum Assist to Lt.  Comments: LE support  Sit to Stand: Moderate Assist  Bed, Chair, Wheelchair Transfer: Moderate Assist  Toilet Transfers: Moderate Assist  Transfer Method: Stand Step  Sitting in Chair: toilet <5  Sitting Edge of Bed: 5  Standing: >10  Occupational Therapy:  "  Self Feeding: Independent  Grooming / Hygiene: Requires Assist  Bathing: Requires Assist  Dressing: Requires Assist  Toileting: Independent  Medication Management: Requires Assist  Laundry: Requires Assist  Kitchen Mobility: Requires Assist  Finances: Requires Assist  Home Management: Dependent  Shopping: Dependent  Prior Level Of Mobility: Independent With Device in Home, Uses Wheel Chair for Community Mobility  Prior Services: Home-Independent  Housing / Facility: [address] [place] [address]  Current Level of Function:   Lower Body Dressing: Maximal Assist  Toileting: Moderate Assist (thomas)    CURRENT LEVEL OF FUNCTION:   Same as level of function prior to admission to [facility]    PHYSICAL EXAM:     VITAL SIGNS:   height is 1.707 m (5' 7.2\") and weight is 86.5 kg (190 lb 11.2 oz). His temporal temperature is 36.4 °C (97.6 °F). His blood pressure is 143/63 and his pulse is 76. His respiration is 18 and oxygen saturation is 91%.     GENERAL: No apparent distress  HEENT: Normocephalic/atraumatic, EOMI and PERRL  CARDIAC: Regular rate and rhythm, normal S1, S2   LUNGS: Clear to auscultation   ABDOMINAL: bowel sounds present, soft and nontender    EXTREMITIES: no contractures, spasticity, or edema. Bruising to left thigh, staples intact to incision site, no erythema.   NEURO:  Mental status:  A&Ox4 (person, place, date, situation) answers questions appropriately  Speech: fluent, no aphasia or dysarthria  Motor:  5/5 BUE  5/5 RLE, 3/5 L HF/KE, 3+/5 L ADF/APF  Sensory: Decreased left thigh and foot 50%  DTRs: 2+ in bilateral biceps and patellar tendons      RADIOLOGY:  Post-operative imaging.         LABS:  Recent Labs     08/04/21 1642 08/05/21  0628   SODIUM 143 141   POTASSIUM 4.3 4.5   CHLORIDE 110 108   CO2 20 23   GLUCOSE 110* 108*   BUN 8 10   CREATININE 0.97 0.92   CALCIUM 8.9 8.5     Recent Labs     08/04/21 1642 08/05/21  0628 08/06/21  0728   WBC 20.3* 14.8* 14.2*   RBC 4.71 4.12* 4.13* "   HEMOGLOBIN 13.1* 11.3* 11.2*   HEMATOCRIT 40.9* 36.1* 37.4*   MCV 86.8 87.6 90.6   MCH 27.8 27.4 27.1   MCHC 32.0* 31.3* 29.9*   RDW 44.3 43.9 45.7   PLATELETCT 195 146* 154*   MPV 11.1 10.6 11.1             PRIMARY REHAB DIAGNOSIS:    This patient is a 66 y.o. male admitted for acute inpatient rehabilitation with Closed left hip fracture (HCC).    IMPAIRMENTS:   ADLs/IADLs  Mobility    SECONDARY DIAGNOSIS/MEDICAL CO-MORBIDITIES AFFECTING FUNCTION:  CAD  COPD  Back pain  Anemia  Leukocytosis  Hypothyroidism     RELEVANT CHANGES SINCE PREADMISSION EVALUATION:    Status unchanged    The patient's rehabilitation potential is Very Good  The patient's medical prognosis is good    PLAN:   Discussion and Recommendations:   1. The patient requires an acute inpatient rehabilitation program with a coordinated program of care at an intensity and frequency not available at a lower level of care. This recommendation is substantiated by the patient's medical physicians who recommend that the patient's intervention and assessment of medical issues needs to be done at an acute level of care for patient's safety and maximum outcome.   2. A coordinated program of care will be supplied by an interdisciplinary team of physical therapy, occupational therapy, rehab physician, rehab nursing, and, if needed, speech therapy and rehab psychology. Rehab team presents a patient-specific rehabilitation and education program concentrating on prevention of future problems related to accessibility, mobility, skin, bowel, bladder, sexuality, and psychosocial and medical/surgical problems.   3. Need for Rehabilitation Physician: The rehab physician will be evaluating the patient on a multi-weekly basis to help coordinate the program of care. The rehab physician communicates between medical physicians, therapists, and nurses to maximize the patient's potential outcome. Specific areas in which the rehab physician will be providing daily assessment  include the following:   A. Assessing the patient's heart rate and blood pressure response (vitals monitoring) to activity and making adjustments in medications or conservative measures as needed.   B. The rehab physician will be assessing the frequency at which the program can be increased to allow the patient to reach optimal functional outcome.   C. The rehab physician will also provide assessments in daily skin care, especially in light of patient's impairments in mobility.   D. The rehab physician will provide special expertise in understanding how to work with functional impairment and recommend appropriate interventions, compensatory techniques, and education that will facilitate the patient's outcome.   4. Rehab R.N.   The rehab RN will be working with patient to carry over in room mobility and activities of daily living when the patient is not in 3 hours of skilled therapy. Rehab nursing will be working in conjunction with rehab physician to address all the medical issues above and continue to assess laboratory work and discuss abnormalities with the treating physicians, assess vitals, and response to activity, and discuss and report abnormalities with the rehab physician. Rehab RN will also continue daily skin care, supervise bladder/bowel program, instruct in medication administration, and ensure patient safety.   5. Rehab Therapy: Therapies to treat at intensity and frequency of (may change after completion of evaluation by all therapeutic disciplines):       PT:  Physical therapy to address mobility, transfer, gait training and evaluation for adaptive equipment needs 1 and 1/2 hour/day at least 5 days/week for the duration of the ELOS (see below)       OT:  Occupational therapy to address ADLs, self-care, home management training, functional mobility/transfers and assistive device evaluation, and community re-integration 1 and 1/2 hour/day at least 5 days/week for the duration of the ELOS (see below).      Medical management / Rehabilitation Issues/ Adverse Potential as part of rehabilitation plan     REHABILITATION ISSUES/ADVERSE POTENTIAL:  1. Left hip fracture - GLF on 8/4/21 due to dog found to have left hip fracture. Patient is now s/p intramedullary device with Dr. Ríos on 8/4/21. WBAT. Patient demonstrates functional deficits in strength, balance, coordination, and ADL's. Patient is admitted to [facility] for comprehensive rehabilitation therapy as described below.   Rehabilitation nursing monitors bowel and bladder control, educates on medication administration, co-morbidities and monitors patient safety.    2.  Neurostimulants: None at this time but continue to assess daily for need to initiate should status change.    3.  DVT prophylaxis:  Patient is on Lovenox for anticoagulation upon transfer. Encourage OOB. Monitor daily for signs and symptoms of DVT including but not limited to swelling and pain to prevent the development of DVT that may interfere with therapies.    4.  GI prophylaxis:  On prilosec to prevent gastritis/dyspepsia which may interfere with therapies.    5.  Pain: No issues with pain currently / Controlled with Morphine/APAP/Oxycodone    6.  Nutrition/Dysphagia: Dietician monitors nutrient intake, recommend supplements prn and provide nutrition education to pt/family to promote optimal nutrition for wound healing/recovery.     7.  Bladder/bowel:  Start bowel and bladder program as described below, to prevent constipation, urinary retention (which may lead to UTI), and urinary incontinence (which will impact upon pt's functional independence).   - Post void bladder scans, I&O cath for PVRs >400  - up to commode after meal     8.  Skin/dermal ulcer prophylaxis: Monitor for new skin conditions with q.2 h. turns as required to prevent the development of skin breakdown.     9.  Cognition/Behavior: As needed psychologist provides adjustment counseling to illness and  psychosocial barriers that may be potential barriers to rehabilitation.     10. Respiratory therapy: RT performs O2 management prn, breathing retraining, pulmonary hygiene and bronchospasm management prn to optimize participation in therapies.     MEDICAL CO-MORBIDITIES/ADVERSE POTENTIAL AFFECTING FUNCTION:  Left hip fracture - GLF on 8/4/21 due to dog found to have left hip fracture. Patient is now s/p intramedullary device with Dr. Ríos on 8/4/21. WBAT. Has some numbness to left leg, will monitor.   -PT and OT for mobility and ADLs  -Follow-up Orthopedic surgery.     Post-op/Back pain - Patient reports having pain contract for Morphine 15 mg q6h. Will continue  - PRN Tylenol    CAD/HTN - Patient on Clonidine 0.1 mg daily, Imdur 60 mg BID, Metoprolol 50 mg BID.  Patient on Plavix 75 mg daily. Will monitor     HLD - Patient on Atorvastatin 40 mg QHS    COPD - Not on medications. Will monitor     Anemia - Check AM CBC    Leukocytosis - May be reactionary, check AM CBC    Hypothyroidism - Patient on Levothyroxine 25 mcg M-F and 50 mcg Sun-Sat    Depression/Sleep - Patient on Zoloft 50/100 mg     BPH - Patient on Flomax on transfer.     DVT Ppx - Patient has been cleared for Lovenox.     I personally performed a complete drug regimen review and no potential clinically significant medication issues were identified.     Pt was seen today for 72 min, and entire time spent in face-to-face contact was >50% in counseling and coordination of care as detailed in A/P above.        GOALS/EXPECTED LEVEL OF FUNCTION BASED ON CURRENT MEDICAL AND FUNCTIONAL STATUS (may change based on patient's medical status and rate of impairment recovery):  Transfers:   Modified Independent  Mobility/Gait:   Modified Independent  ADL's:   Modified Independent    DISPOSITION: Discharge to pre-morbid independent living setting with the supportive care of patient's family.    ELOS: 10-14 days

## 2021-08-06 NOTE — DISCHARGE PLANNING
KENW received phone call from Teri with [facility] who reported they will look into this pt.    Addendum @[phone]  LSW received call from Teri who stated they are able to accept this pt today. KENW notified pt at bedside, he is agreeable to go to [facility]. KENW notifed APOLLO Flores and ARNULFO Mclaughlin.

## 2021-08-06 NOTE — CARE PLAN
The patient is Stable - Low risk of patient condition declining or worsening    Shift Goals  Clinical Goals: Ambulate, pain management  Patient Goals: Ambulate, pain management    Progress made toward(s) clinical / shift goals:  Patient receiving PRN IV and PO medication for pain. Dressing changed to thigh incision. VSS. Will continue to monitor.    Patient is not progressing towards the following goals:      Problem: Pain - Standard  Goal: Alleviation of pain or a reduction in pain to the patient’s comfort goal  Outcome: Progressing     Problem: Knowledge Deficit - Standard  Goal: Patient and family/care givers will demonstrate understanding of plan of care, disease process/condition, diagnostic tests and medications  Outcome: Progressing     Problem: Fall Risk  Goal: Patient will remain free from falls  Outcome: Progressing

## 2021-08-06 NOTE — DISCHARGE SUMMARY
Discharge Summary    CHIEF COMPLAINT ON ADMISSION  Chief Complaint   Patient presents with   • T-5000 GLF   • Hip Injury       Reason for Admission  ems     CODE STATUS  Full Code    HPI & HOSPITAL COURSE    66-year-old male with past medical history of CAD, hypothyroidism, COPD, hypertension presenting after ground-level fall which resulted in left intertrochanteric femur fracture.  Orthopedics was consulted and patient underwent intramedullary device placement.  Patient was evaluated by PT/OT and placement was recommended. Pt has good support at home.    Therefore, he is discharged in good and stable condition to an inpatient rehabilitation hospital.    The patient met 2-midnight criteria for an inpatient stay at the time of discharge.      FOLLOW UP ITEMS POST DISCHARGE  none    DISCHARGE DIAGNOSES  Principal Problem:    Closed left hip fracture (HCC) POA: Yes  Active Problems:    Hypothyroidism POA: Yes    COPD (chronic obstructive pulmonary disease) (HCC) POA: Yes    Essential hypertension POA: Yes    CAD (coronary artery disease) of artery bypass graft POA: Yes    GERD (gastroesophageal reflux disease) POA: Yes    Leukocytosis POA: Yes  Resolved Problems:    * No resolved hospital problems. *      FOLLOW UP  No future appointments.  Valentin Ríos M.D.  [address] [facility] [address]  [phone]    In 2 weeks  For wound re-check    Suman Hope D.O.  [address] [address]  [phone]    Call  As needed      MEDICATIONS ON DISCHARGE     Medication List      CONTINUE taking these medications      Instructions   albuterol 108 (90 Base) MCG/ACT Aers inhalation aerosol   Inhale 2 Puffs every 6 hours as needed for Shortness of Breath.  Dose: 2 Puff     atorvastatin 40 MG Tabs  Commonly known as: LIPITOR   Take 40 mg by mouth every evening.  Dose: 40 mg     cloNIDine 0.1 MG Tabs  Commonly known as: CATAPRES   Take 0.1 mg by mouth every morning.  Dose: 0.1 mg     clopidogrel 75 MG  Tabs  Commonly known as: PLAVIX   Take 75 mg by mouth every morning.  Dose: 75 mg     cyclobenzaprine 10 mg Tabs  Commonly known as: Flexeril   Take 10 mg by mouth 3 times a day as needed for Moderate Pain.  Dose: 10 mg     EpiPen 2-Jason 0.3 MG/0.3ML Soaj solution for injection  Generic drug: EPINEPHrine   Inject 0.3 mg into the shoulder, thigh, or buttocks as needed.  Dose: 0.3 mg     famotidine 40 MG Tabs  Commonly known as: PEPCID   Take 40 mg by mouth every evening.  Dose: 40 mg     Fioricet -40 MG Caps capsule  Generic drug: acetaminophen/caffeine/butalbital 300-40-50 mg   Take 1 capsule by mouth every four hours as needed for Headache.  Dose: 1 capsule     gabapentin 100 MG Caps  Commonly known as: NEURONTIN   Take 100 mg by mouth 4 times a day.  Dose: 100 mg     isosorbide mononitrate SR 60 MG Tb24  Commonly known as: IMDUR   Take 60 mg by mouth 2 times a day.  Dose: 60 mg     * levothyroxine 25 MCG Tabs  Commonly known as: SYNTHROID   Take 25 mcg by mouth see administration instructions. Monday thru Friday only  Dose: 25 mcg     * levothyroxine 50 MCG Tabs  Commonly known as: SYNTHROID   Take 50 mcg by mouth two times a week. Saturday & Sunday Only  Dose: 50 mcg     metoclopramide 5 MG/5ML Soln  Commonly known as: REGLAN   Take 10 mg by mouth 4 times a day.  Dose: 10 mg     metoprolol tartrate 50 MG Tabs  Commonly known as: LOPRESSOR   Take 50 mg by mouth 2 times a day.  Dose: 50 mg     morphine 15 MG tablet  Commonly known as: MS IR   Take 15 mg by mouth in the morning, at noon, and at bedtime.  Dose: 15 mg     nitroglycerin 0.4 MG Subl  Commonly known as: NITROSTAT   Place 0.4 mg under the tongue every 5 minutes as needed for Chest Pain.  Dose: 0.4 mg     pantoprazole 40 MG Tbec  Commonly known as: PROTONIX   Take 40 mg by mouth every morning.  Dose: 40 mg     potassium chloride SA 20 MEQ Tbcr  Commonly known as: Kdur   Take 20 mEq by mouth every morning.  Dose: 20 mEq     * sertraline 100 MG  Tabs  Commonly known as: Zoloft   Take 100 mg by mouth every evening.  Dose: 100 mg     * sertraline 50 MG Tabs  Commonly known as: Zoloft   Take 50 mg by mouth every morning.  Dose: 50 mg     tamsulosin 0.4 MG capsule  Commonly known as: FLOMAX   Take 0.4 mg by mouth every evening.  Dose: 0.4 mg         * This list has 4 medication(s) that are the same as other medications prescribed for you. Read the directions carefully, and ask your doctor or other care provider to review them with you.                Allergies  Allergies   Allergen Reactions   • Bee Venom Anaphylaxis       DIET  Orders Placed This Encounter   Procedures   • Diet Order Diet: Regular     Standing Status:   Standing     Number of Occurrences:   1     Order Specific Question:   Diet:     Answer:   Regular [1]       ACTIVITY  As tolerated and directed by rehab.  Weight bearing as tolerated    LINES, DRAINS, AND WOUNDS  This is an automated list. Peripheral IVs will be removed prior to discharge.  Peripheral IV 08/04/21 20 G Right Antecubital (Active)   Site Assessment Clean;Dry;Intact 08/06/21 0832   Dressing Type Transparent 08/06/21 0832   Line Status Saline locked;Flushed;Scrubbed the hub prior to access 08/06/21 0832   Dressing Status Clean;Dry;Intact 08/06/21 0832   Dressing Intervention N/A 08/06/21 0832   Dressing Change Due 08/07/21 08/05/21 0900   Date Primary Tubing Changed 08/04/21 08/04/21 2300   NEXT Primary Tubing Change  08/07/21 08/04/21 2300   Infiltration Grading ([facility], [facility]) 0 08/06/21 0832   Phlebitis Scale ([facility] Only) 0 08/06/21 0832       Wound 08/04/21 Incision Hip Left xeroform, 4x4s, tegaderm (Active)   Site Assessment LUKE;Other (Comment) 08/06/21 0832   Periwound Assessment LUKE 08/06/21 0832   Margins LUKE 08/06/21 0832   Closure LUKE 08/06/21 0832   Drainage Amount Scant 08/06/21 0832   Drainage Description Serosanguineous 08/05/21 2035   Dressing Options Dry Gauze;Transparent Film 08/06/21 0832   Dressing Changed  Observed 08/06/21 0832   Dressing Status Clean;Dry;Intact 08/06/21 0832       Peripheral IV 08/04/21 20 G Right Antecubital (Active)   Site Assessment Clean;Dry;Intact 08/06/21 0832   Dressing Type Transparent 08/06/21 0832   Line Status Saline locked;Flushed;Scrubbed the hub prior to access 08/06/21 0832   Dressing Status Clean;Dry;Intact 08/06/21 0832   Dressing Intervention N/A 08/06/21 0832   Dressing Change Due 08/07/21 08/05/21 0900   Date Primary Tubing Changed 08/04/21 08/04/21 2300   NEXT Primary Tubing Change  08/07/21 08/04/21 2300   Infiltration Grading ([facility], [facility] [place]) 0 08/06/21 0832   Phlebitis Scale ([facility] Only) 0 08/06/21 0832               MENTAL STATUS ON TRANSFER             CONSULTATIONS  ortho    PROCEDURES  Left femur  intramedullary device placement.     LABORATORY  Lab Results   Component Value Date    SODIUM 141 08/05/2021    POTASSIUM 4.5 08/05/2021    CHLORIDE 108 08/05/2021    CO2 23 08/05/2021    GLUCOSE 108 (H) 08/05/2021    BUN 10 08/05/2021    CREATININE 0.92 08/05/2021        Lab Results   Component Value Date    WBC 14.2 (H) 08/06/2021    HEMOGLOBIN 11.2 (L) 08/06/2021    HEMATOCRIT 37.4 (L) 08/06/2021    PLATELETCT 154 (L) 08/06/2021        Total time of the discharge process exceeds 35 minutes.

## 2021-08-06 NOTE — DISCHARGE PLANNING
[facility] Transitional Care Coordination    Referral from:  Dr Flores   Insurance Provider on Facesheet: Medicare   Potential Rehab Diagnosis: Other Ortho.      Chart review indicates patient has on going medical management and  therapy needs to possibly meet inpatient rehab facility criteria with the goal of returning to community.    D/C support:  SO who is able to assist w/ transition home.      Physiatry consultation forwarded per protocol.     Last Covid test:  N/A      Thank you for the referral.     Addendum:  Dr Christian Singh / PM[facility] reviewed chart and and indicates pt meets criteria for in pt rehab.  Updated Rocio JACKSON  - will plan to admit to rehab today . Transport pending and I will reach out to SO also.     Tc to [facility] [place] - explained in pt rehab program; she is in agreement w/ IRF vs SNF. She confirms home is ramped; pt used [facility] prior to hip fx; So works form 430am to 1100am.  She confirms she is able to assist w/ transition home.  Discussed w/ Rocio JACKSON who confirms pt is in agreement.  Discussed visitation policy at IRF due to COVID to include - pt will be Covid tested and negative result to be obtained before having a visitor; 2 visitors only during entire IRF stay; one 1 visitor @ a time,

## 2021-08-06 NOTE — FLOWSHEET NOTE
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 Yes   Have you been diagnosed with sleep apnea? Yes   Do you use a CPAP/BIPAP/AUTOPAP? No   COPD Risk Screening   Do you have a history of COPD? Yes   Do you have a Pulmonologist? No   Protocol Pathways   Protocol Pathways None

## 2021-08-06 NOTE — DISCHARGE INSTRUCTIONS
Discharge Instructions    [facility] by [facility] van with escort. Discharged via wheelchair, hospital escort: Yes.  Special equipment needed: Not Applicable    Be sure to schedule a follow-up appointment with your primary care doctor or any specialists as instructed.     Discharge Plan:        I understand that a diet low in cholesterol, fat, and sodium is recommended for good health. Unless I have been given specific instructions below for another diet, I accept this instruction as my diet prescription.   Other diet: Regular    Special Instructions: Discharge instructions for the Orthopedic Patient    Follow up with Primary Care Physician within 2 weeks of discharge to home, regarding:  Review of medications and diagnostic testing.  Surveillance for medical complications.  Workup and treatment of osteoporosis, if appropriate.     -Is this a Hip/Knee/Shoulder Joint Replacement patient? No    -Is this patient being discharged with medication to prevent blood clots?  Yes, Lovenox Enoxaparin injection  What is this medicine?  ENOXAPARIN (ee nox a PA rin) is used after knee, hip, or abdominal surgeries to prevent blood clotting. It is also used to treat existing blood clots in the lungs or in the veins.  This medicine may be used for other purposes; ask your health care provider or pharmacist if you have questions.  COMMON BRAND NAME(S): Lovenox  What should I tell my health care provider before I take this medicine?  They need to know if you have any of these conditions:  · bleeding disorders, hemorrhage, or hemophilia  · infection of the heart or heart valves  · kidney or liver disease  · previous stroke  · prosthetic heart valve  · recent surgery or delivery of a baby  · ulcer in the stomach or intestine, diverticulitis, or other bowel disease  · an unusual or allergic reaction to enoxaparin, heparin, pork or pork products, other medicines, foods, dyes, or preservatives  · pregnant or trying to  get pregnant  · breast-feeding  How should I use this medicine?  This medicine is for injection under the skin. It is usually given by a health-care professional. You or a family member may be trained on how to give the injections. If you are to give yourself injections, make sure you understand how to use the syringe, measure the dose if necessary, and give the injection. To avoid bruising, do not rub the site where this medicine has been injected. Do not take your medicine more often than directed. Do not stop taking except on the advice of your doctor or health care professional.  Make sure you receive a puncture-resistant container to dispose of the needles and syringes once you have finished with them. Do not reuse these items. Return the container to your doctor or health care professional for proper disposal.  Talk to your pediatrician regarding the use of this medicine in children. Special care may be needed.  Overdosage: If you think you have taken too much of this medicine contact a poison control center or emergency room at once.  NOTE: This medicine is only for you. Do not share this medicine with others.  What if I miss a dose?  If you miss a dose, take it as soon as you can. If it is almost time for your next dose, take only that dose. Do not take double or extra doses.  What may interact with this medicine?  · aspirin and aspirin-like medicines  · certain medicines that treat or prevent blood clots  · dipyridamole  · NSAIDs, medicines for pain and inflammation, like ibuprofen or naproxen  This list may not describe all possible interactions. Give your health care provider a list of all the medicines, herbs, non-prescription drugs, or dietary supplements you use. Also tell them if you smoke, drink alcohol, or use illegal drugs. Some items may interact with your medicine.  What should I watch for while using this medicine?  Visit your healthcare professional for regular checks on your progress. You may  need blood work done while you are taking this medicine. Your condition will be monitored carefully while you are receiving this medicine. It is important not to miss any appointments.  If you are going to need surgery or other procedure, tell your healthcare professional that you are using this medicine.  Using this medicine for a long time may weaken your bones and increase the risk of bone fractures.  Avoid sports and activities that might cause injury while you are using this medicine. Severe falls or injuries can cause unseen bleeding. Be careful when using sharp tools or knives. Consider using an electric razor. Take special care brushing or flossing your teeth. Report any injuries, bruising, or red spots on the skin to your healthcare professional.  Wear a medical ID bracelet or chain. Carry a card that describes your disease and details of your medicine and dosage times.  What side effects may I notice from receiving this medicine?  Side effects that you should report to your doctor or health care professional as soon as possible:  · allergic reactions like skin rash, itching or hives, swelling of the face, lips, or tongue  · bone pain  · signs and symptoms of bleeding such as bloody or black, tarry stools; red or dark-brown urine; spitting up blood or brown material that looks like coffee grounds; red spots on the skin; unusual bruising or bleeding from the eye, gums, or nose  · signs and symptoms of a blood clot such as chest pain; shortness of breath; pain, swelling, or warmth in the leg  · signs and symptoms of a stroke such as changes in vision; confusion; trouble speaking or understanding; severe headaches; sudden numbness or weakness of the face, arm or leg; trouble walking; dizziness; loss of coordination  Side effects that usually do not require medical attention (report to your doctor or health care professional if they continue or are bothersome):  · hair loss  · pain, redness, or irritation at  site where injected  This list may not describe all possible side effects. Call your doctor for medical advice about side effects. You may report side effects to FDA at 1-571-KON-4480.  Where should I keep my medicine?  Keep out of the reach of children.  Store at room temperature between 15 and 30 degrees C (59 and 86 degrees F). Do not freeze. If your injections have been specially prepared, you may need to store them in the refrigerator. Ask your pharmacist. Throw away any unused medicine after the expiration date.  NOTE: This sheet is a summary. It may not cover all possible information. If you have questions about this medicine, talk to your doctor, pharmacist, or health care provider.  © 2020 Elsevier/Gold Standard (2018-12-13 11:25:34)      · Is patient discharged on Warfarin / Coumadin?   No     Depression / Suicide Risk    As you are discharged from this [facility] facility, it is important to learn how to keep safe from harming yourself.    Recognize the warning signs:  · Abrupt changes in personality, positive or negative- including increase in energy   · Giving away possessions  · Change in eating patterns- significant weight changes-  positive or negative  · Change in sleeping patterns- unable to sleep or sleeping all the time   · Unwillingness or inability to communicate  · Depression  · Unusual sadness, discouragement and loneliness  · Talk of wanting to die  · Neglect of personal appearance   · Rebelliousness- reckless behavior  · Withdrawal from people/activities they love  · Confusion- inability to concentrate     If you or a loved one observes any of these behaviors or has concerns about self-harm, here's what you can do:  · Talk about it- your feelings and reasons for harming yourself  · Remove any means that you might use to hurt yourself (examples: pills, rope, extension cords, firearm)  · Get professional help from the community (Mental Health, Substance Abuse, psychological  counseling)  · Do not be alone:Call your Safe Contact- someone whom you trust who will be there for you.  · Call your local CRISIS HOTLINE 274-2169 or 112-151-4819  · Call your local Children's Mobile Crisis Response Team [place] (302) 504-3957 or www.SeroMatch  · Call the toll free National Suicide Prevention Hotlines   · National Suicide Prevention Lifeline 109-078-SNZH (0928)  · [employer] Hope Line Network 800-SUICIDE (831-8444)      Intramedullary Nailing of Hip Fracture, Care After  This sheet gives you information about how to care for yourself after your procedure. Your health care provider may also give you more specific instructions. If you have problems or questions, contact your health care provider.  What can I expect after the surgery?  After the procedure, it is common to have these symptoms in your hip area:  · Pain.  · Swelling.  · Tenderness.  · Stiffness and weakness.  Follow these instructions at home:  Medicines  · Take over-the-counter and prescription medicines only as told by your health care provider.  · Ask your health care provider if the medicine prescribed to you:  ? Requires you to avoid driving or using heavy machinery.  ? Can cause constipation. You may need to take actions to prevent or treat constipation, such as:  § Drink enough fluid to keep your urine pale yellow.  § Take over-the-counter or prescription medicines.  § Eat foods that are high in fiber, such as beans, whole grains, and fresh fruits and vegetables.  § Limit foods that are high in fat and processed sugars, such as fried or sweet foods.  Bathing  · Do not take baths, swim, or use a hot tub until your health care provider approves. Ask your health care provider if you may take showers. You may only be allowed to take sponge baths.  · Keep your bandage (dressing) dry if you shower or take a sponge bath.  Incision care    · Follow instructions from your health care provider about how to take care of your  incision. Make sure you:  ? Wash your hands with soap and water before and after you change your dressing. If soap and water are not available, use hand .  ? Change your dressing as told by your health care provider.  ? Leave stitches (sutures), skin glue, or adhesive strips in place. These skin closures may need to stay in place for 2 weeks or longer. If adhesive strip edges start to loosen and curl up, you may trim the loose edges. Do not remove adhesive strips completely unless your health care provider tells you to do that.  · Check your incision area every day for signs of infection. Check for:  ? More redness, swelling, or pain.  ? Fluid or blood.  ? Warmth.  ? Pus or a bad smell.  Managing pain, stiffness, and swelling    · If directed, put ice on the affected area.  ? Put ice in a plastic bag.  ? Place a towel between your skin and the bag.  ? Leave the ice on for 20 minutes, 2-3 times a day.  · Move your toes often to reduce stiffness and swelling.  · Raise (elevate) the injured area above the level of your heart while you are lying down.  Activity  · Rest as told by your health care provider.  · Use your crutches or walker as told by your health care provider. Your health care provider will let you know how much weight you can put on your leg. Your health care provider will do X-rays to check bone healing. As healing progresses, you may be allowed to put more weight on your leg.  · Avoid sitting for a long time without moving. Get up to take short walks every 1-2 hours. This is important to improve blood flow and breathing. Ask for help if you feel weak or unsteady.  · Keep all your physical therapy appointments.  · Do exercises as told by your health care provider. These exercises will prevent weakness and stiffness in your hip.  · Return to your normal activities as told by your health care provider. Ask your health care provider what activities are safe for you. It may take several months to  heal completely.  · Ask your health care provider when it is safe to drive.  General instructions  · Do not use any products that contain nicotine or tobacco, such as cigarettes, e-cigarettes, and chewing tobacco. These can delay bone healing after surgery. If you need help quitting, ask your health care provider.  · Take steps to prevent falls at home, such as removing throw rugs and tripping hazards.  · Keep all follow-up visits as told by your health care provider. This is important.  Contact a health care provider if:  · You are not getting relief from your pain medicine.  · You have more redness, swelling, or pain around your incision.  · You have fluid or blood coming from your incision.  · Your incision feels warm to the touch.  · You have pus or a bad smell coming from your incision.  Get help right away if:  · You have a fever and chills.  · You have chest pain or trouble breathing.  · Your incision breaks open.  · You have severe pain that does not get better with medicine.  Summary  · After your surgery, it is normal to have some pain, swelling, and tenderness.  · Take over-the-counter and prescription medicines only as told by your health care provider.  · Follow instructions from your health care provider about how to take care of your incision. Check your incision area every day for signs of infection.  · Return to your normal activities as told by your health care provider. Ask your health care provider what activities are safe for you.  · It may take several months to heal completely. Keep all follow-up visits as told by your health care provider.  This information is not intended to replace advice given to you by your health care provider. Make sure you discuss any questions you have with your health care provider.  Document Released: 10/21/2019 Document Revised: 10/21/2019 Document Reviewed: 10/21/2019  Elsevier Patient Education © 2020 [employer]vier Inc[employer]

## 2021-08-06 NOTE — CARE PLAN
The patient is Stable - Low risk of patient condition declining or worsening    Shift Goals  Clinical Goals: Pain control  Patient Goals: Pain control    Progress made toward(s) clinical / shift goals: The patient has been requesting pain medication as needed and communicating  his pain level appropriately. He has also been collaborating with case management to facilitate transfer to a rehabilitation facility.       Problem: Knowledge Deficit - Standard  Goal: Patient and family/care givers will demonstrate understanding of plan of care, disease process/condition, diagnostic tests and medications  Outcome: Progressing     Problem: Fall Risk  Goal: Patient will remain free from falls  Outcome: Progressing

## 2021-08-06 NOTE — CARE PLAN
The patient is Watcher - Medium risk of patient condition declining or worsening       Problem: Incision Care  Goal: Optimal post surgical incision care  Outcome: Not Met  Note: Dsg changed       Problem: Pain - Standard  Goal: Alleviation of pain or a reduction in pain to the patient’s comfort goal  Outcome: Not Met  Note: Pt's pain will be comtrolled with PO meds

## 2021-08-06 NOTE — PROGRESS NOTES
Report called to ARNULFO Melendrez at [facility].

## 2021-08-06 NOTE — FLOWSHEET NOTE
08/06/21 1539   Events/Summary/Plan   Events/Summary/Plan RT assessment   Vital Signs   Pulse 76   Respiration 18   Pulse Oximetry 91 %   $ Pulse Oximetry (Spot Check) Yes   Respiratory Assessment   Respiratory Pattern Within Normal Limits   Level of Consciousness Alert   Chest Exam   Work Of Breathing / Effort Within Normal Limits   Breath Sounds   RUL Breath Sounds Clear   RML Breath Sounds Clear   RLL Breath Sounds Clear   EMMIE Breath Sounds Clear   LLL Breath Sounds Clear   Secretions   Cough Non Productive   Oxygen   O2 Delivery Device None - Room Air   Smoking History   Have you ever smoked Yes   Have you smoked in the last 12 months No   Confirm Quit Date 08/06/95

## 2021-08-07 LAB
25(OH)D3 SERPL-MCNC: 28 NG/ML (ref 30–100)
ALBUMIN SERPL BCP-MCNC: 3.5 G/DL (ref 3.2–4.9)
ALBUMIN/GLOB SERPL: 1.3 G/DL
ALP SERPL-CCNC: 97 U/L (ref 30–99)
ALT SERPL-CCNC: 10 U/L (ref 2–50)
ANION GAP SERPL CALC-SCNC: 14 MMOL/L (ref 7–16)
AST SERPL-CCNC: 15 U/L (ref 12–45)
BASOPHILS # BLD AUTO: 0.5 % (ref 0–1.8)
BASOPHILS # BLD: 0.06 K/UL (ref 0–0.12)
BILIRUB SERPL-MCNC: 0.5 MG/DL (ref 0.1–1.5)
BUN SERPL-MCNC: 11 MG/DL (ref 8–22)
CALCIUM SERPL-MCNC: 8.7 MG/DL (ref 8.5–10.5)
CHLORIDE SERPL-SCNC: 104 MMOL/L (ref 96–112)
CO2 SERPL-SCNC: 22 MMOL/L (ref 20–33)
CREAT SERPL-MCNC: 0.78 MG/DL (ref 0.5–1.4)
EOSINOPHIL # BLD AUTO: 0.5 K/UL (ref 0–0.51)
EOSINOPHIL NFR BLD: 4.2 % (ref 0–6.9)
ERYTHROCYTE [DISTWIDTH] IN BLOOD BY AUTOMATED COUNT: 44.2 FL (ref 35.9–50)
EST. AVERAGE GLUCOSE BLD GHB EST-MCNC: 120 MG/DL
GLOBULIN SER CALC-MCNC: 2.8 G/DL (ref 1.9–3.5)
GLUCOSE SERPL-MCNC: 101 MG/DL (ref 65–99)
HBA1C MFR BLD: 5.8 % (ref 4–5.6)
HCT VFR BLD AUTO: 33.4 % (ref 42–52)
HGB BLD-MCNC: 10.5 G/DL (ref 14–18)
IMM GRANULOCYTES # BLD AUTO: 0.08 K/UL (ref 0–0.11)
IMM GRANULOCYTES NFR BLD AUTO: 0.7 % (ref 0–0.9)
LYMPHOCYTES # BLD AUTO: 1.69 K/UL (ref 1–4.8)
LYMPHOCYTES NFR BLD: 14.4 % (ref 22–41)
MCH RBC QN AUTO: 27.4 PG (ref 27–33)
MCHC RBC AUTO-ENTMCNC: 31.4 G/DL (ref 33.7–35.3)
MCV RBC AUTO: 87.2 FL (ref 81.4–97.8)
MONOCYTES # BLD AUTO: 0.94 K/UL (ref 0–0.85)
MONOCYTES NFR BLD AUTO: 8 % (ref 0–13.4)
NEUTROPHILS # BLD AUTO: 8.5 K/UL (ref 1.82–7.42)
NEUTROPHILS NFR BLD: 72.2 % (ref 44–72)
NRBC # BLD AUTO: 0 K/UL
NRBC BLD-RTO: 0 /100 WBC
PLATELET # BLD AUTO: 155 K/UL (ref 164–446)
PMV BLD AUTO: 11.6 FL (ref 9–12.9)
POTASSIUM SERPL-SCNC: 3.6 MMOL/L (ref 3.6–5.5)
PROT SERPL-MCNC: 6.3 G/DL (ref 6–8.2)
RBC # BLD AUTO: 3.83 M/UL (ref 4.7–6.1)
SODIUM SERPL-SCNC: 140 MMOL/L (ref 135–145)
TSH SERPL DL<=0.005 MIU/L-ACNC: 4.18 UIU/ML (ref 0.38–5.33)
WBC # BLD AUTO: 11.8 K/UL (ref 4.8–10.8)

## 2021-08-07 PROCEDURE — 80053 COMPREHEN METABOLIC PANEL: CPT

## 2021-08-07 PROCEDURE — A9270 NON-COVERED ITEM OR SERVICE: HCPCS | Performed by: PHYSICAL MEDICINE & REHABILITATION

## 2021-08-07 PROCEDURE — 97116 GAIT TRAINING THERAPY: CPT

## 2021-08-07 PROCEDURE — 97530 THERAPEUTIC ACTIVITIES: CPT

## 2021-08-07 PROCEDURE — 97167 OT EVAL HIGH COMPLEX 60 MIN: CPT

## 2021-08-07 PROCEDURE — 36415 COLL VENOUS BLD VENIPUNCTURE: CPT

## 2021-08-07 PROCEDURE — 97535 SELF CARE MNGMENT TRAINING: CPT

## 2021-08-07 PROCEDURE — 97110 THERAPEUTIC EXERCISES: CPT

## 2021-08-07 PROCEDURE — 85025 COMPLETE CBC W/AUTO DIFF WBC: CPT

## 2021-08-07 PROCEDURE — 83036 HEMOGLOBIN GLYCOSYLATED A1C: CPT

## 2021-08-07 PROCEDURE — 700102 HCHG RX REV CODE 250 W/ 637 OVERRIDE(OP): Performed by: PHYSICAL MEDICINE & REHABILITATION

## 2021-08-07 PROCEDURE — 97162 PT EVAL MOD COMPLEX 30 MIN: CPT

## 2021-08-07 PROCEDURE — 82306 VITAMIN D 25 HYDROXY: CPT

## 2021-08-07 PROCEDURE — 84443 ASSAY THYROID STIM HORMONE: CPT

## 2021-08-07 PROCEDURE — 770010 HCHG ROOM/CARE - REHAB SEMI PRIVAT*

## 2021-08-07 PROCEDURE — 99232 SBSQ HOSP IP/OBS MODERATE 35: CPT | Performed by: PHYSICAL MEDICINE & REHABILITATION

## 2021-08-07 PROCEDURE — 700111 HCHG RX REV CODE 636 W/ 250 OVERRIDE (IP): Performed by: PHYSICAL MEDICINE & REHABILITATION

## 2021-08-07 RX ORDER — LANOLIN ALCOHOL/MO/W.PET/CERES
3 CREAM (GRAM) TOPICAL
Status: DISCONTINUED | OUTPATIENT
Start: 2021-08-07 | End: 2021-08-15 | Stop reason: HOSPADM

## 2021-08-07 RX ADMIN — ATORVASTATIN CALCIUM 40 MG: 40 TABLET, FILM COATED ORAL at 19:53

## 2021-08-07 RX ADMIN — SENNOSIDES AND DOCUSATE SODIUM 2 TABLET: 8.6; 5 TABLET ORAL at 19:54

## 2021-08-07 RX ADMIN — ACETAMINOPHEN 650 MG: 325 TABLET ORAL at 15:05

## 2021-08-07 RX ADMIN — ENOXAPARIN SODIUM 40 MG: 40 INJECTION SUBCUTANEOUS at 08:17

## 2021-08-07 RX ADMIN — ACETAMINOPHEN 650 MG: 325 TABLET ORAL at 19:54

## 2021-08-07 RX ADMIN — CLOPIDOGREL BISULFATE 75 MG: 75 TABLET ORAL at 08:16

## 2021-08-07 RX ADMIN — ISOSORBIDE MONONITRATE 60 MG: 30 TABLET, EXTENDED RELEASE ORAL at 08:15

## 2021-08-07 RX ADMIN — METOPROLOL TARTRATE 50 MG: 25 TABLET, FILM COATED ORAL at 05:31

## 2021-08-07 RX ADMIN — SERTRALINE HYDROCHLORIDE 50 MG: 50 TABLET ORAL at 08:16

## 2021-08-07 RX ADMIN — TRAZODONE HYDROCHLORIDE 50 MG: 50 TABLET ORAL at 20:05

## 2021-08-07 RX ADMIN — MORPHINE SULFATE 15 MG: 15 TABLET ORAL at 05:31

## 2021-08-07 RX ADMIN — ACETAMINOPHEN 650 MG: 325 TABLET ORAL at 08:15

## 2021-08-07 RX ADMIN — MORPHINE SULFATE 15 MG: 15 TABLET ORAL at 12:27

## 2021-08-07 RX ADMIN — SERTRALINE HYDROCHLORIDE 100 MG: 50 TABLET ORAL at 19:54

## 2021-08-07 RX ADMIN — ISOSORBIDE MONONITRATE 60 MG: 30 TABLET, EXTENDED RELEASE ORAL at 19:53

## 2021-08-07 RX ADMIN — MORPHINE SULFATE 15 MG: 15 TABLET ORAL at 17:18

## 2021-08-07 RX ADMIN — CLONIDINE HYDROCHLORIDE 0.1 MG: 0.1 TABLET ORAL at 08:15

## 2021-08-07 RX ADMIN — FAMOTIDINE 40 MG: 20 TABLET ORAL at 19:55

## 2021-08-07 RX ADMIN — METOPROLOL TARTRATE 50 MG: 25 TABLET, FILM COATED ORAL at 17:18

## 2021-08-07 RX ADMIN — OXYCODONE HYDROCHLORIDE 10 MG: 10 TABLET ORAL at 19:53

## 2021-08-07 RX ADMIN — POLYVINYL ALCOHOL 1 DROP: 14 SOLUTION/ DROPS OPHTHALMIC at 05:34

## 2021-08-07 RX ADMIN — TAMSULOSIN HYDROCHLORIDE 0.4 MG: 0.4 CAPSULE ORAL at 19:53

## 2021-08-07 RX ADMIN — OMEPRAZOLE 20 MG: 20 CAPSULE, DELAYED RELEASE ORAL at 08:15

## 2021-08-07 RX ADMIN — LEVOTHYROXINE SODIUM 50 MCG: 50 TABLET ORAL at 05:31

## 2021-08-07 RX ADMIN — OXYCODONE HYDROCHLORIDE 10 MG: 10 TABLET ORAL at 07:09

## 2021-08-07 ASSESSMENT — ACTIVITIES OF DAILY LIVING (ADL)
BED_CHAIR_WHEELCHAIR_TRANSFER_DESCRIPTION: INCREASED TIME
TUB_SHOWER_TRANSFER_DESCRIPTION: INCREASED TIME;REQUIRES LIFT;SUPERVISION FOR SAFETY;VERBAL CUEING
TOILETING: INDEPENDENT

## 2021-08-07 ASSESSMENT — BRIEF INTERVIEW FOR MENTAL STATUS (BIMS)
ASKED TO RECALL SOCK: YES, NO CUE REQUIRED
WHAT DAY OF THE WEEK IS IT: CORRECT
WHAT MONTH IS IT: ACCURATE WITHIN 5 DAYS
WHAT YEAR IS IT: CORRECT
INITIAL REPETITION OF BED BLUE SOCK - FIRST ATTEMPT: 3
ASKED TO RECALL BLUE: YES, NO CUE REQUIRED
BIMS SUMMARY SCORE: 13
ASKED TO RECALL BED: NO, COULD NOT RECALL

## 2021-08-07 ASSESSMENT — GAIT ASSESSMENTS
ASSISTIVE DEVICE: FRONT WHEEL WALKER
GAIT LEVEL OF ASSIST: CONTACT GUARD ASSIST
DISTANCE (FEET): 46

## 2021-08-07 NOTE — PROGRESS NOTES
Patient care assumed. Report received from Kike kim RN. Patient is alert and calm, resting in bed. Call light and bedside table within reach. Will continue to monitor.

## 2021-08-07 NOTE — CARE PLAN
The patient is Stable - Low risk of patient condition declining or worsening    Shift Goals  Clinical Goals: pain management      Problem: Fall Risk - Rehab  Goal: Patient will remain free from falls  Outcome: Progressing:Pt uses call light consistently and appropriately. Waits for assistance does not attempt self transfer this shift. Able to verbalize needs. Will continue to monitor.      Problem: Pain - Standard  Goal: Alleviation of pain or a reduction in pain to the patient’s comfort goal  Outcome: Progressing: Pt asked for a PRN oxy 10 at the beginning of shift for pain. Pt mentioned that his eye felt dry and were hurting so he was given PRN eye drops. Will continue to monitor.

## 2021-08-07 NOTE — THERAPY
"Physical Therapy   Initial Evaluation     Patient Name: Antony Akbar  Age:  66 y.o., Sex:  male  Medical Record #: [id number]  Today's Date: 8/7/2021     Subjective    Pt reports he fell when he was letting his small dog in- the dog ran under his foot and he didn't want to \"squish\" it so he transitioned his weight and fell down the 2 steps at the backdoor. It took him 45 min to pull himself up the stairs on his arms to call for help. He resides in [facility] with SO in [facility] with ramp to enter. Moved to [place] from [place] in June.     Objective       08/07/21 0831   Prior Living Situation   Prior Services Intermittent Physical Support for ADL Per Family   Housing / Facility 1 Story House   Steps Into Home   (ramp to enter)   Steps In Home 0   Bathroom Set up Bathtub / Shower Combination;Shower Chair   Equipment Owned 4-Wheel Walker;Power Wheel Chair;Tub / Shower Seat;Hospital Bed;Ramp;Wheelchair   Lives with - Patient's Self Care Capacity Significant Other   Comments resides in [facility] with SO since June 2021. Previously residing in [place]. Receives assist by SO for bathing.   Prior Level of Functional Mobility   Bed Mobility Unable To Determine At This Time   Transfer Status Independent   Ambulation Independent   Distance Ambulation (Feet)   (~50-60' household distance with 4WW)   Assistive Devices Used 4-Wheel Walker   Wheelchair Independent   Stairs Unable To Determine At This Time   Prior Functioning: Everyday Activities   Self Care Needed some help   Indoor Mobility (Ambulation) Independent   Stairs Needed some help   Functional Cognition Independent   Prior Device Use Manual wheelchair;Motorized wheelchair or scooter;Walker   Cognition    Level of Consciousness Alert   Passive ROM Lower Body   Passive ROM Lower Body WDL   Active ROM Lower Body    Active ROM Lower Body  X   Rt Knee Flexion Degrees   (AROM limited 2/2 weakness)   Rt Knee Extension Degrees   (AROM limited 2/2 weakness)   Strength Lower Body   Rt Hip Flexion " Strength 3+ (F+)   Rt Knee Flexion Strength 2+ (P+)   Rt Knee Extension Strength 2- (P-)   Rt Ankle Dorsiflexion Strength 4 (G)   Lt Hip Flexion Strength 3- (F-)   Lt Knee Flexion Strength 3- (F-)   Lt Knee Extension Strength 3- (F-)   Lt Ankle Dorsiflexion Strength 4 (G)   Comments hx of R patellectomy ~1995.Reports being unable to extend R knee since that surgery.   Sensation Lower Body   Lt Lower Extremity Light Touch Impaired  (dermatomes L 3, L4,L, S1)   Lt Lower Extremity Proprioception Impaired  (L ankle impaired)   Comments unable to elicit L3 reflex bilaterally   Lower Body Muscle Tone   Lower Body Muscle Tone  WDL   Balance Assessment   Sitting Balance (Static) Fair +   Sitting Balance (Dynamic) Fair   Standing Balance (Static) Poor +   Standing Balance (Dynamic) Poor +   Weight Shift Sitting Fair   Weight Shift Standing Fair   Comments relies heavily on UEs during gait   Bed Mobility    Supine to Sit Stand by Assist   Sit to Supine Minimal Assist  (L LE)   Sit to Stand Minimal Assist   Roll Left and Right   Reason if not Attempted Refused to perform  (pt reports being unable to perform)   CARE Score 7   Roll Left and Right Discharge Goal   Discharge Goal 6   Sit to Lying   Assistance Needed Physical assistance   Physical Assistance Level 25% or less   CARE Score 3   Sit to Lying Discharge Goal   Discharge Goal 6   Lying to Sitting on Side of Bed   Assistance Needed Supervision   CARE Score 4   Lying to Sitting on Side of Bed Discharge Goal   Discharge Goal 6   Sit to Stand   Assistance Needed Physical assistance   Physical Assistance Level 25% or less   CARE Score 3   Sit to Stand Discharge Goal   Discharge Goal 6   Chair/Bed-to-Chair Transfer   Assistance Needed Physical assistance   Physical Assistance Level 25% or less   CARE Score 3   Chair/Bed-to-Chair Transfer Discharge Goal   Discharge Goal 6   Car Transfer   Reason if not Attempted Environmental limitations  (admit iso prec)   CARE Score 10   Car  Transfer Discharge Goal   Discharge Goal 4   Walk 10 Feet   Assistance Needed Physical assistance   Physical Assistance Level 25% or less   CARE Score 3   Walk 10 Feet Discharge Goal   Discharge Goal 6   Walk 50 Feet with Two Turns   Reason if not Attempted Safety concerns  (pt requires seated rest breaks at 46')   CARE Score 88   Walk 50 Feet with Two Turns Discharge Goal   Discharge Goal 6   Walk 150 Feet   Reason if not Attempted Activity not applicable  (pt reports being unable to amb >50-60' at baseline)   CARE Score 9   Walk 150 Feet Discharge Goal   Discharge Goal 9   Walking 10 Feet on Uneven Surfaces   Reason if not Attempted Environmental limitations  (admit iso prec, treatment in pt's room)   CARE Score 10   Walking 10 Feet on Uneven Surfaces Discharge Goal   Discharge Goal 4   1 Step (Curb)   Reason if not Attempted Activity not applicable  (pt reports using a wc in the community)   CARE Score 9   1 Step (Curb) Discharge Goal   Discharge Goal 9   4 Steps   Reason if not Attempted Activity not applicable   CARE Score 9   4 Steps Discharge Goal   Discharge Goal 9   12 Steps   Reason if not Attempted Activity not applicable   CARE Score 9   12 Steps Discharge Goal   Discharge Goal 9   Picking Up Object   Reason if not Attempted Safety concerns   CARE Score 88   Picking Up Object Discharge Goal   Discharge Goal 4   Wheel 50 Feet with Two Turns   Reason if not Attempted Environmental limitations   CARE Score 10   Wheel 50 Feet with Two Turns Discharge Goal   Discharge Goal 6   Wheel 150 Feet   Reason if not Attempted Environmental limitations   CARE Score 10   Wheel 150 Feet Discharge Goal   Discharge Goal 6   Gait Functional Level of Assist    Gait Level Of Assist Contact Guard Assist   Assistive Device Front Wheel Walker   Distance (Feet) 46   # of Times Distance was Traveled 3   Deviation   (inc time in DLS, dec steff and gait speed)   Transfer Functional Level of Assist   Bed, Chair, Wheelchair Transfer  "Minimal Assist   Bed Chair Wheelchair Transfer Description Assist with one limb;Increased time;Initial preparation for task;Set-up of equipment;Verbal cueing   Problem List    Problems Pain;Impaired Bed Mobility;Impaired Transfers;Functional Strength Deficit;Impaired Balance;Decreased Activity Tolerance   Interdisciplinary Plan of Care Collaboration   Patient Position at End of Therapy In Bed;Call Light within Reach;Tray Table within Reach;Phone within Reach   Benefit   Therapy Benefit Patient Would Benefit from Inpatient Rehabilitation Physical Therapy to Maximize Functional [place] with ADLs, IADLs and Mobility.   Strengths & Barriers   Strengths Able to follow instructions;Effective communication skills;Good insight into deficits/needs;Pleasant and cooperative;Supportive family;Willingly participates in therapeutic activities   Barriers Decreased endurance;Generalized weakness;Impaired activity tolerance;Impaired balance   PT Total Time Spent   PT Individual Total Time Spent (Mins) 90   PT Charge Group   PT Gait Training 2   PT Therapeutic Exercise 2   PT Evaluation PT Evaluation Mod     There ex:    Standing: mini squats x10 reps, alt marching steps x30 reps, heel raises x30 reps.   Supine: R ham sets x10 reps, L heel slides x10 reps, Quad and glut sets 6\" hold x10 reps. Attempted  bridges however pt unable to perform through full ROM.     Treatment times 3568-0873 and [id number].    Assessment  Patient is 66 y.o. male with a diagnosis of fall down 2 steps with resultant L femur fx s/p L intramedullary device by Dr. Ríos on 8/4/21. Precautions include WBAT L LE, Pacemaker, high fall risk.  Additional factors influencing patient status / progress (ie: cognitive factors, co-morbidities, social support, etc): complex medical hx including multiple lumbar and cervical fractures, limited mobility at baseline, was on hospice x4 years and no longer on hospice. He does have strong support by SO however she works " and he will be alone from 9-11 am.       Plan  Recommend Physical Therapy  minutes 1-2x per day 5-7 days per week for 10 days for the following treatments:  PT Group Therapy, PT Gait Training, PT Self Care/Home Eval, PT Therapeutic Exercises, PT Neuro Re-Ed/Balance, PT Therapeutic Activity and PT Evaluation.    Passport items to be completed:  Passport items to be completed:  Get in/out of bed safely, in/out of a vehicle, safely use mobility device, walk or wheel around home/community, navigate up and down stairs, show how to get up/down from the ground, ensure home is accessible, demonstrate HEP, complete caregiver training    Goals:  Long term and short term goals have been discussed with patient and he is in agreement.    Physical Therapy Problems (Active)     Problem: Mobility     Dates: Start: 08/07/21       Goal: STG-Within one week, patient will ambulate household distance     Dates: Start: 08/07/21       Goal Note filed on 08/07/21 1608 by Teri Vega DPT     1) Individualized goal:   >50' with FWW and SBA  2) Interventions:  PT Group Therapy, PT Gait Training, PT Self Care/Home Eval, PT Therapeutic Exercises, PT Neuro Re-Ed/Balance, PT Therapeutic Activity, and PT Evaluation               Problem: Mobility Transfers     Dates: Start: 08/07/21       Goal: STG-Within one week, patient will perform bed mobility     Dates: Start: 08/07/21       Goal Note filed on 08/07/21 1608 by Teri Vega DPT     1) Individualized goal:   SPV on hospital bed with use of bedcontrol as pt has a hospital bed at home  2) Interventions: PT Group Therapy, PT Gait Training, PT Self Care/Home Eval, PT Therapeutic Exercises, PT Neuro Re-Ed/Balance, PT Therapeutic Activity, and PT Evaluation            Goal: STG-Within one week, patient will transfer bed to chair     Dates: Start: 08/07/21       Goal Note filed on 08/07/21 1608 by Teri Vega DPT     1) Individualized goal:   SPV with FWW or 4WW  2)  Interventions: PT Group Therapy, PT Gait Training, PT Self Care/Home Eval, PT Therapeutic Exercises, PT Neuro Re-Ed/Balance, PT Therapeutic Activity, and PT Evaluation               Problem: PT-Long Term Goals     Dates: Start: 08/07/21       Goal: LTG-By discharge, patient will ambulate     Dates: Start: 08/07/21       Goal Note filed on 08/07/21 1608 by Teri Vega DPT     1) Individualized goal:   >55' with FWW or 4WW and Mod I to safely navigate his home environment  2) Interventions: PT Group Therapy, PT Gait Training, PT Self Care/Home Eval, PT Therapeutic Exercises, PT Neuro Re-Ed/Balance, PT Therapeutic Activity, and PT Evaluation            Goal: LTG-By discharge, patient will transfer one surface to another     Dates: Start: 08/07/21       Goal Note filed on 08/07/21 1608 by Teri Vega DPT     1) Individualized goal:   Mod I with FWW or 4WW  2) Interventions:  PT Group Therapy, PT Gait Training, PT Self Care/Home Eval, PT Therapeutic Exercises, PT Neuro Re-Ed/Balance, PT Therapeutic Activity, and PT Evaluation            Goal: LTG-By discharge, patient will transfer in/out of a car     Dates: Start: 08/07/21       Goal Note filed on 08/07/21 1608 by Teri Vega DPT     1) Individualized goal:   SPV from ambulatory level with FWW or 4WW  2) Interventions:  PT Group Therapy, PT Gait Training, PT Self Care/Home Eval, PT Therapeutic Exercises, PT Neuro Re-Ed/Balance, PT Therapeutic Activity, and PT Evaluation            Goal: LTG-By discharge, patient will     Dates: Start: 08/07/21       Goal Note filed on 08/07/21 1608 by Teri Vega DPT     1) Individualized goal:   ascend/descend an ADA ramp with 4WW or FWW and SPV to safely enter/exit his home  2) Interventions:  PT Group Therapy, PT Gait Training, PT Self Care/Home Eval, PT Therapeutic Exercises, PT Neuro Re-Ed/Balance, PT Therapeutic Activity, and PT Evaluation

## 2021-08-07 NOTE — PROGRESS NOTES
"Rehab Progress Note     Encounter date: 8/7/21      Chief Complaint: left hip fracture     Interval Events (Subjective)  Patient reports he had a hard time sleeping last night. Discussed option to add melatonin to trazodone. Denies any increased lightheadedness or dizziness. Reports improving hip pain, most noticeable when he mobilizes. Denies CP, SOB, nausea or LE swelling. Agreeable to trial of melatonin at night.     Objective:  VITAL SIGNS: /67   Pulse 76   Temp 36.8 °C (98.3 °F) (Temporal)   Resp 17   Ht 1.707 m (5' 7.2\")   Wt 86.5 kg (190 lb 11.2 oz)   SpO2 95%   BMI 29.69 kg/m²     Physical Exam:  Vitals: /67   Pulse 76   Temp 36.8 °C (98.3 °F) (Temporal)   Resp 17   Ht 1.707 m (5' 7.2\")   Wt 86.5 kg (190 lb 11.2 oz)   SpO2 95%   Gen: NAD, laying comfortably in bed, setting up to each lunch   Head:  NC/AT   Eyes/ Nose/ Mouth: PERRLA, moist mucous membranes  Cardio: RRR, good distal perfusion, warm extremities  Pulm: normal respiratory effort, no cyanosis, no witnessed wheezes or coughing   Abd: Soft NTND,   Extremities : 5/5 b/l DF, PF, has no patella on R knee., no lower extremity swelling     Recent Results (from the past 72 hour(s))   CBC WITH DIFFERENTIAL    Collection Time: 08/04/21  4:42 PM   Result Value Ref Range    WBC 20.3 (H) 4.8 - 10.8 K/uL    RBC 4.71 4.70 - 6.10 M/uL    Hemoglobin 13.1 (L) 14.0 - 18.0 g/dL    Hematocrit 40.9 (L) 42.0 - 52.0 %    MCV 86.8 81.4 - 97.8 fL    MCH 27.8 27.0 - 33.0 pg    MCHC 32.0 (L) 33.7 - 35.3 g/dL    RDW 44.3 35.9 - 50.0 fL    Platelet Count 195 164 - 446 K/uL    MPV 11.1 9.0 - 12.9 fL    Neutrophils-Polys 90.30 (H) 44.00 - 72.00 %    Lymphocytes 3.60 (L) 22.00 - 41.00 %    Monocytes 5.00 0.00 - 13.40 %    Eosinophils 0.00 0.00 - 6.90 %    Basophils 0.20 0.00 - 1.80 %    Immature Granulocytes 0.90 0.00 - 0.90 %    Nucleated RBC 0.00 /100 WBC    Neutrophils (Absolute) 18.29 (H) 1.82 - 7.42 K/uL    Lymphs (Absolute) 0.73 (L) 1.00 - 4.80 K/uL "    Monos (Absolute) 1.02 (H) 0.00 - 0.85 K/uL    Eos (Absolute) 0.01 0.00 - 0.51 K/uL    Baso (Absolute) 0.04 0.00 - 0.12 K/uL    Immature Granulocytes (abs) 0.19 (H) 0.00 - 0.11 K/uL    NRBC (Absolute) 0.00 K/uL   CMP    Collection Time: 21  4:42 PM   Result Value Ref Range    Sodium 143 135 - 145 mmol/L    Potassium 4.3 3.6 - 5.5 mmol/L    Chloride 110 96 - 112 mmol/L    Co2 20 20 - 33 mmol/L    Anion Gap 13.0 7.0 - 16.0    Glucose 110 (H) 65 - 99 mg/dL    Bun 8 8 - 22 mg/dL    Creatinine 0.97 0.50 - 1.40 mg/dL    Calcium 8.9 8.5 - 10.5 mg/dL    AST(SGOT) 18 12 - 45 U/L    ALT(SGPT) 13 2 - 50 U/L    Alkaline Phosphatase 137 (H) 30 - 99 U/L    Total Bilirubin 0.4 0.1 - 1.5 mg/dL    Albumin 4.1 3.2 - 4.9 g/dL    Total Protein 6.8 6.0 - 8.2 g/dL    Globulin 2.7 1.9 - 3.5 g/dL    A-G Ratio 1.5 g/dL   ESTIMATED GFR    Collection Time: 21  4:42 PM   Result Value Ref Range    GFR If African American >60 >60 mL/min/1.73 m 2    GFR If Non African American >60 >60 mL/min/1.73 m 2   EKG    Collection Time: 21  5:28 PM   Result Value Ref Range    Report       [facility]    Test Date:  2021  Pt Name:    MARCOS LOGAN                 Department: ER  MRN:        [id number]                      Room:       BL 18  Gender:     Male                         Technician: [id number]  :        1955                   Requested By:BRIANA BECKMAN  Order #:    [id number]                    Reading MD:    Measurements  Intervals                                Axis  Rate:       79                           P:          84  DE:         201                          QRS:        34  QRSD:       119                          T:          49  QT:         418  QTc:        481    Interpretive Statements  Sinus rhythm  Incomplete right bundle branch block  Probable inferior infarct, old  No previous ECG available for comparison     CBC with Differential    Collection Time: 21  6:28  AM   Result Value Ref Range    WBC 14.8 (H) 4.8 - 10.8 K/uL    RBC 4.12 (L) 4.70 - 6.10 M/uL    Hemoglobin 11.3 (L) 14.0 - 18.0 g/dL    Hematocrit 36.1 (L) 42.0 - 52.0 %    MCV 87.6 81.4 - 97.8 fL    MCH 27.4 27.0 - 33.0 pg    MCHC 31.3 (L) 33.7 - 35.3 g/dL    RDW 43.9 35.9 - 50.0 fL    Platelet Count 146 (L) 164 - 446 K/uL    MPV 10.6 9.0 - 12.9 fL    Neutrophils-Polys 85.60 (H) 44.00 - 72.00 %    Lymphocytes 6.60 (L) 22.00 - 41.00 %    Monocytes 7.00 0.00 - 13.40 %    Eosinophils 0.00 0.00 - 6.90 %    Basophils 0.30 0.00 - 1.80 %    Immature Granulocytes 0.50 0.00 - 0.90 %    Nucleated RBC 0.00 /100 WBC    Neutrophils (Absolute) 12.67 (H) 1.82 - 7.42 K/uL    Lymphs (Absolute) 0.98 (L) 1.00 - 4.80 K/uL    Monos (Absolute) 1.03 (H) 0.00 - 0.85 K/uL    Eos (Absolute) 0.00 0.00 - 0.51 K/uL    Baso (Absolute) 0.04 0.00 - 0.12 K/uL    Immature Granulocytes (abs) 0.08 0.00 - 0.11 K/uL    NRBC (Absolute) 0.00 K/uL   Comp Metabolic Panel (CMP)    Collection Time: 08/05/21  6:28 AM   Result Value Ref Range    Sodium 141 135 - 145 mmol/L    Potassium 4.5 3.6 - 5.5 mmol/L    Chloride 108 96 - 112 mmol/L    Co2 23 20 - 33 mmol/L    Anion Gap 10.0 7.0 - 16.0    Glucose 108 (H) 65 - 99 mg/dL    Bun 10 8 - 22 mg/dL    Creatinine 0.92 0.50 - 1.40 mg/dL    Calcium 8.5 8.5 - 10.5 mg/dL    AST(SGOT) 19 12 - 45 U/L    ALT(SGPT) 12 2 - 50 U/L    Alkaline Phosphatase 112 (H) 30 - 99 U/L    Total Bilirubin 0.4 0.1 - 1.5 mg/dL    Albumin 3.4 3.2 - 4.9 g/dL    Total Protein 6.1 6.0 - 8.2 g/dL    Globulin 2.7 1.9 - 3.5 g/dL    A-G Ratio 1.3 g/dL   ESTIMATED GFR    Collection Time: 08/05/21  6:28 AM   Result Value Ref Range    GFR If African American >60 >60 mL/min/1.73 m 2    GFR If Non African American >60 >60 mL/min/1.73 m 2   CBC WITHOUT DIFFERENTIAL    Collection Time: 08/06/21  7:28 AM   Result Value Ref Range    WBC 14.2 (H) 4.8 - 10.8 K/uL    RBC 4.13 (L) 4.70 - 6.10 M/uL    Hemoglobin 11.2 (L) 14.0 - 18.0 g/dL    Hematocrit 37.4 (L)  42.0 - 52.0 %    MCV 90.6 81.4 - 97.8 fL    MCH 27.1 27.0 - 33.0 pg    MCHC 29.9 (L) 33.7 - 35.3 g/dL    RDW 45.7 35.9 - 50.0 fL    Platelet Count 154 (L) 164 - 446 K/uL    MPV 11.1 9.0 - 12.9 fL   PERIPHERAL SMEAR REVIEW    Collection Time: 08/06/21  7:28 AM   Result Value Ref Range    Peripheral Smear Review see below    SARS-CoV-2 PCR (24 hour In-House): Collect NP swab in [facility]    Collection Time: 08/06/21  1:59 PM    Specimen: Nasopharyngeal; Respirate   Result Value Ref Range    SARS-CoV-2 Source NP Swab    CBC with Differential    Collection Time: 08/07/21  5:23 AM   Result Value Ref Range    WBC 11.8 (H) 4.8 - 10.8 K/uL    RBC 3.83 (L) 4.70 - 6.10 M/uL    Hemoglobin 10.5 (L) 14.0 - 18.0 g/dL    Hematocrit 33.4 (L) 42.0 - 52.0 %    MCV 87.2 81.4 - 97.8 fL    MCH 27.4 27.0 - 33.0 pg    MCHC 31.4 (L) 33.7 - 35.3 g/dL    RDW 44.2 35.9 - 50.0 fL    Platelet Count 155 (L) 164 - 446 K/uL    MPV 11.6 9.0 - 12.9 fL    Neutrophils-Polys 72.20 (H) 44.00 - 72.00 %    Lymphocytes 14.40 (L) 22.00 - 41.00 %    Monocytes 8.00 0.00 - 13.40 %    Eosinophils 4.20 0.00 - 6.90 %    Basophils 0.50 0.00 - 1.80 %    Immature Granulocytes 0.70 0.00 - 0.90 %    Nucleated RBC 0.00 /100 WBC    Neutrophils (Absolute) 8.50 (H) 1.82 - 7.42 K/uL    Lymphs (Absolute) 1.69 1.00 - 4.80 K/uL    Monos (Absolute) 0.94 (H) 0.00 - 0.85 K/uL    Eos (Absolute) 0.50 0.00 - 0.51 K/uL    Baso (Absolute) 0.06 0.00 - 0.12 K/uL    Immature Granulocytes (abs) 0.08 0.00 - 0.11 K/uL    NRBC (Absolute) 0.00 K/uL   Comp Metabolic Panel (CMP)    Collection Time: 08/07/21  5:23 AM   Result Value Ref Range    Sodium 140 135 - 145 mmol/L    Potassium 3.6 3.6 - 5.5 mmol/L    Chloride 104 96 - 112 mmol/L    Co2 22 20 - 33 mmol/L    Anion Gap 14.0 7.0 - 16.0    Glucose 101 (H) 65 - 99 mg/dL    Bun 11 8 - 22 mg/dL    Creatinine 0.78 0.50 - 1.40 mg/dL    Calcium 8.7 8.5 - 10.5 mg/dL    AST(SGOT) 15 12 - 45 U/L    ALT(SGPT) 10 2 - 50 U/L    Alkaline Phosphatase 97 30 - 99  U/L    Total Bilirubin 0.5 0.1 - 1.5 mg/dL    Albumin 3.5 3.2 - 4.9 g/dL    Total Protein 6.3 6.0 - 8.2 g/dL    Globulin 2.8 1.9 - 3.5 g/dL    A-G Ratio 1.3 g/dL   HEMOGLOBIN A1C    Collection Time: 08/07/21  5:23 AM   Result Value Ref Range    Glycohemoglobin 5.8 (H) 4.0 - 5.6 %    Est Avg Glucose 120 mg/dL   TSH with Reflex to FT4    Collection Time: 08/07/21  5:23 AM   Result Value Ref Range    TSH 4.180 0.380 - 5.330 uIU/mL   Vitamin D, 25-hydroxy (blood)    Collection Time: 08/07/21  5:23 AM   Result Value Ref Range    25-Hydroxy   Vitamin D 25 28 (L) 30 - 100 ng/mL   ESTIMATED GFR    Collection Time: 08/07/21  5:23 AM   Result Value Ref Range    GFR If African American >60 >60 mL/min/1.73 m 2    GFR If Non African American >60 >60 mL/min/1.73 m 2       Current Facility-Administered Medications   Medication Frequency   • Respiratory Therapy Consult Continuous RT   • Pharmacy Consult Request ...Pain Management Review 1 Each PHARMACY TO DOSE   • oxyCODONE immediate-release (ROXICODONE) tablet 5 mg Q3HRS PRN    Or   • oxyCODONE immediate release (ROXICODONE) tablet 10 mg Q3HRS PRN   • hydrALAZINE (APRESOLINE) tablet 25 mg Q8HRS PRN   • acetaminophen (Tylenol) tablet 650 mg Q4HRS PRN   • senna-docusate (PERICOLACE or SENOKOT S) 8.6-50 MG per tablet 2 tablet BID    And   • polyethylene glycol/lytes (MIRALAX) PACKET 1 Packet QDAY PRN    And   • magnesium hydroxide (MILK OF MAGNESIA) suspension 30 mL QDAY PRN    And   • bisacodyl (DULCOLAX) suppository 10 mg QDAY PRN   • artificial tears ophthalmic solution 1 Drop PRN   • benzocaine-menthol (CEPACOL) lozenge 1 Lozenge Q2HRS PRN   • mag hydrox-al hydrox-simeth (MAALOX PLUS ES or MYLANTA DS) suspension 20 mL Q2HRS PRN   • ondansetron (ZOFRAN ODT) dispertab 4 mg 4X/DAY PRN    Or   • ondansetron (ZOFRAN) syringe/vial injection 4 mg 4X/DAY PRN   • traZODone (DESYREL) tablet 50 mg QHS PRN   • sodium chloride (OCEAN) 0.65 % nasal spray 2 Spray PRN   • acetaminophen (Tylenol)  tablet 650 mg TID   • albuterol inhaler 2 Puff Q6HRS PRN   • atorvastatin (LIPITOR) tablet 40 mg Nightly   • butalbital/apap/caffeine -40 mg (Fioricet) per tablet 1 tablet Q4HRS PRN   • cloNIDine (CATAPRES) tablet 0.1 mg QAM   • clopidogrel (PLAVIX) tablet 75 mg DAILY   • enoxaparin (LOVENOX) inj 40 mg DAILY   • famotidine (PEPCID) tablet 40 mg Q EVENING   • isosorbide mononitrate SR (IMDUR) tablet 60 mg BID   • [START ON 8/9/2021] levothyroxine (SYNTHROID) tablet 25 mcg Once per day on Mon Tue Wed Thu Fri   • levothyroxine (SYNTHROID) tablet 50 mcg Once per day on Sun Sat   • metoprolol tartrate (LOPRESSOR) tablet 50 mg BID   • omeprazole (PRILOSEC) capsule 20 mg QAM   • sertraline (Zoloft) tablet 100 mg Q EVENING   • sertraline (Zoloft) tablet 50 mg QAM   • tamsulosin (FLOMAX) capsule 0.4 mg Q EVENING   • morphine (MS IR) tablet 15 mg Q6HRS       Orders Placed This Encounter   Procedures   • Diet Order Diet: Regular     Standing Status:   Standing     Number of Occurrences:   1     Order Specific Question:   Diet:     Answer:   Regular [1]       Assessment:  Active Hospital Problems    Diagnosis    • *Closed left hip fracture (HCC)    • CAD (coronary artery disease) of artery bypass graft    • COPD (chronic obstructive pulmonary disease) (HCC)    • Essential hypertension    • GERD (gastroesophageal reflux disease)    • Hypothyroidism    • Leukocytosis        Medical Decision Making and Plan:  Left hip fracture - GLF on 8/4/21 due to dog found to have left hip fracture. Patient is now s/p intramedullary device with Dr. Ríos on 8/4/21. WBAT. Has some numbness to left leg, will monitor.   -PT and OT for mobility and ADLs  -Follow-up Orthopedic surgery.      Post-op/Back pain - Patient reports having pain contract for Morphine 15 mg q6h. Will continue  - PRN Tylenol     CAD/HTN - Patient on Clonidine 0.1 mg daily, Imdur 60 mg BID, Metoprolol 50 mg BID.  Patient on Plavix 75 mg daily. Will monitor      HLD -  Patient on Atorvastatin 40 mg QHS     COPD - Not on medications. Will monitor      Anemia   -ABLA, Hgb 11.2 -> 10.5  - asymptomatic, will recheck CBC on Sunday      Leukocytosis - May be reactionary, improved from 14 -> 11 as of 8/7      Hypothyroidism - Patient on Levothyroxine 25 mcg M-F and 50 mcg Sun-Sat     Depression/Sleep - Patient on Zoloft 50/100 mg   - adding melatonin PRN, did not sleep well in trazadone only      BPH - Patient on Flomax on transfer.      DVT Ppx - Patient has been cleared for Lovenox.        Total time:  24 minutes.  I spent greater than 50% of the time for patient care and coordination on this date, including unit/floor time, and face-to-face time with the patient as per assessment and plan above.    Nurys Zaldivar D.O.     Physical Medicine and Rehabilitation

## 2021-08-07 NOTE — THERAPY
Occupational Therapy  Daily Treatment     Patient Name: Antony Akbar  Age:  66 y.o., Sex:  male  Medical Record #: [id number]  Today's Date: 8/7/2021     Precautions  Precautions: (P) Fall Risk, Weight Bearing As Tolerated Left Lower Extremity  Comments: hx of extensive R knee surgical intervention         Subjective    Patient agreeable to AE education and modified dressing technique education/blocked practice.      Objective       08/07/21 1401   Precautions   Precautions Fall Risk;Weight Bearing As Tolerated Left Lower Extremity   Pain 0 - 10 Group   Location Back   Pain Rating Scale (NPRS) 8   Therapist Pain Assessment Prior to Activity   Cognition    Cognition / Consciousness WDL   Level of Consciousness Alert   ABS (Agitated Behavior Scale)   Agitated Behavior Scale Performed No   Sleep/Wake Cycle   Sleep & Rest Awake   Functional Level of Assist   Lower Body Dressing Contact Guard Assist   Lower Body Dressing Description Reacher;Sock aid;Initial preparation for task;Supervision for safety  (Footwear only )   Bed, Chair, Wheelchair Transfer Contact Guard Assist   Bed Chair Wheelchair Transfer Description Increased time  (HOB elevated )   Balance   Sitting Balance (Static) Fair +   Sitting Balance (Dynamic) Fair +   Standing Balance (Static) Poor +   Weight Shift Sitting Fair   Bed Mobility    Supine to Sit Stand by Assist   Sit to Supine Stand by Assist   Interdisciplinary Plan of Care Collaboration   IDT Collaboration with  Nursing   Patient Position at End of Therapy In Bed;Call Light within Reach;Tray Table within Reach;Phone within Reach   Collaboration Comments Re: COVID Test inquiry    OT Total Time Spent   OT Individual Total Time Spent (Mins) 30   OT Charge Group   Charges Yes   OT Therapy Activity 2       Assessment    Despite complaint of increased back pain, patient demonstrates increased independence with bed mobility and adaptive dressing techniques.  Reacher and sock aid presented to decrease  dependence on caregivers for LB dressing.  With single visual presentation, patient able to don/doff footwear (socks only) at a SBA level with appropriate use of AE.  Bed mobility completed at a SBA level with hooked leg technique to lift/lower LLE.  Patient able to put away clothing with set-up from therapist at a SUP level with use of FWW for single stand/sit transition from EOB (CGA provided).        Strengths: Able to follow instructions, Alert and oriented, Effective communication skills, Good insight into deficits/needs, Manages pain appropriately, Motivated for self care and independence, Pleasant and cooperative, Supportive family, Willingly participates in therapeutic activities  Barriers: Decreased endurance, Generalized weakness, Limited mobility, Pain    Plan    Continue OT POC with focus on increased standing tolerance, functional mobility/transfers with ADL pursuits, modified ADLs, light IADLs (laundry/simple meal prep), and strengthening to maximize safety and independence with ADL/IADL pursuits.     Passport items to be completed:  Perform bathroom transfers, complete dressing, complete feeding, get ready for the day, prepare a simple meal, participate in household tasks, adapt home for safety needs, demonstrate home exercise program, complete caregiver training     Occupational Therapy Goals (Active)     Problem: Dressing     Dates: Start: 08/07/21       Goal: STG-Within one week, patient will dress LB     Dates: Start: 08/07/21       Description: 1) Individualized Goal:  at a CGA with AE PRN.             Problem: Functional Transfers     Dates: Start: 08/07/21       Goal: STG-Within one week, patient will transfer to toilet     Dates: Start: 08/07/21       Description: 1) Individualized Goal: at a CGA level with use of AD/AE PRN.        Goal: STG-Within one week, patient will transfer to tub/shower     Dates: Start: 08/07/21       Description: 1) Individualized Goal:  with tub shower bench at a CGA  level with AE/AD PRN.           Problem: OT Long Term Goals     Dates: Start: 08/07/21       Goal: LTG-By discharge, patient will complete basic self care tasks     Dates: Start: 08/07/21       Description: 1) Individualized Goal:  at a Min A to Mod I level with use of AE/AD PRN.            Goal: LTG-By discharge, patient will perform bathroom transfers     Dates: Start: 08/07/21       Description: 1) Individualized Goal:  at a Min A to Mod I level with AD/ DME PRN.            Problem: Toileting     Dates: Start: 08/07/21       Goal: STG-Within one week, patient will complete toileting tasks     Dates: Start: 08/07/21       Description: 1) Individualized Goal:  at a SBA level with AE/AD PRN.

## 2021-08-07 NOTE — THERAPY
"Occupational Therapy   Initial Evaluation     Patient Name: Antony Akbar  Age:  66 y.o., Sex:  male  Medical Record #: [id number]  Today's Date: 8/7/2021     Subjective    \"I was on hospice for 5 years before they kicked me off.\"  (extensive medical hx)      Objective       08/07/21 0701   Prior Living Situation   Prior Services Intermittent Physical Support for ADL Per Family   Housing / Facility 1 Story House   Steps Into Home   (Ramp to enter)   Steps In Home 0   Bathroom Set up Bathtub / Shower Combination;Shower Chair   Equipment Owned 4-Wheel Walker;Front-Wheel Walker;Hospital Bed;Power Wheel Chair;Wheelchair   Lives with - Patient's Self Care Capacity Significant Other   Prior Level of ADL Function   Self Feeding Independent   Grooming / Hygiene Independent   Bathing Requires Assist   Dressing Requires Assist   Toileting Independent   Comments Patient reports that his spouse typically provides some support with dressing and bathing dependent on pain.    Prior Level of IADL Function   Medication Management Independent   Laundry Requires Assist   Kitchen Mobility Independent   Finances Independent   Home Management Requires Assist   Shopping Requires Assist   Prior Level Of Mobility Independent With Device in Home;Uses Wheel Chair for Community Mobility   Driving / Transportation Relatives / Others Provide Transportation   Occupation (Pre-Hospital Vocational) Retired Due To Disability   Leisure Interests Reading;Pets   Prior Functioning: Everyday Activities   Self Care Needed some help   Indoor Mobility (Ambulation) Independent   Stairs Needed some help   Functional Cognition Independent   Prior Device Use Walker;Manual wheelchair;Motorized wheelchair or scooter   Pain 0 - 10 Group   Location Hip   Location Orientation Left   Pain Rating Scale (NPRS) 6   Description Aching   Therapist Pain Assessment During Activity   Cognition    Cognition / Consciousness WDL   Level of Consciousness Alert   Comments Pt notes STM " "deficits secondary to hx of stroke    ABS (Agitated Behavior Scale)   Agitated Behavior Scale Performed No   Cognitive Pattern Assessment   Cognitive Pattern Assessment Used BIMS   Brief Interview for Mental Status (BIMS)   Repetition of Three Words (First Attempt) 3   Temporal Orientation: Year Correct   Temporal Orientation: Month Accurate within 5 days   Temporal Orientation: Day Correct   Recall: \"Sock\" Yes, no cue required   Recall: \"Blue\" Yes, no cue required   Recall: \"Bed\" No, could not recall   BIMS Summary Score 13   Active ROM Upper Body   Active ROM Upper Body  X   Dominant Hand Right   Rt Shoulder Flexion Degrees 85   Rt Shoulder Abduction Degrees 80   Lt Shoulder Flexion Degrees 90   Lt. Shoulder Abduction Degrees 85   Gross Active ROM Gross Active Range of Motion Impaired, but Appears Adequate for Functional Activities.   Comments Visual Estimate    Strength Upper Body   Upper Body Strength  X   Gross Strength Generalized Weakness, Equal Bilaterally.    Comments hx of bilateral shoulder surgery    Balance Assessment   Sitting Balance (Static) Fair   Sitting Balance (Dynamic) Fair   Standing Balance (Static) Poor -   Standing Balance (Dynamic) Trace +   Weight Shift Sitting Fair   Weight Shift Standing Poor   Bed Mobility    Supine to Sit Contact Guard Assist  (HOB elevated; pt has hospital bed+trapeze at home)   Sit to Stand Moderate Assist   Coordination Upper Body   Coordination WDL   Eating   Assistance Needed Set-up / clean-up   Physical Assistance Level No physical assistance   CARE Score 5   Eating Discharge Goal   Discharge Goal 6   Oral Hygiene   Assistance Needed Set-up / clean-up   Physical Assistance Level No physical assistance   CARE Score 5   Oral Hygiene Discharge Goal   Discharge Goal 6   Shower/Bathe Self   Assistance Needed Physical assistance   Physical Assistance Level 25% or less   CARE Score 3   Shower/Bathe Self Discharge Goal   Discharge Goal 6   Upper Body Dressing "   Assistance Needed Physical assistance   Physical Assistance Level 25% or less   CARE Score 3   Upper Body Dressing Discharge Goal   Discharge Goal 6   Lower Body Dressing   Assistance Needed Physical assistance   Physical Assistance Level 51%-75%   CARE Score 2   Lower Body Dressing Discharge Goal   Discharge Goal 6   Putting On/Taking Off Footwear   Assistance Needed Physical assistance   Physical Assistance Level 51%-75%   CARE Score 2   Putting On/Taking Off Footwear Discharge Goal   Discharge Goal 4   Toileting Hygiene   Assistance Needed Supervision   CARE Score 4   Toileting Hygiene Discharge Goal   Discharge Goal 6   Toilet Transfer   Assistance Needed Physical assistance   Physical Assistance Level 26%-50%   CARE Score 3   Toilet Transfer Discharge Goal   Discharge Goal 6   Hearing, Speech, and Vision   Expression of Ideas and Wants Without difficulty   Understanding Verbal and Non-Verbal Content Understands   Functional Level of Assist   Eating Stand by Assist   Eating Description Insert dentures;Increased time;Supervision for safety  (Poor dentition )   Grooming Seated;Moderate Assist   Grooming Description Seated in wheelchair at sink;Increased time;Other (comment)  (Assist with hair hygiene secondary to hair matting)   Bathing Minimal Assist   Bathing Description Hand held shower;Grab bar;Increased time;Assit wtih lower extremities;Initial preparation for task;Tub bench   Upper Body Dressing Minimal Assist   Upper Body Dressing Description Increased time;Assist with pulling shirt over head   Lower Body Dressing Maximal Assist   Lower Body Dressing Description Assist with threading into pant leg;Increased time;Verbal cueing;Grab bar   Bed, Chair, Wheelchair Transfer Moderate Assist   Bed Chair Wheelchair Transfer Description Requires lift;Increased time;Initial preparation for task;Supervision for safety;Squat pivot transfer to wheelchair   Tub / Shower Transfers Moderate Assist   Tub Shower Transfer  Description Increased time;Requires lift;Supervision for safety;Verbal cueing   Problem List   Problem List Decreased Active Daily Living Skills;Decreased Homemaking Skills;Decreased Functional Mobility;Decreased Activity Tolerance;Impaired Postural Control / Balance;Decreased Upper Extremity AROM Left;Decreased Upper Extremity AROM Right;Decreased Upper Extremity Strength Left;Decreased Upper Extremity Strength Right   Precautions   Precautions Fall Risk;Weight Bearing As Tolerated Left Lower Extremity   Comments hx of extensive R knee surgical intervention   Current Discharge Plan   Current Discharge Plan Return to Prior Living Situation   Benefit    Therapy Benefit Patient Would Benefit from Inpatient Rehab Occupational Therapy to Maximize [place] with ADLs, IADLs and Functional Mobility.   Interdisciplinary Plan of Care Collaboration   IDT Collaboration with  Nursing   Patient Position at End of Therapy Seated;Chair Alarm On;Self Releasing Lap Belt Applied;Call Light within Reach;Tray Table within Reach;Phone within Reach   Collaboration Comments Re: CLOF   Equipment Needs   Assistive Device / DME Tub Transfer Bench   Adaptive Equipment Reacher;Sock Aide;Long Handled Shoe Horn;Long Handled Sponge   Strengths & Barriers   Strengths Able to follow instructions;Alert and oriented;Effective communication skills;Good insight into deficits/needs;Manages pain appropriately;Motivated for self care and independence;Pleasant and cooperative;Supportive family;Willingly participates in therapeutic activities   Barriers Decreased endurance;Generalized weakness;Limited mobility;Pain   OT Total Time Spent   OT Individual Total Time Spent (Mins) 60   OT Charge Group   Charges Yes   OT Self Care / ADL 15   OT Evaluation OT Evaluation High       Assessment  Patient is 66 y.o. male with a diagnosis of Closed left hip fracture with IM nailing.  Additional factors influencing patient status / progress (ie: cognitive factors,  co-morbidities, social support, etc): hx of CAD, COPD, HTN, Hypothyroidism, and back pain with acute stay complications including anemia, thrombocytopenia, leukocytosis, and left leg numbness.  Patient attests hx of peripheral neuropathy prior to recent acute injury.  At baseline, patient reports requiring intermittent assistance from significant other for IADLs and occasionally for dressing/bathing pursuits pain dependent (chronic back pain).  Hx of bilateral shoulder surgeries and R knee surgery with patella removal limits overall mobility and safety with standing pursuits.  4WW typically used for in-home mobility with manual w/c used for community mobility (power chair transport is a current barrier for increased independence with community mobility).       Plan  Recommend Occupational Therapy  minutes per day 5-7 days per week for 10-14 days for the following treatments:  OT Self Care/ADL, OT Community Reintegration, OT Neuro Re-Ed/Balance, OT Therapeutic Activity, OT Evaluation and OT Therapeutic Exercise.    Passport items to be completed:  Perform bathroom transfers, complete dressing, complete feeding, get ready for the day, prepare a simple meal, participate in household tasks, adapt home for safety needs, demonstrate home exercise program, complete caregiver training     Goals:  Long term and short term goals have been discussed with patient and they are in agreement.    Occupational Therapy Goals (Active)      There are no active problems.

## 2021-08-08 LAB
BASOPHILS # BLD AUTO: 0.8 % (ref 0–1.8)
BASOPHILS # BLD: 0.08 K/UL (ref 0–0.12)
EOSINOPHIL # BLD AUTO: 0.59 K/UL (ref 0–0.51)
EOSINOPHIL NFR BLD: 5.8 % (ref 0–6.9)
ERYTHROCYTE [DISTWIDTH] IN BLOOD BY AUTOMATED COUNT: 44 FL (ref 35.9–50)
HCT VFR BLD AUTO: 32.4 % (ref 42–52)
HGB BLD-MCNC: 10.3 G/DL (ref 14–18)
IMM GRANULOCYTES # BLD AUTO: 0.08 K/UL (ref 0–0.11)
IMM GRANULOCYTES NFR BLD AUTO: 0.8 % (ref 0–0.9)
LYMPHOCYTES # BLD AUTO: 2.31 K/UL (ref 1–4.8)
LYMPHOCYTES NFR BLD: 22.9 % (ref 22–41)
MCH RBC QN AUTO: 27.5 PG (ref 27–33)
MCHC RBC AUTO-ENTMCNC: 31.8 G/DL (ref 33.7–35.3)
MCV RBC AUTO: 86.6 FL (ref 81.4–97.8)
MONOCYTES # BLD AUTO: 0.85 K/UL (ref 0–0.85)
MONOCYTES NFR BLD AUTO: 8.4 % (ref 0–13.4)
NEUTROPHILS # BLD AUTO: 6.19 K/UL (ref 1.82–7.42)
NEUTROPHILS NFR BLD: 61.3 % (ref 44–72)
NRBC # BLD AUTO: 0 K/UL
NRBC BLD-RTO: 0 /100 WBC
PLATELET # BLD AUTO: 169 K/UL (ref 164–446)
PMV BLD AUTO: 10.9 FL (ref 9–12.9)
RBC # BLD AUTO: 3.74 M/UL (ref 4.7–6.1)
SARS-COV-2 RNA RESP QL NAA+PROBE: NOTDETECTED
SPECIMEN SOURCE: NORMAL
WBC # BLD AUTO: 10.1 K/UL (ref 4.8–10.8)

## 2021-08-08 PROCEDURE — 94760 N-INVAS EAR/PLS OXIMETRY 1: CPT

## 2021-08-08 PROCEDURE — 700102 HCHG RX REV CODE 250 W/ 637 OVERRIDE(OP): Performed by: PHYSICAL MEDICINE & REHABILITATION

## 2021-08-08 PROCEDURE — A9270 NON-COVERED ITEM OR SERVICE: HCPCS | Performed by: PHYSICAL MEDICINE & REHABILITATION

## 2021-08-08 PROCEDURE — 700111 HCHG RX REV CODE 636 W/ 250 OVERRIDE (IP): Performed by: PHYSICAL MEDICINE & REHABILITATION

## 2021-08-08 PROCEDURE — 97110 THERAPEUTIC EXERCISES: CPT

## 2021-08-08 PROCEDURE — 770010 HCHG ROOM/CARE - REHAB SEMI PRIVAT*

## 2021-08-08 PROCEDURE — 36415 COLL VENOUS BLD VENIPUNCTURE: CPT

## 2021-08-08 PROCEDURE — 85025 COMPLETE CBC W/AUTO DIFF WBC: CPT

## 2021-08-08 PROCEDURE — 97116 GAIT TRAINING THERAPY: CPT

## 2021-08-08 RX ADMIN — MORPHINE SULFATE 15 MG: 15 TABLET ORAL at 05:47

## 2021-08-08 RX ADMIN — ACETAMINOPHEN 650 MG: 325 TABLET ORAL at 20:51

## 2021-08-08 RX ADMIN — ISOSORBIDE MONONITRATE 60 MG: 30 TABLET, EXTENDED RELEASE ORAL at 20:53

## 2021-08-08 RX ADMIN — ATORVASTATIN CALCIUM 40 MG: 40 TABLET, FILM COATED ORAL at 20:53

## 2021-08-08 RX ADMIN — TRAZODONE HYDROCHLORIDE 50 MG: 50 TABLET ORAL at 23:05

## 2021-08-08 RX ADMIN — SERTRALINE HYDROCHLORIDE 100 MG: 50 TABLET ORAL at 20:51

## 2021-08-08 RX ADMIN — METOPROLOL TARTRATE 50 MG: 25 TABLET, FILM COATED ORAL at 17:35

## 2021-08-08 RX ADMIN — ISOSORBIDE MONONITRATE 60 MG: 30 TABLET, EXTENDED RELEASE ORAL at 09:20

## 2021-08-08 RX ADMIN — TAMSULOSIN HYDROCHLORIDE 0.4 MG: 0.4 CAPSULE ORAL at 20:53

## 2021-08-08 RX ADMIN — OXYCODONE HYDROCHLORIDE 10 MG: 10 TABLET ORAL at 15:06

## 2021-08-08 RX ADMIN — LEVOTHYROXINE SODIUM 50 MCG: 50 TABLET ORAL at 05:47

## 2021-08-08 RX ADMIN — MORPHINE SULFATE 15 MG: 15 TABLET ORAL at 23:05

## 2021-08-08 RX ADMIN — SERTRALINE HYDROCHLORIDE 50 MG: 50 TABLET ORAL at 09:20

## 2021-08-08 RX ADMIN — OMEPRAZOLE 20 MG: 20 CAPSULE, DELAYED RELEASE ORAL at 09:20

## 2021-08-08 RX ADMIN — OXYCODONE HYDROCHLORIDE 10 MG: 10 TABLET ORAL at 09:23

## 2021-08-08 RX ADMIN — METOPROLOL TARTRATE 50 MG: 25 TABLET, FILM COATED ORAL at 05:47

## 2021-08-08 RX ADMIN — MORPHINE SULFATE 15 MG: 15 TABLET ORAL at 17:35

## 2021-08-08 RX ADMIN — CLOPIDOGREL BISULFATE 75 MG: 75 TABLET ORAL at 09:20

## 2021-08-08 RX ADMIN — ACETAMINOPHEN 650 MG: 325 TABLET ORAL at 15:03

## 2021-08-08 RX ADMIN — CLONIDINE HYDROCHLORIDE 0.1 MG: 0.1 TABLET ORAL at 09:20

## 2021-08-08 RX ADMIN — ACETAMINOPHEN 650 MG: 325 TABLET ORAL at 09:20

## 2021-08-08 RX ADMIN — MORPHINE SULFATE 15 MG: 15 TABLET ORAL at 11:11

## 2021-08-08 RX ADMIN — FAMOTIDINE 40 MG: 20 TABLET ORAL at 20:52

## 2021-08-08 RX ADMIN — ENOXAPARIN SODIUM 40 MG: 40 INJECTION SUBCUTANEOUS at 09:20

## 2021-08-08 RX ADMIN — MORPHINE SULFATE 15 MG: 15 TABLET ORAL at 00:34

## 2021-08-08 ASSESSMENT — GAIT ASSESSMENTS
ASSISTIVE DEVICE: FRONT WHEEL WALKER
GAIT LEVEL OF ASSIST: MINIMAL ASSIST
DISTANCE (FEET): 110

## 2021-08-08 ASSESSMENT — PAIN DESCRIPTION - PAIN TYPE
TYPE: ACUTE PAIN
TYPE: ACUTE PAIN;CHRONIC PAIN

## 2021-08-08 ASSESSMENT — FIBROSIS 4 INDEX: FIB4 SCORE: 1.8

## 2021-08-08 NOTE — FLOWSHEET NOTE
08/08/21 1358   Events/Summary/Plan   Events/Summary/Plan 02 spot check.  Will have pt use 2 lpm at night for MARCUS (does not tolerate CPAP and was noted to desaturate at night)   Vital Signs   Pulse 76   Respiration 16   Pulse Oximetry 93 %   $ Pulse Oximetry (Spot Check) Yes   Respiratory Assessment   Level of Consciousness Alert   Chest Exam   Work Of Breathing / Effort Within Normal Limits   Oxygen   O2 (LPM) 2   O2 Delivery Device Silicone Nasal Cannula

## 2021-08-08 NOTE — CARE PLAN
"  Problem: Fall Risk - Rehab  Goal: Patient will remain free from falls  Outcome: Progressing  Note: Corie Rm Fall risk Assessment Score: 15    High fall risk Interventions   - Bed and strip alarm   - Yellow sign by the door   - Yellow wrist band \"Fall risk\"  - Do not leave patient unattended in the bathroom  - Fall risk education provided     Problem: Pain - Standard  Goal: Alleviation of pain or a reduction in pain to the patient’s comfort goal  Outcome: Progressing  Note: Pt has chronic back pain managed with scheduled morphine. PRN oxycodone available for breakthrough L hip pain. Pt able to participate in all therapies and activities this shift; will continue to monitor.     "

## 2021-08-08 NOTE — THERAPY
Physical Therapy   Daily Treatment     Patient Name: Antony Akbar  Age:  66 y.o., Sex:  male  Medical Record #: [id number]  Today's Date: 8/8/2021     Precautions  Precautions: Fall Risk, Weight Bearing As Tolerated Left Lower Extremity  Comments: pacemaker    Subjective    Pt greeted in bed and agreeable to PT session. Pt reporting some chronic back pain today and some minimal L hip pain.      Objective       08/08/21 1401   Precautions   Precautions Fall Risk;Weight Bearing As Tolerated Left Lower Extremity   Comments pacemaker   Cognition    Cognition / Consciousness WDL   Level of Consciousness Alert   Gait Functional Level of Assist    Gait Level Of Assist Minimal Assist  (CGA)   Assistive Device Front Wheel Walker   Distance (Feet) 110   # of Times Distance was Traveled 3   Deviation Bradykinetic;Decreased Heel Strike;Decreased Toe Off;Step To;Antalgic   Stairs Functional Level of Assist   # of Stairs Climbed 0   Transfer Functional Level of Assist   Bed, Chair, Wheelchair Transfer Minimal Assist   Sitting Lower Body Exercises   Sitting Lower Body Exercises Yes   Ankle Pumps 2 sets of 10;Bilateral   Long Arc Quad 2 sets of 10;Left  (pt unable to perform w/RLE due to baseline weakness)   Marching 2 sets of 10;Reciprocal   Bed Mobility    Supine to Sit Supervised   Sit to Supine   (not tested, pt up in chair after session)   Sit to Stand Minimal Assist   Scooting Supervised   Interdisciplinary Plan of Care Collaboration   Patient Position at End of Therapy Seated;Chair Alarm On;Call Light within Reach;Tray Table within Reach;Phone within Reach   PT Total Time Spent   PT Individual Total Time Spent (Mins) 60   PT Charge Group   PT Gait Training 2   PT Therapeutic Exercise 2       Assessment    Pt demonstrating improvements in overall gait tolerance and mobility this session. Pt trained on safe transfers and gait as well as seated exercises as detailed above for BLE strength and mobility. Pt reporting fatigue  following session and had increased sweating with activity.     Strengths: Able to follow instructions, Effective communication skills, Good insight into deficits/needs, Pleasant and cooperative, Supportive family, Willingly participates in therapeutic activities  Barriers: Decreased endurance, Generalized weakness, Impaired activity tolerance, Impaired balance    Plan    Continue to increase gait tolerance/distance, improve BLE strength, work on dynamic balance exercises, practice ascending/descending a ramp with walker, and practice getting into/out of a car    Passport items to be completed:  Get in/out of bed safely, in/out of a vehicle, safely use mobility device, walk or wheel around home/community, navigate up and down stairs, show how to get up/down from the ground, ensure home is accessible, demonstrate HEP, complete caregiver training    Physical Therapy Problems (Active)     Problem: Mobility     Dates: Start: 08/07/21       Goal: STG-Within one week, patient will ambulate household distance     Dates: Start: 08/07/21       Goal Note filed on 08/07/21 1608 by eTri Vega DPT     1) Individualized goal:   >50' with FWW and SBA  2) Interventions:  PT Group Therapy, PT Gait Training, PT Self Care/Home Eval, PT Therapeutic Exercises, PT Neuro Re-Ed/Balance, PT Therapeutic Activity, and PT Evaluation               Problem: Mobility Transfers     Dates: Start: 08/07/21       Goal: STG-Within one week, patient will perform bed mobility     Dates: Start: 08/07/21       Goal Note filed on 08/07/21 1608 by CARRINGTON SalgueroT     1) Individualized goal:   SPV on hospital bed with use of bedcontrol as pt has a hospital bed at home  2) Interventions: PT Group Therapy, PT Gait Training, PT Self Care/Home Eval, PT Therapeutic Exercises, PT Neuro Re-Ed/Balance, PT Therapeutic Activity, and PT Evaluation            Goal: STG-Within one week, patient will transfer bed to chair     Dates: Start: 08/07/21       Goal  Note filed on 08/07/21 1608 by Teri Vega DPT     1) Individualized goal:   SPV with FWW or 4WW  2) Interventions: PT Group Therapy, PT Gait Training, PT Self Care/Home Eval, PT Therapeutic Exercises, PT Neuro Re-Ed/Balance, PT Therapeutic Activity, and PT Evaluation               Problem: PT-Long Term Goals     Dates: Start: 08/07/21       Goal: LTG-By discharge, patient will ambulate     Dates: Start: 08/07/21       Goal Note filed on 08/07/21 1608 by Teri Vega DPT     1) Individualized goal:   >55' with FWW or 4WW and Mod I to safely navigate his home environment  2) Interventions: PT Group Therapy, PT Gait Training, PT Self Care/Home Eval, PT Therapeutic Exercises, PT Neuro Re-Ed/Balance, PT Therapeutic Activity, and PT Evaluation            Goal: LTG-By discharge, patient will transfer one surface to another     Dates: Start: 08/07/21       Goal Note filed on 08/07/21 1608 by Teri Vega DPT     1) Individualized goal:   Mod I with FWW or 4WW  2) Interventions:  PT Group Therapy, PT Gait Training, PT Self Care/Home Eval, PT Therapeutic Exercises, PT Neuro Re-Ed/Balance, PT Therapeutic Activity, and PT Evaluation            Goal: LTG-By discharge, patient will transfer in/out of a car     Dates: Start: 08/07/21       Goal Note filed on 08/07/21 1608 by Teri Vega DPT     1) Individualized goal:   SPV from ambulatory level with FWW or 4WW  2) Interventions:  PT Group Therapy, PT Gait Training, PT Self Care/Home Eval, PT Therapeutic Exercises, PT Neuro Re-Ed/Balance, PT Therapeutic Activity, and PT Evaluation            Goal: LTG-By discharge, patient will     Dates: Start: 08/07/21       Goal Note filed on 08/07/21 1608 by Teri Vega DPT     1) Individualized goal:   ascend/descend an ADA ramp with 4WW or FWW and SPV to safely enter/exit his home  2) Interventions:  PT Group Therapy, PT Gait Training, PT Self Care/Home Eval, PT Therapeutic Exercises, PT Neuro Re-Ed/Balance, PT  Therapeutic Activity, and PT Evaluation

## 2021-08-08 NOTE — DISCHARGE PLANNING
CASE MANAGEMENT INITIAL ASSESSMENT    Admit Date:  8/6/2021     I spoke with patients significant other to discuss role of case management / discharge planning / team conference. She was able to provide information.   Patient is a  66 y.o. male transferred from [facility] where he was admitted from 08/04-08/06.    Admitting physician: Dr. Singh   PCP: Suman Hope   Orthopedic: Dr. Ríos     Diagnosis: S/p left hip fracture [Z87.81]    Co-morbidities:   Patient Active Problem List    Diagnosis Date Noted   • Hypothyroidism 08/04/2021   • Closed left hip fracture (HCC) 08/04/2021   • COPD (chronic obstructive pulmonary disease) (HCC) 08/04/2021   • Essential hypertension 08/04/2021   • CAD (coronary artery disease) of artery bypass graft 08/04/2021   • GERD (gastroesophageal reflux disease) 08/04/2021   • Leukocytosis 08/04/2021     Prior Living Situation:  Housing / Facility: [address] [place] [address]  Lives with - Patient's Self Care Capacity: Significant Other    Prior Level of Function:  Medication Management: Independent  Finances: Independent  Home Management: Requires Assist  Shopping: Requires Assist  Prior Level Of Mobility: Independent With Device in Home, Uses Wheel Chair for Community Mobility  Driving / Transportation: Relatives / Others Provide Transportation    Support Systems:  Primary : Jeni Beckham ) [phone]    Previous Services Utilized:   Equipment Owned: 4-Wheel Walker, Front-Wheel Walker, Power Wheel Chair  Prior Services: Intermittent Physical Support for ADL Per Family    Other Information:  Occupation (Pre-Hospital Vocational): Retired Due To Disability  Primary Payor Source: Medicare A, Medicare B  Secondary Payor Source: Other (Comments) (medicaid )  Primary Care Practitioner : Suman Hope   Other MDs: Ortho: Dr. Ríos     Patient / Family Goal:  Patient / Family Goal: patient lives in a single level home in [place]. There is a ramp to enter. Patient lives is SO.  Patient has DME that he uses on a daily basis. Patient SO is a CNA and will be able to provide some help at discharge but she works monday-friday. Wifes goals for patient is to get home and be as independent as prior to fall.     Additional Case Management Questions:  Have you ever received case management services for yourself or a family member? Yes     Do you feel you have and an understanding of what services  provide? Yes     Do you have any additional questions regarding case management? No      CASE MANAGEMENT PLAN OF CARE   Individualized Goals:   1. Get home asap  2. Be as independent as before fall     Barriers:   1. Functional limitations     Plan:  1. Continue to follow patient through hospitalization and provide discharge planning in collaboration with patient, family, physicians and ancillary services.     2. Utilize community resources to ensure a safe discharge.

## 2021-08-09 PROCEDURE — A9270 NON-COVERED ITEM OR SERVICE: HCPCS | Performed by: PHYSICAL MEDICINE & REHABILITATION

## 2021-08-09 PROCEDURE — 99233 SBSQ HOSP IP/OBS HIGH 50: CPT | Performed by: PHYSICAL MEDICINE & REHABILITATION

## 2021-08-09 PROCEDURE — 700101 HCHG RX REV CODE 250: Performed by: PHYSICAL MEDICINE & REHABILITATION

## 2021-08-09 PROCEDURE — 700111 HCHG RX REV CODE 636 W/ 250 OVERRIDE (IP): Performed by: PHYSICAL MEDICINE & REHABILITATION

## 2021-08-09 PROCEDURE — 97110 THERAPEUTIC EXERCISES: CPT

## 2021-08-09 PROCEDURE — 97116 GAIT TRAINING THERAPY: CPT

## 2021-08-09 PROCEDURE — 94760 N-INVAS EAR/PLS OXIMETRY 1: CPT

## 2021-08-09 PROCEDURE — 97530 THERAPEUTIC ACTIVITIES: CPT

## 2021-08-09 PROCEDURE — 97535 SELF CARE MNGMENT TRAINING: CPT

## 2021-08-09 PROCEDURE — 770010 HCHG ROOM/CARE - REHAB SEMI PRIVAT*

## 2021-08-09 PROCEDURE — 700102 HCHG RX REV CODE 250 W/ 637 OVERRIDE(OP): Performed by: PHYSICAL MEDICINE & REHABILITATION

## 2021-08-09 RX ORDER — ERYTHROMYCIN 5 MG/G
OINTMENT OPHTHALMIC 4 TIMES DAILY
Status: COMPLETED | OUTPATIENT
Start: 2021-08-09 | End: 2021-08-12

## 2021-08-09 RX ORDER — ERYTHROMYCIN 5 MG/G
OINTMENT OPHTHALMIC EVERY 6 HOURS
Status: DISCONTINUED | OUTPATIENT
Start: 2021-08-09 | End: 2021-08-09

## 2021-08-09 RX ADMIN — MORPHINE SULFATE 15 MG: 15 TABLET ORAL at 23:34

## 2021-08-09 RX ADMIN — MORPHINE SULFATE 15 MG: 15 TABLET ORAL at 18:03

## 2021-08-09 RX ADMIN — MORPHINE SULFATE 15 MG: 15 TABLET ORAL at 05:57

## 2021-08-09 RX ADMIN — CLOPIDOGREL BISULFATE 75 MG: 75 TABLET ORAL at 09:11

## 2021-08-09 RX ADMIN — TAMSULOSIN HYDROCHLORIDE 0.4 MG: 0.4 CAPSULE ORAL at 20:41

## 2021-08-09 RX ADMIN — ERYTHROMYCIN: 5 OINTMENT OPHTHALMIC at 18:02

## 2021-08-09 RX ADMIN — SERTRALINE HYDROCHLORIDE 100 MG: 50 TABLET ORAL at 20:41

## 2021-08-09 RX ADMIN — FAMOTIDINE 40 MG: 20 TABLET ORAL at 20:42

## 2021-08-09 RX ADMIN — ACETAMINOPHEN 650 MG: 325 TABLET ORAL at 09:11

## 2021-08-09 RX ADMIN — ATORVASTATIN CALCIUM 40 MG: 40 TABLET, FILM COATED ORAL at 20:42

## 2021-08-09 RX ADMIN — METOPROLOL TARTRATE 75 MG: 25 TABLET, FILM COATED ORAL at 18:02

## 2021-08-09 RX ADMIN — OMEPRAZOLE 20 MG: 20 CAPSULE, DELAYED RELEASE ORAL at 09:12

## 2021-08-09 RX ADMIN — LEVOTHYROXINE SODIUM 25 MCG: 25 TABLET ORAL at 05:57

## 2021-08-09 RX ADMIN — MORPHINE SULFATE 15 MG: 15 TABLET ORAL at 12:43

## 2021-08-09 RX ADMIN — ISOSORBIDE MONONITRATE 60 MG: 30 TABLET, EXTENDED RELEASE ORAL at 09:09

## 2021-08-09 RX ADMIN — ISOSORBIDE MONONITRATE 60 MG: 30 TABLET, EXTENDED RELEASE ORAL at 20:42

## 2021-08-09 RX ADMIN — SERTRALINE HYDROCHLORIDE 50 MG: 50 TABLET ORAL at 09:12

## 2021-08-09 RX ADMIN — ACETAMINOPHEN 650 MG: 325 TABLET ORAL at 20:41

## 2021-08-09 RX ADMIN — SENNOSIDES AND DOCUSATE SODIUM 2 TABLET: 8.6; 5 TABLET ORAL at 09:10

## 2021-08-09 RX ADMIN — ERYTHROMYCIN: 5 OINTMENT OPHTHALMIC at 12:44

## 2021-08-09 RX ADMIN — SENNOSIDES AND DOCUSATE SODIUM 2 TABLET: 8.6; 5 TABLET ORAL at 20:41

## 2021-08-09 RX ADMIN — METOPROLOL TARTRATE 50 MG: 25 TABLET, FILM COATED ORAL at 05:56

## 2021-08-09 RX ADMIN — ACETAMINOPHEN 650 MG: 325 TABLET ORAL at 14:17

## 2021-08-09 RX ADMIN — OXYCODONE HYDROCHLORIDE 10 MG: 10 TABLET ORAL at 20:41

## 2021-08-09 RX ADMIN — CLONIDINE HYDROCHLORIDE 0.1 MG: 0.1 TABLET ORAL at 09:11

## 2021-08-09 RX ADMIN — ERYTHROMYCIN: 5 OINTMENT OPHTHALMIC at 20:45

## 2021-08-09 RX ADMIN — POLYVINYL ALCOHOL 1 DROP: 14 SOLUTION/ DROPS OPHTHALMIC at 20:44

## 2021-08-09 RX ADMIN — ENOXAPARIN SODIUM 40 MG: 40 INJECTION SUBCUTANEOUS at 09:12

## 2021-08-09 ASSESSMENT — PAIN DESCRIPTION - PAIN TYPE
TYPE: ACUTE PAIN

## 2021-08-09 ASSESSMENT — GAIT ASSESSMENTS
GAIT LEVEL OF ASSIST: MINIMAL ASSIST
ASSISTIVE DEVICE: FRONT WHEEL WALKER

## 2021-08-09 ASSESSMENT — ACTIVITIES OF DAILY LIVING (ADL)
BED_CHAIR_WHEELCHAIR_TRANSFER_DESCRIPTION: ADAPTIVE EQUIPMENT;INCREASED TIME;INITIAL PREPARATION FOR TASK;SET-UP OF EQUIPMENT
TOILET_TRANSFER_DESCRIPTION: ADAPTIVE EQUIPMENT;GRAB BAR;INCREASED TIME;INITIAL PREPARATION FOR TASK;SUPERVISION FOR SAFETY

## 2021-08-09 NOTE — CARE PLAN
"  Problem: Fall Risk - Rehab  Goal: Patient will remain free from falls  Outcome: Progressing  Note: Corie Rm Fall risk Assessment Score: 15    High fall risk Interventions   - Bed and strip alarm   - Yellow sign by the door   - Yellow wrist band \"Fall risk\"  - Do not leave patient unattended in the bathroom  - Fall risk education provided     Problem: Infection  Goal: Patient will remain free from infection  Outcome: Progressing  Note: Patient remains free from s/s infection; afebrile. Admit COVID screening has returned negative; pt taken off isolation precautions. Will continue to monitor.      "

## 2021-08-09 NOTE — PROGRESS NOTES
Received patient on bed. Patient conscious coherent not in cp distress. Patient verbalized been having moderate pain but managed by PRN pain meds. Patient verbalized no concerns for now. Safety and fall precaution reinforced.

## 2021-08-09 NOTE — PROGRESS NOTES
"Rehab Progress Note     Encounter Date: 8/9/2021    CC: decreased mobility, decreased endurance    Interval Events (Subjective)  Patient sitting up in room. He reports his pain tolerance has greatly improved since admission. Reviewed labs and patient's leukocytosis has resolved. Discussed would continue to follow his anemia.  He reports his biggest concern today is drainage from his eyes. He reports it is mostly just tears but sometimes his eyes burn and become reddened. He reports he thought it might have been just dirt from the fall but now worsening. Discussed to avoid touching his eyes and will do antibiotics for a few days. Denies fever or chills.     Objective:  VITAL SIGNS: /82   Pulse 70   Temp 36.3 °C (97.3 °F) (Oral)   Resp 18   Ht 1.707 m (5' 7.2\")   Wt 84.5 kg (186 lb 4.6 oz)   SpO2 97%   BMI 29.00 kg/m²   Gen: NAD  Psych: Mood and affect appropriate  CV: RRR, no edema  Resp: CTAB, no upper airway sounds  Abd: NTND  Neuro: AOx4, following commands, vision intact with weeping from both eyes    Recent Results (from the past 72 hour(s))   CBC with Differential    Collection Time: 08/07/21  5:23 AM   Result Value Ref Range    WBC 11.8 (H) 4.8 - 10.8 K/uL    RBC 3.83 (L) 4.70 - 6.10 M/uL    Hemoglobin 10.5 (L) 14.0 - 18.0 g/dL    Hematocrit 33.4 (L) 42.0 - 52.0 %    MCV 87.2 81.4 - 97.8 fL    MCH 27.4 27.0 - 33.0 pg    MCHC 31.4 (L) 33.7 - 35.3 g/dL    RDW 44.2 35.9 - 50.0 fL    Platelet Count 155 (L) 164 - 446 K/uL    MPV 11.6 9.0 - 12.9 fL    Neutrophils-Polys 72.20 (H) 44.00 - 72.00 %    Lymphocytes 14.40 (L) 22.00 - 41.00 %    Monocytes 8.00 0.00 - 13.40 %    Eosinophils 4.20 0.00 - 6.90 %    Basophils 0.50 0.00 - 1.80 %    Immature Granulocytes 0.70 0.00 - 0.90 %    Nucleated RBC 0.00 /100 WBC    Neutrophils (Absolute) 8.50 (H) 1.82 - 7.42 K/uL    Lymphs (Absolute) 1.69 1.00 - 4.80 K/uL    Monos (Absolute) 0.94 (H) 0.00 - 0.85 K/uL    Eos (Absolute) 0.50 0.00 - 0.51 K/uL    Baso (Absolute) " 0.06 0.00 - 0.12 K/uL    Immature Granulocytes (abs) 0.08 0.00 - 0.11 K/uL    NRBC (Absolute) 0.00 K/uL   Comp Metabolic Panel (CMP)    Collection Time: 08/07/21  5:23 AM   Result Value Ref Range    Sodium 140 135 - 145 mmol/L    Potassium 3.6 3.6 - 5.5 mmol/L    Chloride 104 96 - 112 mmol/L    Co2 22 20 - 33 mmol/L    Anion Gap 14.0 7.0 - 16.0    Glucose 101 (H) 65 - 99 mg/dL    Bun 11 8 - 22 mg/dL    Creatinine 0.78 0.50 - 1.40 mg/dL    Calcium 8.7 8.5 - 10.5 mg/dL    AST(SGOT) 15 12 - 45 U/L    ALT(SGPT) 10 2 - 50 U/L    Alkaline Phosphatase 97 30 - 99 U/L    Total Bilirubin 0.5 0.1 - 1.5 mg/dL    Albumin 3.5 3.2 - 4.9 g/dL    Total Protein 6.3 6.0 - 8.2 g/dL    Globulin 2.8 1.9 - 3.5 g/dL    A-G Ratio 1.3 g/dL   HEMOGLOBIN A1C    Collection Time: 08/07/21  5:23 AM   Result Value Ref Range    Glycohemoglobin 5.8 (H) 4.0 - 5.6 %    Est Avg Glucose 120 mg/dL   TSH with Reflex to FT4    Collection Time: 08/07/21  5:23 AM   Result Value Ref Range    TSH 4.180 0.380 - 5.330 uIU/mL   Vitamin D, 25-hydroxy (blood)    Collection Time: 08/07/21  5:23 AM   Result Value Ref Range    25-Hydroxy   Vitamin D 25 28 (L) 30 - 100 ng/mL   ESTIMATED GFR    Collection Time: 08/07/21  5:23 AM   Result Value Ref Range    GFR If African American >60 >60 mL/min/1.73 m 2    GFR If Non African American >60 >60 mL/min/1.73 m 2   CBC WITH DIFFERENTIAL    Collection Time: 08/08/21  5:32 AM   Result Value Ref Range    WBC 10.1 4.8 - 10.8 K/uL    RBC 3.74 (L) 4.70 - 6.10 M/uL    Hemoglobin 10.3 (L) 14.0 - 18.0 g/dL    Hematocrit 32.4 (L) 42.0 - 52.0 %    MCV 86.6 81.4 - 97.8 fL    MCH 27.5 27.0 - 33.0 pg    MCHC 31.8 (L) 33.7 - 35.3 g/dL    RDW 44.0 35.9 - 50.0 fL    Platelet Count 169 164 - 446 K/uL    MPV 10.9 9.0 - 12.9 fL    Neutrophils-Polys 61.30 44.00 - 72.00 %    Lymphocytes 22.90 22.00 - 41.00 %    Monocytes 8.40 0.00 - 13.40 %    Eosinophils 5.80 0.00 - 6.90 %    Basophils 0.80 0.00 - 1.80 %    Immature Granulocytes 0.80 0.00 - 0.90  %    Nucleated RBC 0.00 /100 WBC    Neutrophils (Absolute) 6.19 1.82 - 7.42 K/uL    Lymphs (Absolute) 2.31 1.00 - 4.80 K/uL    Monos (Absolute) 0.85 0.00 - 0.85 K/uL    Eos (Absolute) 0.59 (H) 0.00 - 0.51 K/uL    Baso (Absolute) 0.08 0.00 - 0.12 K/uL    Immature Granulocytes (abs) 0.08 0.00 - 0.11 K/uL    NRBC (Absolute) 0.00 K/uL       Current Facility-Administered Medications   Medication Frequency   • erythromycin ophthalmic ointment 4X/DAY   • melatonin tablet 3 mg QHS PRN   • Respiratory Therapy Consult Continuous RT   • Pharmacy Consult Request ...Pain Management Review 1 Each PHARMACY TO DOSE   • oxyCODONE immediate-release (ROXICODONE) tablet 5 mg Q3HRS PRN    Or   • oxyCODONE immediate release (ROXICODONE) tablet 10 mg Q3HRS PRN   • hydrALAZINE (APRESOLINE) tablet 25 mg Q8HRS PRN   • acetaminophen (Tylenol) tablet 650 mg Q4HRS PRN   • senna-docusate (PERICOLACE or SENOKOT S) 8.6-50 MG per tablet 2 tablet BID    And   • polyethylene glycol/lytes (MIRALAX) PACKET 1 Packet QDAY PRN    And   • magnesium hydroxide (MILK OF MAGNESIA) suspension 30 mL QDAY PRN    And   • bisacodyl (DULCOLAX) suppository 10 mg QDAY PRN   • artificial tears ophthalmic solution 1 Drop PRN   • benzocaine-menthol (CEPACOL) lozenge 1 Lozenge Q2HRS PRN   • mag hydrox-al hydrox-simeth (MAALOX PLUS ES or MYLANTA DS) suspension 20 mL Q2HRS PRN   • ondansetron (ZOFRAN ODT) dispertab 4 mg 4X/DAY PRN    Or   • ondansetron (ZOFRAN) syringe/vial injection 4 mg 4X/DAY PRN   • traZODone (DESYREL) tablet 50 mg QHS PRN   • sodium chloride (OCEAN) 0.65 % nasal spray 2 Spray PRN   • acetaminophen (Tylenol) tablet 650 mg TID   • albuterol inhaler 2 Puff Q6HRS PRN   • atorvastatin (LIPITOR) tablet 40 mg Nightly   • butalbital/apap/caffeine -40 mg (Fioricet) per tablet 1 tablet Q4HRS PRN   • cloNIDine (CATAPRES) tablet 0.1 mg QAM   • clopidogrel (PLAVIX) tablet 75 mg DAILY   • enoxaparin (LOVENOX) inj 40 mg DAILY   • famotidine (PEPCID) tablet 40  mg Q EVENING   • isosorbide mononitrate SR (IMDUR) tablet 60 mg BID   • levothyroxine (SYNTHROID) tablet 25 mcg Once per day on Mon Tue Wed Thu Fri   • levothyroxine (SYNTHROID) tablet 50 mcg Once per day on Sun Sat   • metoprolol tartrate (LOPRESSOR) tablet 50 mg BID   • omeprazole (PRILOSEC) capsule 20 mg QAM   • sertraline (Zoloft) tablet 100 mg Q EVENING   • sertraline (Zoloft) tablet 50 mg QAM   • tamsulosin (FLOMAX) capsule 0.4 mg Q EVENING   • morphine (MS IR) tablet 15 mg Q6HRS       Orders Placed This Encounter   Procedures   • Diet Order Diet: Regular     Standing Status:   Standing     Number of Occurrences:   1     Order Specific Question:   Diet:     Answer:   Regular [1]       Assessment:  Active Hospital Problems    Diagnosis    • *Closed left hip fracture (HCC)    • CAD (coronary artery disease) of artery bypass graft    • COPD (chronic obstructive pulmonary disease) (HCC)    • Essential hypertension    • GERD (gastroesophageal reflux disease)    • Hypothyroidism    • Leukocytosis        Medical Decision Making and Plan:  Left hip fracture - GLF on 8/4/21 due to dog found to have left hip fracture. Patient is now s/p intramedullary device with Dr. Ríos on 8/4/21. WBAT. Has some numbness to left leg, will monitor.   -PT and OT for mobility and ADLs  -Follow-up Orthopedic surgery.      Post-op/Back pain - Patient reports having pain contract for Morphine 15 mg q6h. Will continue  - PRN Tylenol     Eye drainage-  Bilateral eyes with drainage. No visual changes. Start Erythromycin QID for 3 days    CAD/HTN - Patient on Clonidine 0.1 mg daily, Imdur 60 mg BID, Metoprolol 50 mg BID.  Patient on Plavix 75 mg daily. Will monitor   -SBP into 150s, increase Metoprolol     HLD - Patient on Atorvastatin 40 mg QHS     COPD - Not on medications. Will monitor      Anemia - Check AM CBC - 10.3, stable     Leukocytosis - May be reactionary, check AM CBC - 11.8 on 8/7 then normal on 8/8. Will monitor.       Hypothyroidism - Patient on Levothyroxine 25 mcg M-F and 50 mcg Sun-Sat     Depression/Sleep - Patient on Zoloft 50/100 mg      BPH - Patient on Flomax on transfer.      DVT Ppx - Patient has been cleared for Lovenox.     Total time:  36 minutes.  I spent greater than 50% of the time for patient care and coordination on this date, including unit/floor time, and face-to-face time with the patient as per assessment and plan above.  Discussion included admission labs, improved leukocytosis, elevated SBP, increase BB, and eye drainage with Abx.     Romi Singh M.D.

## 2021-08-09 NOTE — CARE PLAN
The patient is Stable - Low risk of patient condition declining or worsening    Shift Goals  Clinical Goals: pain management  Patient Goals: sleep    Progress made toward(s) clinical / shift goals:  pain management  Problem: Incision Care  Goal: Optimal post surgical incision care  Outcome: Progressing  Note: Patient dressing has been dry and intact..      Problem: Pain - Standard  Goal: Alleviation of pain or a reduction in pain to the patient’s comfort goal  Outcome: Progressing  Note: Patient verbalized oxycodone has been helping with pain management.        Patient is not progressing towards the following goals:

## 2021-08-09 NOTE — THERAPY
"Occupational Therapy  Daily Treatment     Patient Name: Antony Akbar  Age:  66 y.o., Sex:  male  Medical Record #: [id number]  Today's Date: 8/9/2021     Precautions  Precautions: Fall Risk, Weight Bearing As Tolerated Left Lower Extremity  Comments: pacemaker         Subjective    \"I was screaming yesterday when I did this, I feel okay today.\" pt reported about toilet xfer's      Objective       08/09/21 0731   Precautions   Precautions Fall Risk;Weight Bearing As Tolerated Left Lower Extremity   Comments pacemaker   Pain   Intervention Distraction;Repositioned   Pain 0 - 10 Group   Location Back;Hip   Location Orientation Lower;Left   Pain Rating Scale (NPRS) 4   Description Aching   Comfort Goal Comfort with Movement;Perform Activity;Sleep Comfortably   Therapist Pain Assessment Post Activity Pain Same as Prior to Activity   Cognition    Level of Consciousness Alert   Functional Level of Assist   Eating Supervision   Eating Description Insert dentures;Increased time   Lower Body Dressing Supervision   Lower Body Dressing Description Reacher;Sock aid;Increased time;Initial preparation for task;Verbal cueing  (doff/don B socks seated EOB; 1 cue for sock aid)   Bed, Chair, Wheelchair Transfer Contact Guard Assist   Bed Chair Wheelchair Transfer Description Adaptive equipment;Increased time;Initial preparation for task;Set-up of equipment  (FWW)   Toilet Transfers Contact Guard Assist   Toilet Transfer Description Adaptive equipment;Grab bar;Increased time;Initial preparation for task;Supervision for safety  (FWW)   Bed Mobility    Supine to Sit Supervised   Scooting Supervised   Interdisciplinary Plan of Care Collaboration   Patient Position at End of Therapy Seated;Self Releasing Lap Belt Applied;Call Light within Reach;Tray Table within Reach;Phone within Reach   OT Total Time Spent   OT Individual Total Time Spent (Mins) 30   OT Charge Group   OT Self Care / ADL 1   OT Therapy Activity 1       Assessment    Pt " tolerated session well. Pt sleeping upon arrival for pleasant and agreeable to therapy, but required increased time to wake up. Increased time taken to discuss pt's CLOF- pt reported pain in back from sleeping on his back- usually a side sleeper. Pt able to complete bed mobility with supervision and STS's with CGA and CGA for ambulation in the room using FWW from bed to bathroom and back. Pt completed LBD doff/don B socks seated at EOB using AE with supervision, only 1 cue needed for proper positioning of sock on sock aid.     Strengths: Able to follow instructions, Alert and oriented, Effective communication skills, Good insight into deficits/needs, Manages pain appropriately, Motivated for self care and independence, Pleasant and cooperative, Supportive family, Willingly participates in therapeutic activities  Barriers: Decreased endurance, Generalized weakness, Limited mobility, Pain    Plan    Continue OT POC with focus on increased standing tolerance, functional mobility/transfers with ADL pursuits, modified ADLs, light IADLs (laundry/simple meal prep), and strengthening to maximize safety and independence with ADL/IADL pursuits.      Passport items to be completed:  Perform bathroom transfers, complete dressing, complete feeding, get ready for the day, prepare a simple meal, participate in household tasks, adapt home for safety needs, demonstrate home exercise program, complete caregiver training        Occupational Therapy Goals (Active)     Problem: Dressing     Dates: Start: 08/07/21       Goal: STG-Within one week, patient will dress LB     Dates: Start: 08/07/21       Description: 1) Individualized Goal:  at a CGA with AE PRN.             Problem: Functional Transfers     Dates: Start: 08/07/21       Goal: STG-Within one week, patient will transfer to toilet     Dates: Start: 08/07/21       Description: 1) Individualized Goal: at a CGA level with use of AD/AE PRN.        Goal: STG-Within one week, patient  will transfer to tub/shower     Dates: Start: 08/07/21       Description: 1) Individualized Goal:  with tub shower bench at a CGA level with AE/AD PRN.           Problem: OT Long Term Goals     Dates: Start: 08/07/21       Goal: LTG-By discharge, patient will complete basic self care tasks     Dates: Start: 08/07/21       Description: 1) Individualized Goal:  at a Min A to Mod I level with use of AE/AD PRN.            Goal: LTG-By discharge, patient will perform bathroom transfers     Dates: Start: 08/07/21       Description: 1) Individualized Goal:  at a Min A to Mod I level with AD/ DME PRN.            Problem: Toileting     Dates: Start: 08/07/21       Goal: STG-Within one week, patient will complete toileting tasks     Dates: Start: 08/07/21       Description: 1) Individualized Goal:  at a SBA level with AE/AD PRN.

## 2021-08-09 NOTE — THERAPY
"Occupational Therapy  Daily Treatment     Patient Name: Antony Akbar  Age:  66 y.o., Sex:  male  Medical Record #: [id number]  Today's Date: 8/9/2021     Precautions  Precautions: Fall Risk, Weight Bearing As Tolerated Left Lower Extremity  Comments: pacemaker         Subjective  \"No, maybe tomorrow morning a shower would be good\"  Pt declined shower task     Objective     08/09/21 1301   Precautions   Precautions Fall Risk;Weight Bearing As Tolerated Left Lower Extremity   Comments pacemaker   Vitals   O2 Delivery Device None - Room Air   Pain   Intervention Declines   Pain 0 - 10 Group   Therapist Pain Assessment 0   Non Verbal Descriptors   Non Verbal Scale  Calm   Cognition    Level of Consciousness Alert   Sleep/Wake Cycle   Sleep & Rest Awake   Sitting Upper Body Exercises   Chest Press 3 sets of 10  (\"Equalizer\" 3x10@20#'s)   Tricep Press 3 sets of 10  (Rickshaw-Forward 3x10@25#'s;[id number]@25#'s)   Upper Extremity Bike Level 2 Resistance  (FluidoBike, Level 2, 15 mins, rest break x 2, 1.182km)   Other Exercise Seated Row @ \"Equalizer\" 3x10@20#'s   Standing Upper Body Exercises   Other Exercises Standing at \"Equalizer\" for Bilateral Row 3x10 @25#'s   Interdisciplinary Plan of Care Collaboration   IDT Collaboration with  Nursing   Patient Position at End of Therapy In Bed;Call Light within Reach;Tray Table within Reach;Phone within Reach   Collaboration Comments CLOF   OT Total Time Spent   OT Individual Total Time Spent (Mins) 60   OT Charge Group   OT Therapeutic Exercise  4         Assessment    Pt was alert and cooperative w/ tx.  Tx emphasis UB strength/endurance + standing tolerance - TherEx seated and standing - see notes above for details  Strengths: Able to follow instructions, Alert and oriented, Effective communication skills, Good insight into deficits/needs, Manages pain appropriately, Motivated for self care and independence, Pleasant and cooperative, Supportive family, Willingly participates in " therapeutic activities  Barriers: Decreased endurance, Generalized weakness, Limited mobility, Pain    Plan    Standing tolerance, functional mobility/transfers with ADL pursuits, modified ADLs, light IADLs (laundry/simple meal prep), and strengthening to maximize safety and independence with ADL/IADL pursuits.     Passport items to be completed:  Passport items to be completed:  Perform bathroom transfers, complete dressing, complete feeding, get ready for the day, prepare a simple meal, participate in household tasks, adapt home for safety needs, demonstrate home exercise program, complete caregiver training     Occupational Therapy Goals (Active)     Problem: Dressing     Dates: Start: 08/07/21       Goal: STG-Within one week, patient will dress LB     Dates: Start: 08/07/21       Description: 1) Individualized Goal:  at a CGA with AE PRN.             Problem: Functional Transfers     Dates: Start: 08/07/21       Goal: STG-Within one week, patient will transfer to toilet     Dates: Start: 08/07/21       Description: 1) Individualized Goal: at a CGA level with use of AD/AE PRN.        Goal: STG-Within one week, patient will transfer to tub/shower     Dates: Start: 08/07/21       Description: 1) Individualized Goal:  with tub shower bench at a CGA level with AE/AD PRN.           Problem: OT Long Term Goals     Dates: Start: 08/07/21       Goal: LTG-By discharge, patient will complete basic self care tasks     Dates: Start: 08/07/21       Description: 1) Individualized Goal:  at a Min A to Mod I level with use of AE/AD PRN.            Goal: LTG-By discharge, patient will perform bathroom transfers     Dates: Start: 08/07/21       Description: 1) Individualized Goal:  at a Min A to Mod I level with AD/ DME PRN.            Problem: Toileting     Dates: Start: 08/07/21       Goal: STG-Within one week, patient will complete toileting tasks     Dates: Start: 08/07/21       Description: 1) Individualized Goal:  at a SBA  level with AE/AD PRN.

## 2021-08-09 NOTE — THERAPY
Physical Therapy   Daily Treatment     Patient Name: Antony Akbar  Age:  66 y.o., Sex:  male  Medical Record #: [id number]  Today's Date: 8/9/2021     Precautions  Precautions: Fall Risk, Weight Bearing As Tolerated Left Lower Extremity  Comments: pacemaker    Subjective    Patient agreeable to PT.  Pt reports red, itchy B eyes since occurrence of fall related to hospital admission; notified RN.     Objective       08/09/21 0931   Vitals   O2 (LPM) 0   O2 Delivery Device None - Room Air   Gait Functional Level of Assist    Gait Level Of Assist Minimal Assist   Assistive Device Front Wheel Walker   Distance (Feet)   (250 ft and 130 ft)   # of Times Distance was Traveled   (per above)   Deviation Bradykinetic;Decreased Heel Strike;Decreased Toe Off;Step To;Antalgic   Wheelchair Functional Level of Assist   Wheelchair Assist Total Assist  (from staff at this time)   Distance Wheelchair (Feet or Distance) 0   Stairs Functional Level of Assist   Level of Assist with Stairs Unable to Participate   # of Stairs Climbed 0   Stairs Description Safety concerns   Sitting Lower Body Exercises   Sitting Lower Body Exercises   (2.5 lbs BLE)   Ankle Pumps 2 sets of 10   Long Arc Quad Left;2 sets of 10  (unable on R 2° previous injury)   Marching   (2x10 on L; additional 1x10 on R instead of LAQ)   Bed Mobility    Sit to Stand Contact Guard Assist   Interdisciplinary Plan of Care Collaboration   Patient Position at End of Therapy Seated;Other (Comments)  (handoff to rec therapy)   PT Total Time Spent   PT Individual Total Time Spent (Mins) 60   PT Charge Group   PT Gait Training 2   PT Therapeutic Exercise 2       Assessment    Unable to ambulate outdoors today due to onset of smoke.  Improving endurance noted with FWW ambulation indoors though.  R knee X still limited per pt report of chronic injury.  Increased rest breaks with activities this session; no LOB.  Strengths: Able to follow instructions, Effective communication skills,  Good insight into deficits/needs, Pleasant and cooperative, Supportive family, Willingly participates in therapeutic activities  Barriers: Decreased endurance, Generalized weakness, Impaired activity tolerance, Impaired balance    Plan    Continue to increase gait tolerance/distance, improve BLE strength, work on dynamic balance exercises, practice ascending/descending a ramp with walker, and practice getting into/out of a car    Passport items to be completed:  Passport items to be completed:  Get in/out of bed safely, in/out of a vehicle, safely use mobility device, walk or wheel around home/community, navigate up and down stairs, show how to get up/down from the ground, ensure home is accessible, demonstrate HEP, complete caregiver training    Physical Therapy Problems (Active)     Problem: Mobility     Dates: Start: 08/07/21       Goal: STG-Within one week, patient will ambulate household distance     Dates: Start: 08/07/21       Goal Note filed on 08/07/21 1608 by CARRINGTON SalgueroT     1) Individualized goal:   >50' with FWW and SBA  2) Interventions:  PT Group Therapy, PT Gait Training, PT Self Care/Home Eval, PT Therapeutic Exercises, PT Neuro Re-Ed/Balance, PT Therapeutic Activity, and PT Evaluation               Problem: Mobility Transfers     Dates: Start: 08/07/21       Goal: STG-Within one week, patient will perform bed mobility     Dates: Start: 08/07/21       Goal Note filed on 08/07/21 1608 by CARRINGTON SalgueroT     1) Individualized goal:   SPV on hospital bed with use of bedcontrol as pt has a hospital bed at home  2) Interventions: PT Group Therapy, PT Gait Training, PT Self Care/Home Eval, PT Therapeutic Exercises, PT Neuro Re-Ed/Balance, PT Therapeutic Activity, and PT Evaluation            Goal: STG-Within one week, patient will transfer bed to chair     Dates: Start: 08/07/21       Goal Note filed on 08/07/21 1608 by Teri Vega DPT     1) Individualized goal:   SPV with FWW or  4WW  2) Interventions: PT Group Therapy, PT Gait Training, PT Self Care/Home Eval, PT Therapeutic Exercises, PT Neuro Re-Ed/Balance, PT Therapeutic Activity, and PT Evaluation               Problem: PT-Long Term Goals     Dates: Start: 08/07/21       Goal: LTG-By discharge, patient will ambulate     Dates: Start: 08/07/21       Goal Note filed on 08/07/21 1608 by Teri Vega DPT     1) Individualized goal:   >55' with FWW or 4WW and Mod I to safely navigate his home environment  2) Interventions: PT Group Therapy, PT Gait Training, PT Self Care/Home Eval, PT Therapeutic Exercises, PT Neuro Re-Ed/Balance, PT Therapeutic Activity, and PT Evaluation            Goal: LTG-By discharge, patient will transfer one surface to another     Dates: Start: 08/07/21       Goal Note filed on 08/07/21 1608 by Teri Vega DPT     1) Individualized goal:   Mod I with FWW or 4WW  2) Interventions:  PT Group Therapy, PT Gait Training, PT Self Care/Home Eval, PT Therapeutic Exercises, PT Neuro Re-Ed/Balance, PT Therapeutic Activity, and PT Evaluation            Goal: LTG-By discharge, patient will transfer in/out of a car     Dates: Start: 08/07/21       Goal Note filed on 08/07/21 1608 by Teri Vega DPT     1) Individualized goal:   SPV from ambulatory level with FWW or 4WW  2) Interventions:  PT Group Therapy, PT Gait Training, PT Self Care/Home Eval, PT Therapeutic Exercises, PT Neuro Re-Ed/Balance, PT Therapeutic Activity, and PT Evaluation            Goal: LTG-By discharge, patient will     Dates: Start: 08/07/21       Goal Note filed on 08/07/21 1608 by Teri Vega DPT     1) Individualized goal:   ascend/descend an ADA ramp with 4WW or FWW and SPV to safely enter/exit his home  2) Interventions:  PT Group Therapy, PT Gait Training, PT Self Care/Home Eval, PT Therapeutic Exercises, PT Neuro Re-Ed/Balance, PT Therapeutic Activity, and PT Evaluation

## 2021-08-09 NOTE — FLOWSHEET NOTE
08/09/21 1609   Events/Summary/Plan   Events/Summary/Plan 02 spot check   Vital Signs   Pulse 72   Respiration 18   Pulse Oximetry 95 %   $ Pulse Oximetry (Spot Check) Yes   Respiratory Assessment   Level of Consciousness Alert   Chest Exam   Work Of Breathing / Effort Within Normal Limits   Oxygen   O2 (LPM)   (2 lpm at night for MARCUS)   O2 Delivery Device None - Room Air

## 2021-08-09 NOTE — THERAPY
Recreational Therapy   Initial Evaluation     Patient Name: Antony Akbar  Age:  66 y.o., Sex:  male  Medical Record #: [id number]  Today's Date: 8/9/2021     Subjective    Pt sharing that he was interested in listening to audio books.     Objective       08/09/21 1031   Leisure History   Leisure Interests Pets;Other (Comments);Reading  (Pt has two dogs)   Pre-Morbid Leisure Lifestyle Individual   Prior Living Arrangements   Lives with - Patient's Self Care Capacity Significant Other   Steps In Home 0   Ambulation Independent   Assistive Devices Used 4-Wheel Walker   Driving / Transportation Relatives / Others Provide Transportation   Functional Ability Status - Cognitive   Attention Span Remains on Task   Comprehension Follows Three Step Commands   Judgment Able to Make Independent Decisions   Functional Ability Status - Emotional    Affect Appropriate   Mood Appropriate   Behavior Appropriate   Leisure Competence Measure   Leisure Awareness Independent   Leisure Attitude Independent   Leisure Skills Moderate Assist   Cultural / Social Behaviors Minimal Assist   Interpersonal Skills Independent   Community Integration Skills Minimal Assist   Social Contact Independent   Community Participation Minimal Assist   Clinical Impression   Clinical Impression Impaired Gross Motor Leisure Functioning;Impaired Community Skills;Impaired Relaxation and Coping Skills;Impaired Leisure Skills   Current Discharge Plan   Current Discharge Plan Return to Prior Living Situation   Benefit    Benefit Patient would Benefit from Inpatient Recreational Therapy to Maximize Independent Leisure Functioning    Interdisciplinary Plan of Care Collaboration   IDT Collaboration with  Physical Therapist   Patient Position at End of Therapy Seated;Tray Table within Reach;Call Light within Reach   Collaboration Comments Pt gave location of Pt    Strengths & Barriers   Strengths Able to follow instructions;Alert and oriented;Motivated for self care and  independence;Pleasant and cooperative;Supportive family;Willingly participates in therapeutic activities   Barriers Decreased endurance;Fatigue;Impaired activity tolerance   Treatment Time   Total Time Spent (mins) 30   Procedural Tracking   Procedural Tracking Community Re-Integration;Community Skills Development;Leisure Skills Awareness;Leisure Skills Development;Gross Motor Functional Leisure Skills       Assessment  Patient is 66 y.o. male with a diagnosis of Closed left hip fracture.  Additional factors influencing patient status / progress (ie: cognitive factors, co-morbidities, social support, etc): past medical history of CAD, COPD, HTN, Hypothyroidism, and back pain.        Plan  Recommend Recreational Therapy 30 minutes per day 2-3 days per week for 2 weeks for the following treatments:  Community Re-Integration, Community Skills Development, Leisure Skills Awareness and Leisure Skills Development    Passport items to be completed:  Passport items to be completed:  Verbalize two positive leisure activities, discuss returning to work, hobbies, community groups or volunteer activities, explore community resources     Goals:  Long term and short term goals have been discussed with patient and they are in agreement.    Recreation Therapy Problems (Active)     Problem: Recreation Therapy     Dates: Start: 08/09/21       Goal: STG-Within one week, patient will demonstrate leisure problem solving by sharing on two positive lesiure activities they would like to participate in either in session or during the free time and any perceived barriers to their participation.     Dates: Start: 08/09/21       Description:          Goal: STG-Within one week, patient will contact the library to set up a library card so that he could listen to audio books.      Dates: Start: 08/09/21          Goal: LTG-By discharge, patient will demonstrate leisure problem solving by sharing on two positive lesiure activities they would like  to participate in following discharge and any perceived barriers to their participation.     Dates: Start: 08/09/21          Goal: LTG-By discharge, patient will attend the adaptive tech class.      Dates: Start: 08/09/21

## 2021-08-09 NOTE — THERAPY
Physical Therapy   Daily Treatment     Patient Name: Antony Akbar  Age:  66 y.o., Sex:  male  Medical Record #: [id number]  Today's Date: 8/9/2021     Precautions  Precautions: Fall Risk, Weight Bearing As Tolerated Left Lower Extremity  Comments: pacemaker    Subjective    Patient agreeable to PT; reports walking earlier and generalized soreness.     Objective       08/09/21 0831   Vitals   O2 (LPM) 0   O2 Delivery Device None - Room Air   Standing Lower Body Exercises   Standing Lower Body Exercises Yes  (Min A, // bars, cueing)   Hamstring Curl 2 sets of 10   Hip Abduction 2 sets of 10   Marching 2 sets of 10   Heel Rise 2 sets of 10   Toe Rise 2 sets of 10   Bed Mobility    Sit to Stand Contact Guard Assist   Interdisciplinary Plan of Care Collaboration   Patient Position at End of Therapy Seated;Chair Alarm On;Self Releasing Lap Belt Applied;Call Light within Reach;Tray Table within Reach;Phone within Reach   PT Total Time Spent   PT Individual Total Time Spent (Mins) 30   PT Charge Group   PT Therapeutic Exercise 2       Assessment    Patient has improving BLE strength but requires A due to balance, soreness, and previous R knee X weakness from chronic injury.  Good motivation and participation with structured tasks.  Strengths: Able to follow instructions, Effective communication skills, Good insight into deficits/needs, Pleasant and cooperative, Supportive family, Willingly participates in therapeutic activities  Barriers: Decreased endurance, Generalized weakness, Impaired activity tolerance, Impaired balance    Plan    Continue to increase gait tolerance/distance, improve BLE strength, work on dynamic balance exercises, practice ascending/descending a ramp with walker, and practice getting into/out of a car    Passport items to be completed:  Passport items to be completed:  Get in/out of bed safely, in/out of a vehicle, safely use mobility device, walk or wheel around home/community, navigate up and down  stairs, show how to get up/down from the ground, ensure home is accessible, demonstrate HEP, complete caregiver training    Physical Therapy Problems (Active)     Problem: Mobility     Dates: Start: 08/07/21       Goal: STG-Within one week, patient will ambulate household distance     Dates: Start: 08/07/21       Goal Note filed on 08/07/21 1608 by Teri Vega DPT     1) Individualized goal:   >50' with FWW and SBA  2) Interventions:  PT Group Therapy, PT Gait Training, PT Self Care/Home Eval, PT Therapeutic Exercises, PT Neuro Re-Ed/Balance, PT Therapeutic Activity, and PT Evaluation               Problem: Mobility Transfers     Dates: Start: 08/07/21       Goal: STG-Within one week, patient will perform bed mobility     Dates: Start: 08/07/21       Goal Note filed on 08/07/21 1608 by Teri Vega DPT     1) Individualized goal:   SPV on hospital bed with use of bedcontrol as pt has a hospital bed at home  2) Interventions: PT Group Therapy, PT Gait Training, PT Self Care/Home Eval, PT Therapeutic Exercises, PT Neuro Re-Ed/Balance, PT Therapeutic Activity, and PT Evaluation            Goal: STG-Within one week, patient will transfer bed to chair     Dates: Start: 08/07/21       Goal Note filed on 08/07/21 1608 by Teri Vega DPT     1) Individualized goal:   SPV with FWW or 4WW  2) Interventions: PT Group Therapy, PT Gait Training, PT Self Care/Home Eval, PT Therapeutic Exercises, PT Neuro Re-Ed/Balance, PT Therapeutic Activity, and PT Evaluation               Problem: PT-Long Term Goals     Dates: Start: 08/07/21       Goal: LTG-By discharge, patient will ambulate     Dates: Start: 08/07/21       Goal Note filed on 08/07/21 1608 by Teri Vega DPT     1) Individualized goal:   >55' with FWW or 4WW and Mod I to safely navigate his home environment  2) Interventions: PT Group Therapy, PT Gait Training, PT Self Care/Home Eval, PT Therapeutic Exercises, PT Neuro Re-Ed/Balance, PT Therapeutic  Activity, and PT Evaluation            Goal: LTG-By discharge, patient will transfer one surface to another     Dates: Start: 08/07/21       Goal Note filed on 08/07/21 1608 by Teri Vega DPT     1) Individualized goal:   Mod I with FWW or 4WW  2) Interventions:  PT Group Therapy, PT Gait Training, PT Self Care/Home Eval, PT Therapeutic Exercises, PT Neuro Re-Ed/Balance, PT Therapeutic Activity, and PT Evaluation            Goal: LTG-By discharge, patient will transfer in/out of a car     Dates: Start: 08/07/21       Goal Note filed on 08/07/21 1608 by Teri Vega DPT     1) Individualized goal:   SPV from ambulatory level with FWW or 4WW  2) Interventions:  PT Group Therapy, PT Gait Training, PT Self Care/Home Eval, PT Therapeutic Exercises, PT Neuro Re-Ed/Balance, PT Therapeutic Activity, and PT Evaluation            Goal: LTG-By discharge, patient will     Dates: Start: 08/07/21       Goal Note filed on 08/07/21 1608 by Teri Vega DPT     1) Individualized goal:   ascend/descend an ADA ramp with 4WW or FWW and SPV to safely enter/exit his home  2) Interventions:  PT Group Therapy, PT Gait Training, PT Self Care/Home Eval, PT Therapeutic Exercises, PT Neuro Re-Ed/Balance, PT Therapeutic Activity, and PT Evaluation

## 2021-08-10 PROBLEM — E55.9 VITAMIN D INSUFFICIENCY: Status: ACTIVE | Noted: 2021-08-10

## 2021-08-10 PROBLEM — R07.9 CHEST PAIN: Status: ACTIVE | Noted: 2021-08-10

## 2021-08-10 LAB
EKG IMPRESSION: NORMAL
TROPONIN T SERPL-MCNC: 16 NG/L (ref 6–19)

## 2021-08-10 PROCEDURE — 700102 HCHG RX REV CODE 250 W/ 637 OVERRIDE(OP): Performed by: HOSPITALIST

## 2021-08-10 PROCEDURE — 99223 1ST HOSP IP/OBS HIGH 75: CPT | Performed by: HOSPITALIST

## 2021-08-10 PROCEDURE — 94760 N-INVAS EAR/PLS OXIMETRY 1: CPT

## 2021-08-10 PROCEDURE — A9270 NON-COVERED ITEM OR SERVICE: HCPCS | Performed by: PHYSICAL MEDICINE & REHABILITATION

## 2021-08-10 PROCEDURE — 97535 SELF CARE MNGMENT TRAINING: CPT

## 2021-08-10 PROCEDURE — 97530 THERAPEUTIC ACTIVITIES: CPT

## 2021-08-10 PROCEDURE — 700111 HCHG RX REV CODE 636 W/ 250 OVERRIDE (IP): Performed by: PHYSICAL MEDICINE & REHABILITATION

## 2021-08-10 PROCEDURE — 97110 THERAPEUTIC EXERCISES: CPT

## 2021-08-10 PROCEDURE — 700102 HCHG RX REV CODE 250 W/ 637 OVERRIDE(OP): Performed by: PHYSICAL MEDICINE & REHABILITATION

## 2021-08-10 PROCEDURE — 93010 ELECTROCARDIOGRAM REPORT: CPT | Performed by: INTERNAL MEDICINE

## 2021-08-10 PROCEDURE — A9270 NON-COVERED ITEM OR SERVICE: HCPCS | Performed by: HOSPITALIST

## 2021-08-10 PROCEDURE — 99233 SBSQ HOSP IP/OBS HIGH 50: CPT | Performed by: PHYSICAL MEDICINE & REHABILITATION

## 2021-08-10 PROCEDURE — 93005 ELECTROCARDIOGRAM TRACING: CPT | Performed by: PHYSICAL MEDICINE & REHABILITATION

## 2021-08-10 PROCEDURE — 97116 GAIT TRAINING THERAPY: CPT

## 2021-08-10 PROCEDURE — 770010 HCHG ROOM/CARE - REHAB SEMI PRIVAT*

## 2021-08-10 PROCEDURE — 84484 ASSAY OF TROPONIN QUANT: CPT

## 2021-08-10 RX ORDER — VITAMIN B COMPLEX
1000 TABLET ORAL DAILY
Status: DISCONTINUED | OUTPATIENT
Start: 2021-08-10 | End: 2021-08-15 | Stop reason: HOSPADM

## 2021-08-10 RX ORDER — NITROGLYCERIN 0.4 MG/1
0.4 TABLET SUBLINGUAL
Status: DISCONTINUED | OUTPATIENT
Start: 2021-08-10 | End: 2021-08-15 | Stop reason: HOSPADM

## 2021-08-10 RX ADMIN — OMEPRAZOLE 20 MG: 20 CAPSULE, DELAYED RELEASE ORAL at 08:59

## 2021-08-10 RX ADMIN — ENOXAPARIN SODIUM 40 MG: 40 INJECTION SUBCUTANEOUS at 09:00

## 2021-08-10 RX ADMIN — SENNOSIDES AND DOCUSATE SODIUM 2 TABLET: 8.6; 5 TABLET ORAL at 08:58

## 2021-08-10 RX ADMIN — CLOPIDOGREL BISULFATE 75 MG: 75 TABLET ORAL at 08:58

## 2021-08-10 RX ADMIN — ATORVASTATIN CALCIUM 40 MG: 40 TABLET, FILM COATED ORAL at 19:41

## 2021-08-10 RX ADMIN — MORPHINE SULFATE 15 MG: 15 TABLET ORAL at 23:43

## 2021-08-10 RX ADMIN — ERYTHROMYCIN: 5 OINTMENT OPHTHALMIC at 17:26

## 2021-08-10 RX ADMIN — ISOSORBIDE MONONITRATE 60 MG: 30 TABLET, EXTENDED RELEASE ORAL at 19:41

## 2021-08-10 RX ADMIN — METOPROLOL TARTRATE 75 MG: 25 TABLET, FILM COATED ORAL at 05:46

## 2021-08-10 RX ADMIN — FAMOTIDINE 40 MG: 20 TABLET ORAL at 19:40

## 2021-08-10 RX ADMIN — Medication 1000 UNITS: at 14:21

## 2021-08-10 RX ADMIN — ACETAMINOPHEN 650 MG: 325 TABLET ORAL at 19:41

## 2021-08-10 RX ADMIN — ERYTHROMYCIN: 5 OINTMENT OPHTHALMIC at 19:45

## 2021-08-10 RX ADMIN — MORPHINE SULFATE 15 MG: 15 TABLET ORAL at 05:46

## 2021-08-10 RX ADMIN — ERYTHROMYCIN: 5 OINTMENT OPHTHALMIC at 09:01

## 2021-08-10 RX ADMIN — METOPROLOL TARTRATE 75 MG: 25 TABLET, FILM COATED ORAL at 17:27

## 2021-08-10 RX ADMIN — SENNOSIDES AND DOCUSATE SODIUM 2 TABLET: 8.6; 5 TABLET ORAL at 19:41

## 2021-08-10 RX ADMIN — ISOSORBIDE MONONITRATE 60 MG: 30 TABLET, EXTENDED RELEASE ORAL at 08:59

## 2021-08-10 RX ADMIN — MORPHINE SULFATE 15 MG: 15 TABLET ORAL at 11:17

## 2021-08-10 RX ADMIN — ACETAMINOPHEN 650 MG: 325 TABLET ORAL at 14:20

## 2021-08-10 RX ADMIN — NITROGLYCERIN 0.4 MG: 0.4 TABLET SUBLINGUAL at 10:20

## 2021-08-10 RX ADMIN — TAMSULOSIN HYDROCHLORIDE 0.4 MG: 0.4 CAPSULE ORAL at 19:41

## 2021-08-10 RX ADMIN — LEVOTHYROXINE SODIUM 25 MCG: 25 TABLET ORAL at 05:45

## 2021-08-10 RX ADMIN — MORPHINE SULFATE 15 MG: 15 TABLET ORAL at 17:27

## 2021-08-10 RX ADMIN — CLONIDINE HYDROCHLORIDE 0.1 MG: 0.1 TABLET ORAL at 08:59

## 2021-08-10 RX ADMIN — NITROGLYCERIN 0.4 MG: 0.4 TABLET SUBLINGUAL at 10:40

## 2021-08-10 RX ADMIN — SERTRALINE HYDROCHLORIDE 100 MG: 50 TABLET ORAL at 19:41

## 2021-08-10 RX ADMIN — TRAZODONE HYDROCHLORIDE 50 MG: 50 TABLET ORAL at 23:43

## 2021-08-10 RX ADMIN — SERTRALINE HYDROCHLORIDE 50 MG: 50 TABLET ORAL at 08:59

## 2021-08-10 RX ADMIN — ERYTHROMYCIN: 5 OINTMENT OPHTHALMIC at 14:19

## 2021-08-10 RX ADMIN — ACETAMINOPHEN 650 MG: 325 TABLET ORAL at 08:58

## 2021-08-10 ASSESSMENT — ACTIVITIES OF DAILY LIVING (ADL)
BED_CHAIR_WHEELCHAIR_TRANSFER_DESCRIPTION: INCREASED TIME;INITIAL PREPARATION FOR TASK;SQUAT PIVOT TRANSFER TO WHEELCHAIR;SUPERVISION FOR SAFETY
BED_CHAIR_WHEELCHAIR_TRANSFER_DESCRIPTION: ADAPTIVE EQUIPMENT;INCREASED TIME;SET-UP OF EQUIPMENT;SUPERVISION FOR SAFETY
TOILETING_LEVEL_OF_ASSIST_DESCRIPTION: GRAB BAR;INCREASED TIME;SUPERVISION FOR SAFETY
BED_CHAIR_WHEELCHAIR_TRANSFER_DESCRIPTION: ADAPTIVE EQUIPMENT;INCREASED TIME;SET-UP OF EQUIPMENT;SUPERVISION FOR SAFETY;VERBAL CUEING
TOILET_TRANSFER_DESCRIPTION: GRAB BAR;INCREASED TIME;INITIAL PREPARATION FOR TASK;SUPERVISION FOR SAFETY;SET-UP OF EQUIPMENT

## 2021-08-10 ASSESSMENT — GAIT ASSESSMENTS
ASSISTIVE DEVICE: FRONT WHEEL WALKER
GAIT LEVEL OF ASSIST: STAND BY ASSIST

## 2021-08-10 ASSESSMENT — PAIN DESCRIPTION - PAIN TYPE: TYPE: ACUTE PAIN

## 2021-08-10 NOTE — FLOWSHEET NOTE
08/10/21 1032   Events/Summary/Plan   Events/Summary/Plan 02 spot check, EKG   Vital Signs   Pulse 72   Respiration 20   Pulse Oximetry 97 %   $ Pulse Oximetry (Spot Check) Yes   Respiratory Assessment   Level of Consciousness Alert   Chest Exam   Work Of Breathing / Effort Within Normal Limits   Oxygen   O2 (LPM)   (2 lpm at night for sleep apnea)   O2 Delivery Device Room air w/o2 available

## 2021-08-10 NOTE — CONSULTS
DATE OF SERVICE:  8/10/2021    REQUESTING PHYSICIAN:  Christian Singh MD    CHIEF COMPLAINT / REASON FOR CONSULTATION:   Hypertension  Chest pain    HISTORY OF PRESENT ILLNESS:  This is a 65 y/o male with a PMH significant for CAD with hx of CABG and 6 cardiac stents, hypertension, COPD, hypothyroidism, and back pain who presented on 8/4/21 after sustaining a mechanical ground level fall.  He presented to outside clinic in [place] and found to have left hip fracture.  Patient reportedly is limited in ambulation due to back pain and he tripped over his dog when walking with his walker.  Orthopedics was consulted and recommended surgical intervention.  Patient underwent left intramedullary device with Dr. Ríos on 8/4/21.  Patient also had right wrist pain from the fall.  XR of his right wrist were negative for fracture.      Because of the patient's weakness and debility, Rehab was consulted, evaluated the patient, and was deemed a good Rehab candidate.  The patient was transferred over to the Rehab facility on 8/6/2021.      The patient denies fever, vomiting, headaches, or blurry vision.    The patient does have complaints of chest pain this am    REVIEW OF SYSTEMS: All review of systems are negative pre AMA and CMS criteria except for that stated in the HPI.    PAST MEDICAL HISTORY:  Past Medical History:   Diagnosis Date   • Asthma    • Back pain    • BPH (benign prostatic hyperplasia)    • Chronic obstructive pulmonary disease (HCC)    • Hypertension    • Hypothyroid        PAST SURGICAL HISTORY:  Past Surgical History:   Procedure Laterality Date   • PB OPEN FIX INTER/SUBTROCH FX,IMPLNT Left 8/4/2021    Procedure: INSERTION, INTRAMEDULLARY MIKAEL, FEMUR, PROXIMAL;  Surgeon: Valentin Ríos M.D.;  Location: SURGERY [facility];  Service: Orthopedics   • CARDIAC CATH, RIGHT/LEFT HEART         Allergies   Allergen Reactions   • Bee Venom Anaphylaxis       CURRENT MEDICATIONS:    Current Facility-Administered  Medications:   •  nitroglycerin  •  erythromycin  •  metoprolol tartrate  •  melatonin  •  Respiratory Therapy Consult  •  Pharmacy Consult Request  •  oxyCODONE immediate-release **OR** oxyCODONE immediate-release **OR** [DISCONTINUED] HYDROmorphone  •  hydrALAZINE  •  acetaminophen  •  senna-docusate **AND** polyethylene glycol/lytes **AND** magnesium hydroxide **AND** bisacodyl  •  artificial tears  •  benzocaine-menthol  •  mag hydrox-al hydrox-simeth  •  ondansetron **OR** ondansetron  •  traZODone  •  sodium chloride  •  acetaminophen  •  albuterol  •  atorvastatin  •  butalbital/apap/caffeine -40 mg  •  cloNIDine  •  clopidogrel  •  enoxaparin (LOVENOX) injection  •  famotidine  •  isosorbide mononitrate SR  •  levothyroxine  •  levothyroxine  •  omeprazole  •  sertraline  •  sertraline  •  tamsulosin  •  morphine    Social History     Socioeconomic History   • Marital status: Single     Spouse name: Not on file   • Number of children: Not on file   • Years of education: Not on file   • Highest education level: Not on file   Occupational History   • Not on file   Tobacco Use   • Smoking status: Former Smoker   • Smokeless tobacco: Never Used   Vaping Use   • Vaping Use: Never used   Substance and Sexual Activity   • Alcohol use: Not Currently   • Drug use: Not Currently   • Sexual activity: Not on file   Other Topics Concern   • Not on file   Social History Narrative   • Not on file     Social Determinants of Health     Financial Resource Strain:    • Difficulty of Paying Living Expenses:    Food Insecurity:    • Worried About Running Out of Food in the Last Year:    • Ran Out of Food in the Last Year:    Transportation Needs:    • Lack of Transportation (Medical):    • Lack of Transportation (Non-Medical):    Physical Activity:    • Days of Exercise per Week:    • Minutes of Exercise per Session:    Stress:    • Feeling of Stress :    Social Connections:    • Frequency of Communication with Friends and  Family:    • Frequency of Social Gatherings with Friends and Family:    • Attends Jewish Services:    • Active Member of Clubs or Organizations:    • Attends Club or Organization Meetings:    • Marital Status:    Intimate Partner Violence:    • Fear of Current or Ex-Partner:    • Emotionally Abused:    • Physically Abused:    • Sexually Abused:        FAMILY HISTORY:  was reviewed and is not pertinent to this consultation.    PHYSICAL EXAMINATION:  VITAL SIGNS:  Temp is 98.6, blood pressure is 1455/80, heart rate is 75, respiratory rate is 16.  GENERAL:  Patient was lying in bed in no distress.  HEENT:  Pupils were equal, round and reactive to light and accomodation.  Oral mucosa was pink and moist.  NECK:  Soft.  Supple.  No JVD.  HEART:  Regular rate and rhythm.  Normal S1 and S2.  No murmurs were appreciated.  LUNGS:  Are clear to auscultation bilaterally.  ABDOMEN:  Soft, non tender, non distended.  Bowels sound were positive in all four quadrants.  EXTREMITIES:  No clubbing, cyanosis.  There was no lower extremity edema.  NEUROLOGIC:  Cranial nerves two through twelve were grossly intact.    LABS:  Lab Results   Component Value Date/Time    SODIUM 140 08/07/2021 05:23 AM    POTASSIUM 3.6 08/07/2021 05:23 AM    CHLORIDE 104 08/07/2021 05:23 AM    CO2 22 08/07/2021 05:23 AM    GLUCOSE 101 (H) 08/07/2021 05:23 AM    BUN 11 08/07/2021 05:23 AM    CREATININE 0.78 08/07/2021 05:23 AM      Lab Results   Component Value Date/Time    WBC 10.1 08/08/2021 05:32 AM    RBC 3.74 (L) 08/08/2021 05:32 AM    HEMOGLOBIN 10.3 (L) 08/08/2021 05:32 AM    HEMATOCRIT 32.4 (L) 08/08/2021 05:32 AM    MCV 86.6 08/08/2021 05:32 AM    MCH 27.5 08/08/2021 05:32 AM    MCHC 31.8 (L) 08/08/2021 05:32 AM    MPV 10.9 08/08/2021 05:32 AM    NEUTSPOLYS 61.30 08/08/2021 05:32 AM    LYMPHOCYTES 22.90 08/08/2021 05:32 AM    MONOCYTES 8.40 08/08/2021 05:32 AM    EOSINOPHILS 5.80 08/08/2021 05:32 AM    BASOPHILS 0.80 08/08/2021 05:32 AM      No  results found for: PROTHROMBTM, INR       Closed left hip fracture (HCC)  S/P surgical repair (8/4)    CAD (coronary artery disease) of artery bypass graft  Has Hx of CABG x 2 vessels  Has hx of 6 cardiac stents  On Lopressor  On Imdur  On Plavix  On Lipitor    Essential hypertension  BP a little elevated and labile  On Lopressor -- dose increased recently  On Clonidine  Note: on Flomax  Will monitor another day since meds were recently adjusted    Hypothyroidism  TSH wnl (8/7)  On Synthroid    Chest pain  Developed CP this am: located substernally, no radiation, pressure like, lasted 30 minutes  Has assoc of nausea, chills & sweats, shakes  Relieved with SL Nitro  On Nitro prn  On Imdur  EKG: no changes from prior  Will check Trop  Note: has hx of angina     Vitamin D insufficiency  Vit D: 28  Will start supplements      This case has been discussed with the attending Physiatrist.    Thank you for the consultation.  Will follow the patient with you.

## 2021-08-10 NOTE — PROGRESS NOTES
Patient complaining of chest tightness states he has a hx of chest pain and has a prescription for nitroglycerin sublingual. Patient states he has chest pain / tightness about x 2 a week and has good results with Nitroglycerin. Charge Nurse Courtney is aware and notified Dr Singh. EKG performed and v/s taken, prn nitroglycerin sublingual was given, will continue to monitor.

## 2021-08-10 NOTE — PROGRESS NOTES
"Rehab Progress Note     Encounter Date: 8/10/2021    CC: decreased mobility, decreased endurance    Interval Events (Subjective)  Patient sitting up in room. He reports he is having infrequent chest pain. He reports he has angina \"all of the time.\" He reports he keeps nitro tabs with him everywhere including in his car.  Discussed would check EKG and consult Hospitalist. He is in agreement. He reports needing a break from therapy. Discussed medical hold.  Denies SOB.     IDT Team Meeting 8/10/2021    IRomi M.D., was present and led the interdisciplinary team conference on 8/10/2021.  I led the IDT conference and agree with the IDT conference documentation and plan of care as noted below.     RN:  Diet Regular   % Meal     Pain    Sleep    Bowel Continent   Bladder Continent   In's & Out's      PT:  Bed Mobility    Transfers CGA   Mobility CGA   Stairs    Right knee extension limited due to injury to patella     OT:  Eating    Grooming    Bathing CGA   UB Dressing    LB Dressing    Toileting CGA   Shower & Transfer CGA     CM:  Continues to work on disposition and DME needs.      DC/Disposition:  8/17/21    Objective:  VITAL SIGNS: /70   Pulse 70   Temp 37 °C (98.6 °F) (Oral)   Resp 16 Comment: Simultaneous filing. User may not have seen previous data.  Ht 1.707 m (5' 7.2\")   Wt 84.5 kg (186 lb 4.6 oz)   SpO2 92%   BMI 29.00 kg/m²   Gen: NAD  Psych: Mood and affect appropriate  CV: RRR, no edema  Resp: CTAB, no upper airway sounds  Abd: NTND  Neuro: AOx4, following commands, vision intact with weeping from both eyes  Unchanged from 8/9/21    Recent Results (from the past 72 hour(s))   CBC WITH DIFFERENTIAL    Collection Time: 08/08/21  5:32 AM   Result Value Ref Range    WBC 10.1 4.8 - 10.8 K/uL    RBC 3.74 (L) 4.70 - 6.10 M/uL    Hemoglobin 10.3 (L) 14.0 - 18.0 g/dL    Hematocrit 32.4 (L) 42.0 - 52.0 %    MCV 86.6 81.4 - 97.8 fL    MCH 27.5 27.0 - 33.0 pg    MCHC 31.8 (L) 33.7 - " 35.3 g/dL    RDW 44.0 35.9 - 50.0 fL    Platelet Count 169 164 - 446 K/uL    MPV 10.9 9.0 - 12.9 fL    Neutrophils-Polys 61.30 44.00 - 72.00 %    Lymphocytes 22.90 22.00 - 41.00 %    Monocytes 8.40 0.00 - 13.40 %    Eosinophils 5.80 0.00 - 6.90 %    Basophils 0.80 0.00 - 1.80 %    Immature Granulocytes 0.80 0.00 - 0.90 %    Nucleated RBC 0.00 /100 WBC    Neutrophils (Absolute) 6.19 1.82 - 7.42 K/uL    Lymphs (Absolute) 2.31 1.00 - 4.80 K/uL    Monos (Absolute) 0.85 0.00 - 0.85 K/uL    Eos (Absolute) 0.59 (H) 0.00 - 0.51 K/uL    Baso (Absolute) 0.08 0.00 - 0.12 K/uL    Immature Granulocytes (abs) 0.08 0.00 - 0.11 K/uL    NRBC (Absolute) 0.00 K/uL   EKG    Collection Time: 08/10/21 10:23 AM   Result Value Ref Range    Report       [facility]    Test Date:  2021-08-10  Pt Name:    MARCOS LOGAN                 Department: [facility]  MRN:        [id number]                      Room:       [facility]  Gender:     Male                         Technician: GIO  :        [id number]                   Requested By:DAVIE BECK  Order #:    [id number]                    Reading MD:    Measurements  Intervals                                Axis  Rate:       72                           P:  NV:         184                          QRS:        27  QRSD:       110                          T:          76  QT:         416  QTc:        456    Interpretive Statements  ATRIAL-PACED RHYTHM  INCOMPLETE RIGHT BUNDLE BRANCH BLOCK  BASELINE WANDER IN LEAD(S) V5  Compared to ECG 2021 17:28:55  Sinus rhythm no longer present  Myocardial infarct finding no longer present         Current Facility-Administered Medications   Medication Frequency   • nitroglycerin (NITROSTAT) tablet 0.4 mg Q5 MIN PRN   • vitamin D (cholecalciferol) tablet 1,000 Units DAILY   • erythromycin ophthalmic ointment 4X/DAY   • metoprolol tartrate (LOPRESSOR) tablet 75 mg BID   • melatonin tablet 3 mg QHS PRN   • Respiratory Therapy Consult Continuous RT   •  Pharmacy Consult Request ...Pain Management Review 1 Each PHARMACY TO DOSE   • oxyCODONE immediate-release (ROXICODONE) tablet 5 mg Q3HRS PRN    Or   • oxyCODONE immediate release (ROXICODONE) tablet 10 mg Q3HRS PRN   • hydrALAZINE (APRESOLINE) tablet 25 mg Q8HRS PRN   • acetaminophen (Tylenol) tablet 650 mg Q4HRS PRN   • senna-docusate (PERICOLACE or SENOKOT S) 8.6-50 MG per tablet 2 tablet BID    And   • polyethylene glycol/lytes (MIRALAX) PACKET 1 Packet QDAY PRN    And   • magnesium hydroxide (MILK OF MAGNESIA) suspension 30 mL QDAY PRN    And   • bisacodyl (DULCOLAX) suppository 10 mg QDAY PRN   • artificial tears ophthalmic solution 1 Drop PRN   • benzocaine-menthol (CEPACOL) lozenge 1 Lozenge Q2HRS PRN   • mag hydrox-al hydrox-simeth (MAALOX PLUS ES or MYLANTA DS) suspension 20 mL Q2HRS PRN   • ondansetron (ZOFRAN ODT) dispertab 4 mg 4X/DAY PRN    Or   • ondansetron (ZOFRAN) syringe/vial injection 4 mg 4X/DAY PRN   • traZODone (DESYREL) tablet 50 mg QHS PRN   • sodium chloride (OCEAN) 0.65 % nasal spray 2 Spray PRN   • acetaminophen (Tylenol) tablet 650 mg TID   • albuterol inhaler 2 Puff Q6HRS PRN   • atorvastatin (LIPITOR) tablet 40 mg Nightly   • butalbital/apap/caffeine -40 mg (Fioricet) per tablet 1 tablet Q4HRS PRN   • cloNIDine (CATAPRES) tablet 0.1 mg QAM   • clopidogrel (PLAVIX) tablet 75 mg DAILY   • enoxaparin (LOVENOX) inj 40 mg DAILY   • famotidine (PEPCID) tablet 40 mg Q EVENING   • isosorbide mononitrate SR (IMDUR) tablet 60 mg BID   • levothyroxine (SYNTHROID) tablet 25 mcg Once per day on Mon Tue Wed Thu Fri   • levothyroxine (SYNTHROID) tablet 50 mcg Once per day on Sun Sat   • omeprazole (PRILOSEC) capsule 20 mg QAM   • sertraline (Zoloft) tablet 100 mg Q EVENING   • sertraline (Zoloft) tablet 50 mg QAM   • tamsulosin (FLOMAX) capsule 0.4 mg Q EVENING   • morphine (MS IR) tablet 15 mg Q6HRS       Orders Placed This Encounter   Procedures   • Diet Order Diet: Regular     Standing  Status:   Standing     Number of Occurrences:   1     Order Specific Question:   Diet:     Answer:   Regular [1]       Assessment:  Active Hospital Problems    Diagnosis    • *Closed left hip fracture (HCC)    • CAD (coronary artery disease) of artery bypass graft    • COPD (chronic obstructive pulmonary disease) (HCC)    • Essential hypertension    • GERD (gastroesophageal reflux disease)    • Hypothyroidism    • Leukocytosis        Medical Decision Making and Plan:  Left hip fracture - GLF on 8/4/21 due to dog found to have left hip fracture. Patient is now s/p intramedullary device with Dr. Ríos on 8/4/21. WBAT. Has some numbness to left leg, will monitor.   -PT and OT for mobility and ADLs  -Follow-up Orthopedic surgery.      Post-op/Back pain - Patient reports having pain contract for Morphine 15 mg q6h. Will continue  - PRN Tylenol     Eye drainage-  Bilateral eyes with drainage. No visual changes. Start Erythromycin QID for 3 days    CAD/HTN - Patient on Clonidine 0.1 mg daily, Imdur 60 mg BID, Metoprolol 50 mg BID.  Patient on Plavix 75 mg daily. Will monitor   -SBP into 150s, increase Metoprolol    Chest pain - Patient with diagnosis of angina and has nitro tabs.  Check EKG and consult hospitalist     HLD - Patient on Atorvastatin 40 mg QHS     COPD - Not on medications. Will monitor      Anemia - Check AM CBC - 10.3, stable     Leukocytosis - May be reactionary, check AM CBC - 11.8 on 8/7 then normal on 8/8. Will monitor.      Hypothyroidism - Patient on Levothyroxine 25 mcg M-F and 50 mcg Sun-Sat     Depression/Sleep - Patient on Zoloft 50/100 mg      BPH - Patient on Flomax on transfer.      DVT Ppx - Patient has been cleared for Lovenox.     Total time:  36 minutes.  I spent greater than 50% of the time for patient care, counseling, and coordination on this date, including unit/floor time, and face-to-face time with the patient as per interval events and assessment and plan above. Topics discussed  included discharge planning, chest pain, nitro tabs, EKG - wnl, and consult hospitalist. Patient was discussed separately in IDT today; please see details above.    Romi Singh M.D.

## 2021-08-10 NOTE — ASSESSMENT & PLAN NOTE
Has Hx of CABG x 2 vessels  Has hx of 6 cardiac stents  On Lopressor  On Lisinopril  On Imdur  On Plavix  On Lipitor

## 2021-08-10 NOTE — THERAPY
Missed Therapy     Patient Name: Antony Akbar  Age:  66 y.o., Sex:  male  Medical Record #: [id number]  Today's Date: 8/10/2021       08/10/21 1001   Precautions   Precautions Fall Risk;Weight Bearing As Tolerated Left Lower Extremity   Comments pacemaker   Vitals   Pulse 75   Patient BP Position Sitting   Blood Pressure (!) 164/95   Interdisciplinary Plan of Care Collaboration   Patient Position at End of Therapy In Bed;Call Light within Reach;Tray Table within Reach;Phone within Reach;Other (Comments)  (nursing present )   Therapy Missed   Missed Therapy (Minutes) 30   Reason For Missed Therapy Medical - Patient on Hold from Therapy;Medical - Patient Is Not Medically Stable  (angina symptoms )     Pt reported having angina symptoms prior to therapy. Nursing present upon arrival. Pt assisted back into bed due to hot/cold feeling, fatigue, shaking and beginning of chest tightness. Dr. Singh issued a hold for therapy at this time.

## 2021-08-10 NOTE — THERAPY
Missed Therapy     Patient Name: Antony Akbar  Age:  66 y.o., Sex:  male  Medical Record #: [id number]  Today's Date: 8/10/2021    Discussed missed therapy with Occupational Therapy, therapy , Dr. Singh.       08/10/21 1031   Interdisciplinary Plan of Care Collaboration   IDT Collaboration with  Occupational Therapist   Collaboration Comments re: therapy hold, per Dr. Singh   Therapy Missed   Missed Therapy (Minutes) 30   Reason For Missed Therapy Medical - Patient on Hold from Therapy  (Symptoms of angina)

## 2021-08-10 NOTE — PROGRESS NOTES
Patient care assumed. Report received from [facility] JHOAN Aquino. Patient is alert and calm, resting in bed. Call light and bedside table within reach. Will continue to monitor.

## 2021-08-10 NOTE — THERAPY
Physical Therapy   Daily Treatment     Patient Name: Antony Akbar  Age:  66 y.o., Sex:  male  Medical Record #: [id number]  Today's Date: 8/10/2021     Precautions  Precautions: Fall Risk, Weight Bearing As Tolerated Left Lower Extremity  Comments: pacemaker    Subjective    Patient agreeable to PT.  Reports increased fatigue in BUE and chest at end of therapy day yesterday; discussed activity pacing and pt's goals.     Objective       08/10/21 0831   Vitals   O2 (LPM) 0   O2 Delivery Device None - Room Air   Gait Functional Level of Assist    Gait Level Of Assist Stand by Assist   Assistive Device Front Wheel Walker  (with gait belt donned)   Distance (Feet)   (300 ft and 250 ft indoors)   # of Times Distance was Traveled   (per above)   Deviation Bradykinetic;Decreased Heel Strike;Decreased Toe Off;Step To;Antalgic  (limited R knee AROM due to chronic injury at R patella)   Wheelchair Functional Level of Assist   Wheelchair Assist   (n/a)   Distance Wheelchair (Feet or Distance)   (n/a)   Wheelchair Description   (n/a)   Stairs Functional Level of Assist   Level of Assist with Stairs   (pt politely declines; reports has ramp in front of home)   # of Stairs Climbed 0   Stairs Description   (pt politely declines; reports has ramp in front of home)   Transfer Functional Level of Assist   Bed, Chair, Wheelchair Transfer Contact Guard Assist  (SBA-CGA)   Bed Chair Wheelchair Transfer Description Adaptive equipment;Increased time;Set-up of equipment;Supervision for safety   Sitting Lower Body Exercises   Sitting Lower Body Exercises   (2.5 lbs BLE)   Ankle Pumps 1 set of 15   Long Arc Quad 1 set of 10;Left   Marching 2 sets of 10   Bed Mobility    Supine to Sit Supervised   Sit to Supine   (not observed)   Sit to Stand Contact Guard Assist   Scooting Supervised   Rolling Modified Independent   Interdisciplinary Plan of Care Collaboration   Patient Position at End of Therapy Seated;Chair Alarm On;Self Releasing Lap Belt  Applied;Call Light within Reach;Tray Table within Reach;Phone within Reach   PT Total Time Spent   PT Individual Total Time Spent (Mins) 60   PT Charge Group   PT Gait Training 2   PT Therapeutic Exercise 1   PT Therapeutic Activities 1     Car transfer with FWW and gait belt at SBA-CGA level, cueing prn to reinforce pt's proper problem solving, increased time.    Assessment    Patient has good cognition and good insight into deficits; great motivation; improving ambulation endurance and improving activity pacing with prolonged rest breaks appropriately; no increased antalgia noted this session; unable to ambulate outdoors second day in a row due to smoke levels outdoors.    Strengths: Able to follow instructions, Effective communication skills, Good insight into deficits/needs, Pleasant and cooperative, Supportive family, Willingly participates in therapeutic activities  Barriers: Decreased endurance, Generalized weakness, Impaired activity tolerance, Impaired balance    Plan    FWW ambulation, introduce ramps and outdoors as weather allows; BLE strengthening and endurance (limited chronic R knee X); improve activity pacing; standing balance; and safety with decreased A for transfers.    Passport items to be completed:  Passport items to be completed:  Get in/out of bed safely, in/out of a vehicle, safely use mobility device, walk or wheel around home/community, navigate up and down stairs, show how to get up/down from the ground, ensure home is accessible, demonstrate HEP, complete caregiver training    Physical Therapy Problems (Active)     Problem: Mobility     Dates: Start: 08/10/21       Goal: STG-Within one week, patient will ambulate community distances with FWW or 4WW and Mod I to safely navigate his home environment     Dates: Start: 08/10/21          Goal: STG-Within one week, patient will ascend/descend an ADA ramp with 4WW or FWW and SPV to safely enter/exit his home     Dates: Start: 08/10/21              Problem: Mobility Transfers     Dates: Start: 08/07/21       Goal: STG-Within one week, patient will transfer bed to chair     Dates: Start: 08/07/21       Goal Note filed on 08/10/21 1116 by Shyam Hollins, PT     Patient currently uses CGA-SBA with FWW for transfers instead of SPV.            Goal: STG-Within one week, patient will transfer in/out of car SPV from ambulatory level with FWW or 4WW     Dates: Start: 08/10/21             Problem: PT-Long Term Goals     Dates: Start: 08/07/21       Goal: LTG-By discharge, patient will ambulate     Dates: Start: 08/07/21       Goal Note filed on 08/07/21 1608 by Teri Vega DPT     1) Individualized goal:   >55' with FWW or 4WW and Mod I to safely navigate his home environment  2) Interventions: PT Group Therapy, PT Gait Training, PT Self Care/Home Eval, PT Therapeutic Exercises, PT Neuro Re-Ed/Balance, PT Therapeutic Activity, and PT Evaluation            Goal: LTG-By discharge, patient will transfer one surface to another     Dates: Start: 08/07/21       Goal Note filed on 08/07/21 1608 by Teri Vega DPT     1) Individualized goal:   Mod I with FWW or 4WW  2) Interventions:  PT Group Therapy, PT Gait Training, PT Self Care/Home Eval, PT Therapeutic Exercises, PT Neuro Re-Ed/Balance, PT Therapeutic Activity, and PT Evaluation            Goal: LTG-By discharge, patient will transfer in/out of a car     Dates: Start: 08/07/21       Goal Note filed on 08/07/21 1608 by Teri Vega DPT     1) Individualized goal:   SPV from ambulatory level with FWW or 4WW  2) Interventions:  PT Group Therapy, PT Gait Training, PT Self Care/Home Eval, PT Therapeutic Exercises, PT Neuro Re-Ed/Balance, PT Therapeutic Activity, and PT Evaluation            Goal: LTG-By discharge, patient will     Dates: Start: 08/07/21       Goal Note filed on 08/07/21 1608 by Teri Vega DPT     1) Individualized goal:   ascend/descend an ADA ramp with 4WW or FWW and SPV to  safely enter/exit his home  2) Interventions:  PT Group Therapy, PT Gait Training, PT Self Care/Home Eval, PT Therapeutic Exercises, PT Neuro Re-Ed/Balance, PT Therapeutic Activity, and PT Evaluation

## 2021-08-10 NOTE — THERAPY
Physical Therapy   Daily Treatment     Patient Name: Antony Akbar  Age:  66 y.o., Sex:  male  Medical Record #: [id number]  Today's Date: 8/10/2021     Precautions  Precautions: Fall Risk, Weight Bearing As Tolerated Left Lower Extremity  Comments: pacemaker    Subjective    Patient agreeable to PT.     Objective       08/10/21 1501   Vitals   O2 (LPM) 0   O2 Delivery Device None - Room Air   Wheelchair Functional Level of Assist   Wheelchair Assist Supervised   Distance Wheelchair (Feet or Distance) 200 ft indoors without fatigue noted   Wheelchair Description Extra time;Verbal cueing   Transfer Functional Level of Assist   Bed, Chair, Wheelchair Transfer Contact Guard Assist   Bed Chair Wheelchair Transfer Description Adaptive equipment;Increased time;Set-up of equipment;Supervision for safety;Verbal cueing   Sitting Lower Body Exercises   Sitting Lower Body Exercises Yes   Nustep Resistance Level 1  (3:53, rest, 12:45; RUE assisting RLE; 0.26 miles total)   Bed Mobility    Sit to Stand Contact Guard Assist   Scooting Stand by Assist   Interdisciplinary Plan of Care Collaboration   Patient Position at End of Therapy Seated;Chair Alarm On;Self Releasing Lap Belt Applied;Call Light within Reach;Tray Table within Reach;Phone within Reach   PT Total Time Spent   PT Individual Total Time Spent (Mins) 30   PT Charge Group   PT Therapeutic Exercise 1   PT Therapeutic Activities 1       Assessment    Patient has improving endurance; good cognition and motivation; self-A for RLE deficits.    Strengths: Able to follow instructions, Effective communication skills, Good insight into deficits/needs, Pleasant and cooperative, Supportive family, Willingly participates in therapeutic activities  Barriers: Decreased endurance, Generalized weakness, Impaired activity tolerance, Impaired balance    Plan    FWW ambulation, introduce ramps and outdoors as weather allows; BLE strengthening and endurance (limited chronic R knee X); improve  activity pacing; standing balance; and safety with decreased A for transfers.    Passport items to be completed:  Passport items to be completed:  Get in/out of bed safely, in/out of a vehicle, safely use mobility device, walk or wheel around home/community, navigate up and down stairs, show how to get up/down from the ground, ensure home is accessible, demonstrate HEP, complete caregiver training    Physical Therapy Problems (Active)     Problem: Mobility     Dates: Start: 08/10/21       Goal: STG-Within one week, patient will ambulate community distances with FWW or 4WW and Mod I to safely navigate his home environment     Dates: Start: 08/10/21          Goal: STG-Within one week, patient will ascend/descend an ADA ramp with 4WW or FWW and SPV to safely enter/exit his home     Dates: Start: 08/10/21             Problem: Mobility Transfers     Dates: Start: 08/07/21       Goal: STG-Within one week, patient will transfer bed to chair     Dates: Start: 08/07/21       Goal Note filed on 08/10/21 1116 by Shyam Hollins, PT     Patient currently uses CGA-SBA with FWW for transfers instead of SPV.            Goal: STG-Within one week, patient will transfer in/out of car SPV from ambulatory level with FWW or 4WW     Dates: Start: 08/10/21             Problem: PT-Long Term Goals     Dates: Start: 08/07/21       Goal: LTG-By discharge, patient will ambulate     Dates: Start: 08/07/21       Goal Note filed on 08/07/21 1608 by Teri Vega DPT     1) Individualized goal:   >55' with FWW or 4WW and Mod I to safely navigate his home environment  2) Interventions: PT Group Therapy, PT Gait Training, PT Self Care/Home Eval, PT Therapeutic Exercises, PT Neuro Re-Ed/Balance, PT Therapeutic Activity, and PT Evaluation            Goal: LTG-By discharge, patient will transfer one surface to another     Dates: Start: 08/07/21       Goal Note filed on 08/07/21 1608 by Teri Vega DPT     1) Individualized goal:   Mod I  with FWW or 4WW  2) Interventions:  PT Group Therapy, PT Gait Training, PT Self Care/Home Eval, PT Therapeutic Exercises, PT Neuro Re-Ed/Balance, PT Therapeutic Activity, and PT Evaluation            Goal: LTG-By discharge, patient will transfer in/out of a car     Dates: Start: 08/07/21       Goal Note filed on 08/07/21 1608 by Teri Vega DPT     1) Individualized goal:   SPV from ambulatory level with FWW or 4WW  2) Interventions:  PT Group Therapy, PT Gait Training, PT Self Care/Home Eval, PT Therapeutic Exercises, PT Neuro Re-Ed/Balance, PT Therapeutic Activity, and PT Evaluation            Goal: LTG-By discharge, patient will     Dates: Start: 08/07/21       Goal Note filed on 08/07/21 1608 by Teri Vega DPT     1) Individualized goal:   ascend/descend an ADA ramp with 4WW or FWW and SPV to safely enter/exit his home  2) Interventions:  PT Group Therapy, PT Gait Training, PT Self Care/Home Eval, PT Therapeutic Exercises, PT Neuro Re-Ed/Balance, PT Therapeutic Activity, and PT Evaluation

## 2021-08-10 NOTE — ASSESSMENT & PLAN NOTE
Has hx of angina and occ CP  Developed CP on 8/10: located substernally, no radiation, pressure like, lasted 30 minutes  Had assoc of nausea, chills & sweats, shakes  Relieved with SL Nitro  On Nitro prn  On Imdur  EKG: no changes from prior  Trop 16

## 2021-08-10 NOTE — CARE PLAN
Problem: Knowledge Deficit - Standard  Goal: Patient and family/care givers will demonstrate understanding of plan of care, disease process/condition, diagnostic tests and medications  Outcome: Progressing   Pt education given regarding plan of care, pt shows understanding, will continue to reinforce education and continue to monitor.   Problem: Fall Risk - Rehab  Goal: Patient will remain free from falls  Outcome: Progressing     Pt education given regarding fall precautions AND safety measures, pt shows good understanding, has not attempted to self transfer this shift, will continue to reinforce education and continue to monitor.

## 2021-08-10 NOTE — CARE PLAN
Problem: Dressing  Goal: STG-Within one week, patient will dress LB  Description: 1) Individualized Goal:  at a CGA with AE PRN.     Outcome: Not Met     Problem: Functional Transfers  Goal: STG-Within one week, patient will transfer to tub/shower  Description: 1) Individualized Goal:  with tub shower bench at a CGA level with AE/AD PRN.   Outcome: Not Met     Problem: Toileting  Goal: STG-Within one week, patient will complete toileting tasks  Description: 1) Individualized Goal:  at a SBA level with AE/AD PRN.   Outcome: Met     Problem: Functional Transfers  Goal: STG-Within one week, patient will transfer to toilet  Description: 1) Individualized Goal: at a CGA level with use of AD/AE PRN.   Outcome: Met

## 2021-08-10 NOTE — CARE PLAN
"The patient is Stable - Low risk of patient condition declining or worsening    Problem: Fall Risk - Rehab  Goal: Patient will remain free from falls  Outcome: Progressing  Note: Corie Rm Fall risk Assessment Score: 15    High fall risk Interventions   - Alarming seatbelt  - Wander guard  - Bed and strip alarm   - Yellow sign by the door   - Yellow wrist band \"Fall risk\"  - Room near to the nurse station  - Do not leave patient unattended in the bathroom  - Fall risk education provided        Problem: Pain - Standard  Goal: Alleviation of pain or a reduction in pain to the patient’s comfort goal  Outcome: Progressing  Flowsheets  Taken 8/10/2021 0056  OB Pain Intervention: Medication - See MAR  Taken 8/9/2021 2300  Pain Rating Scale (NPRS): 7  Taken 8/9/2021 2000  Non Verbal Scale:   Calm   Unlabored Breathing     Shift Goals  Clinical Goals: pain management  Patient Goals: sleep      "

## 2021-08-10 NOTE — THERAPY
"Occupational Therapy  Daily Treatment     Patient Name: Antony Akbar  Age:  66 y.o., Sex:  male  Medical Record #: [id number]  Today's Date: 8/10/2021     Precautions  Precautions: Fall Risk, Weight Bearing As Tolerated Left Lower Extremity  Comments: pacemaker         Subjective    \"I feel much better now.\"      Objective       08/10/21 1301   Precautions   Precautions Fall Risk;Weight Bearing As Tolerated Left Lower Extremity   Comments pacemaker   Pain   Intervention Distraction   Pain 0 - 10 Group   Location Back;Neck   Pain Rating Scale (NPRS) 3   Description Aching   Comfort Goal Comfort with Movement;Perform Activity;Sleep Comfortably   Therapist Pain Assessment During Activity  (grooming tasks)   Cognition    Level of Consciousness Alert   Functional Level of Assist   Grooming Stand by Assist;Seated   Grooming Description Increased time;Initial preparation for task;Seated in wheelchair at sink;Verbal cueing   Toileting Stand by Assist   Toileting Description Grab bar;Increased time;Supervision for safety   Bed, Chair, Wheelchair Transfer Contact Guard Assist   Bed Chair Wheelchair Transfer Description Increased time;Initial preparation for task;Squat pivot transfer to wheelchair;Supervision for safety   Toilet Transfers Contact Guard Assist   Toilet Transfer Description Grab bar;Increased time;Initial preparation for task;Supervision for safety;Set-up of equipment   Bed Mobility    Supine to Sit Supervised  (HOB raised to 30 degrees and use of bed rail)   Scooting Supervised   Interdisciplinary Plan of Care Collaboration   Patient Position at End of Therapy Seated;Chair Alarm On;Self Releasing Lap Belt Applied;Call Light within Reach;Other (Comments)  (CNA notified that pt in bathroom at sink side)   OT Total Time Spent   OT Individual Total Time Spent (Mins) 30   OT Charge Group   OT Self Care / ADL 2       Assessment    Pt tolerated session well. Pt very pleasant and agreeable to therapy. Pt reported feeling " much better than this morning and has been resting since this morning, but was unable to eat any lunch. Pt completed toileting tasks from w/c level with CGA-SBA and no reports of pain with use of grab bar. Pt stating he might have his family install grab bars in his bathroom at home. Pt completed grooming seated at sink side to brush hair and shave. Pt noted to have decreased shoulder ROM s/p 10+ years- would have assist from s.o. with brushing hair at home. Pt demonstrated ability to brush his own hair and put into a ponytail with SBA and cues for arm positioning- issued pt long handled brush to use while in the facility to assist with grooming- pt simulated using it and reported decreased pain in neck during hair brushing due to not having to flex neck forward. Pt able to complete shaving at sink side with supervision using electric razor first and then straight razor. Pt reported that he often gets back pain when standing at sink at home- educated pt on possibly getting a stool or chair in bathroom to sit on.     Strengths: Able to follow instructions, Alert and oriented, Effective communication skills, Good insight into deficits/needs, Manages pain appropriately, Motivated for self care and independence, Pleasant and cooperative, Supportive family, Willingly participates in therapeutic activities  Barriers: Decreased endurance, Generalized weakness, Limited mobility, Pain    Plan    Standing tolerance, functional mobility/transfers with ADL pursuits, modified ADLs, light IADLs (laundry/simple meal prep), and strengthening to maximize safety and independence with ADL/IADL pursuits.      Passport items to be completed:  Perform bathroom transfers, complete dressing, complete feeding, get ready for the day, prepare a simple meal, participate in household tasks, adapt home for safety needs, demonstrate home exercise program, complete caregiver training    Occupational Therapy Goals (Active)     Problem: Dressing      Dates: Start: 08/07/21       Goal: STG-Within one week, patient will dress LB     Dates: Start: 08/07/21       Description: 1) Individualized Goal:  at a CGA with AE PRN.             Problem: Functional Transfers     Dates: Start: 08/07/21       Goal: STG-Within one week, patient will transfer to tub/shower     Dates: Start: 08/07/21       Description: 1) Individualized Goal:  with tub shower bench at a CGA level with AE/AD PRN.           Problem: OT Long Term Goals     Dates: Start: 08/07/21       Goal: LTG-By discharge, patient will complete basic self care tasks     Dates: Start: 08/07/21       Description: 1) Individualized Goal:  at a Min A to Mod I level with use of AE/AD PRN.            Goal: LTG-By discharge, patient will perform bathroom transfers     Dates: Start: 08/07/21       Description: 1) Individualized Goal:  at a Min A to Mod I level with AD/ DME PRN.

## 2021-08-10 NOTE — CARE PLAN
The patient is Watcher - Medium risk of patient condition declining or worsening    Shift Goals  Clinical Goals: pain management  Patient Goals: sleep    Problem: Skin Integrity  Goal: Skin integrity is maintained or improved  Outcome: Progressing Patient's skin remains intact and free from new or accidental injury this shift.  Will continue to monitor.      Problem: Pain - Standard  Goal: Alleviation of pain or a reduction in pain to the patient’s comfort goal  Outcome: Progressing Patient able to verbalize pain level and verbalize an acceptable level of pain.

## 2021-08-10 NOTE — CARE PLAN
Problem: Mobility  Goal: STG-Within one week, patient will ambulate household distance  Outcome: Met     Problem: Mobility Transfers  Goal: STG-Within one week, patient will perform bed mobility  Outcome: Met  Goal: STG-Within one week, patient will transfer bed to chair  Outcome: Not Met  Note: Patient currently uses CGA-SBA with FWW for transfers instead of SPV.

## 2021-08-10 NOTE — THERAPY
Recreational Therapy  Daily Treatment     Patient Name: Antony Akbar  AGE:  66 y.o., SEX:  male  Medical Record #: [id number]  Today's Date: 8/10/2021       Subjective    Pt reporting that he was having anxiety this morning. Pt was asked if he would like to participate in a new positive leisure activity.      Objective       08/10/21 1431   Functional Ability Status - Cognitive   Attention Span Remains on Task   Comprehension Follows Three Step Commands   Judgment Able to Make Independent Decisions   Functional Ability Status - Emotional    Affect Appropriate;Bright   Mood Appropriate   Behavior Appropriate   Skilled Intervention    Skilled Intervention Relaxation / Coping Skills   Skilled Intervention Comments Sarah he   Interdisciplinary Plan of Care Collaboration   IDT Collaboration with  Physical Therapist   Patient Position at End of Therapy Seated   Collaboration Comments PT was informed  of the location of the Pt in the main gym.    Strengths & Barriers   Strengths Able to follow instructions;Alert and oriented;Motivated for self care and independence;Pleasant and cooperative;Supportive family;Willingly participates in therapeutic activities   Barriers Fatigue;Decreased endurance;Impaired activity tolerance   Treatment Time   Total Time Spent (mins) 30   Procedural Tracking   Procedural Tracking Community Re-Integration;Community Skills Development;Leisure Skills Awareness;Leisure Skills Development       Assessment    Positive leisure activity used as a positive coping skill.     Strengths: (P) Able to follow instructions, Alert and oriented, Motivated for self care and independence, Pleasant and cooperative, Supportive family, Willingly participates in therapeutic activities  Barriers: (P) Fatigue, Decreased endurance, Impaired activity tolerance    Plan    Adaptive tech class and contacting the library for audio books.     Passport items to be completed:  Passport items to be completed:  Verbalize two  positive leisure activities, discuss returning to work, hobbies, community groups or volunteer activities, explore community resources

## 2021-08-10 NOTE — DISCHARGE PLANNING
Spoke with patient to update after IDT conference and provide DC date of 8/16/2021.  Care Chest application to be provided to patient for tub transfer bench and FWW.  Patient would like to make his own follow-up appointments to work around transportation issues.  Patient would like to DC on 8/15/21 to allow for easier transportation home - advised that CM would discuss with MD.

## 2021-08-11 PROCEDURE — 700101 HCHG RX REV CODE 250: Performed by: PHYSICAL MEDICINE & REHABILITATION

## 2021-08-11 PROCEDURE — 770010 HCHG ROOM/CARE - REHAB SEMI PRIVAT*

## 2021-08-11 PROCEDURE — 700102 HCHG RX REV CODE 250 W/ 637 OVERRIDE(OP): Performed by: PHYSICAL MEDICINE & REHABILITATION

## 2021-08-11 PROCEDURE — 97530 THERAPEUTIC ACTIVITIES: CPT | Mod: CQ

## 2021-08-11 PROCEDURE — A9270 NON-COVERED ITEM OR SERVICE: HCPCS | Performed by: HOSPITALIST

## 2021-08-11 PROCEDURE — 94760 N-INVAS EAR/PLS OXIMETRY 1: CPT

## 2021-08-11 PROCEDURE — 97110 THERAPEUTIC EXERCISES: CPT

## 2021-08-11 PROCEDURE — 700111 HCHG RX REV CODE 636 W/ 250 OVERRIDE (IP): Performed by: PHYSICAL MEDICINE & REHABILITATION

## 2021-08-11 PROCEDURE — 700102 HCHG RX REV CODE 250 W/ 637 OVERRIDE(OP): Performed by: HOSPITALIST

## 2021-08-11 PROCEDURE — 99232 SBSQ HOSP IP/OBS MODERATE 35: CPT | Performed by: HOSPITALIST

## 2021-08-11 PROCEDURE — 99232 SBSQ HOSP IP/OBS MODERATE 35: CPT | Performed by: PHYSICAL MEDICINE & REHABILITATION

## 2021-08-11 PROCEDURE — 97116 GAIT TRAINING THERAPY: CPT

## 2021-08-11 PROCEDURE — A9270 NON-COVERED ITEM OR SERVICE: HCPCS | Performed by: PHYSICAL MEDICINE & REHABILITATION

## 2021-08-11 PROCEDURE — 97535 SELF CARE MNGMENT TRAINING: CPT

## 2021-08-11 RX ORDER — LISINOPRIL 20 MG/1
40 TABLET ORAL
Status: DISCONTINUED | OUTPATIENT
Start: 2021-08-12 | End: 2021-08-15 | Stop reason: HOSPADM

## 2021-08-11 RX ORDER — LISINOPRIL 20 MG/1
20 TABLET ORAL
Status: DISCONTINUED | OUTPATIENT
Start: 2021-08-11 | End: 2021-08-11

## 2021-08-11 RX ADMIN — MORPHINE SULFATE 15 MG: 15 TABLET ORAL at 23:39

## 2021-08-11 RX ADMIN — MORPHINE SULFATE 15 MG: 15 TABLET ORAL at 18:25

## 2021-08-11 RX ADMIN — LEVOTHYROXINE SODIUM 25 MCG: 25 TABLET ORAL at 05:55

## 2021-08-11 RX ADMIN — METOPROLOL TARTRATE 75 MG: 25 TABLET, FILM COATED ORAL at 18:25

## 2021-08-11 RX ADMIN — MORPHINE SULFATE 15 MG: 15 TABLET ORAL at 11:58

## 2021-08-11 RX ADMIN — FAMOTIDINE 40 MG: 20 TABLET ORAL at 20:41

## 2021-08-11 RX ADMIN — MORPHINE SULFATE 15 MG: 15 TABLET ORAL at 05:55

## 2021-08-11 RX ADMIN — ENOXAPARIN SODIUM 40 MG: 40 INJECTION SUBCUTANEOUS at 08:44

## 2021-08-11 RX ADMIN — NITROGLYCERIN 0.4 MG: 0.4 TABLET SUBLINGUAL at 08:41

## 2021-08-11 RX ADMIN — Medication 1000 UNITS: at 08:42

## 2021-08-11 RX ADMIN — TRAZODONE HYDROCHLORIDE 50 MG: 50 TABLET ORAL at 23:39

## 2021-08-11 RX ADMIN — OMEPRAZOLE 20 MG: 20 CAPSULE, DELAYED RELEASE ORAL at 08:43

## 2021-08-11 RX ADMIN — METOPROLOL TARTRATE 75 MG: 25 TABLET, FILM COATED ORAL at 05:55

## 2021-08-11 RX ADMIN — LISINOPRIL 20 MG: 20 TABLET ORAL at 10:58

## 2021-08-11 RX ADMIN — CLOPIDOGREL BISULFATE 75 MG: 75 TABLET ORAL at 08:43

## 2021-08-11 RX ADMIN — ACETAMINOPHEN 650 MG: 325 TABLET ORAL at 08:43

## 2021-08-11 RX ADMIN — ISOSORBIDE MONONITRATE 60 MG: 30 TABLET, EXTENDED RELEASE ORAL at 08:42

## 2021-08-11 RX ADMIN — ERYTHROMYCIN: 5 OINTMENT OPHTHALMIC at 20:45

## 2021-08-11 RX ADMIN — SERTRALINE HYDROCHLORIDE 100 MG: 50 TABLET ORAL at 20:44

## 2021-08-11 RX ADMIN — ERYTHROMYCIN: 5 OINTMENT OPHTHALMIC at 18:26

## 2021-08-11 RX ADMIN — ERYTHROMYCIN: 5 OINTMENT OPHTHALMIC at 08:42

## 2021-08-11 RX ADMIN — SERTRALINE HYDROCHLORIDE 50 MG: 50 TABLET ORAL at 08:43

## 2021-08-11 RX ADMIN — ATORVASTATIN CALCIUM 40 MG: 40 TABLET, FILM COATED ORAL at 20:41

## 2021-08-11 RX ADMIN — CLONIDINE HYDROCHLORIDE 0.1 MG: 0.1 TABLET ORAL at 08:43

## 2021-08-11 RX ADMIN — ACETAMINOPHEN 650 MG: 325 TABLET ORAL at 20:41

## 2021-08-11 RX ADMIN — OXYCODONE HYDROCHLORIDE 10 MG: 10 TABLET ORAL at 14:59

## 2021-08-11 RX ADMIN — ERYTHROMYCIN: 5 OINTMENT OPHTHALMIC at 11:59

## 2021-08-11 RX ADMIN — TAMSULOSIN HYDROCHLORIDE 0.4 MG: 0.4 CAPSULE ORAL at 20:41

## 2021-08-11 RX ADMIN — SENNOSIDES AND DOCUSATE SODIUM 2 TABLET: 8.6; 5 TABLET ORAL at 08:43

## 2021-08-11 RX ADMIN — ACETAMINOPHEN 650 MG: 325 TABLET ORAL at 14:59

## 2021-08-11 RX ADMIN — ISOSORBIDE MONONITRATE 60 MG: 30 TABLET, EXTENDED RELEASE ORAL at 20:41

## 2021-08-11 ASSESSMENT — ENCOUNTER SYMPTOMS
VOMITING: 0
PALPITATIONS: 0
DIZZINESS: 0
HALLUCINATIONS: 0
HEADACHES: 0
FEVER: 0
BLURRED VISION: 0
NAUSEA: 0
SHORTNESS OF BREATH: 0

## 2021-08-11 ASSESSMENT — GAIT ASSESSMENTS
ASSISTIVE DEVICE: FRONT WHEEL WALKER
GAIT LEVEL OF ASSIST: STAND BY ASSIST
DEVIATION: BRADYKINETIC;ANTALGIC
DISTANCE (FEET): 175
GAIT LEVEL OF ASSIST: STAND BY ASSIST
DISTANCE (FEET): 150
DEVIATION: BRADYKINETIC;ANTALGIC;DECREASED HEEL STRIKE;DECREASED TOE OFF
ASSISTIVE DEVICE: FRONT WHEEL WALKER

## 2021-08-11 ASSESSMENT — ACTIVITIES OF DAILY LIVING (ADL)
TOILET_TRANSFER_DESCRIPTION: GRAB BAR;SUPERVISION FOR SAFETY
TOILETING_LEVEL_OF_ASSIST_DESCRIPTION: GRAB BAR;SUPERVISION FOR SAFETY
TUB_SHOWER_TRANSFER_DESCRIPTION: SHOWER BENCH

## 2021-08-11 NOTE — THERAPY
"Physical Therapy   Daily Treatment     Patient Name: Antony Akbar  Age:  66 y.o., Sex:  male  Medical Record #: [id number]  Today's Date: 8/11/2021     Precautions  Precautions: Fall Risk, Weight Bearing As Tolerated Left Lower Extremity  Comments: pacemaker    Subjective    Pt greeted in bed, he was agreeable to PT session.    \"that is a little high on my blood pressure, I just need to take extra time to get up\"     Objective       08/11/21 0701   Vitals   Pulse 71  (71-77)   Patient BP Position Standing 1 minute  (sitting 162/101)   Blood Pressure 154/84   Room Air Oximetry 98   O2 (LPM) 0   O2 Delivery Device None - Room Air   Vitals Comments taken in seated and standing, RN notified, this is WNL for the patient   Pain 0 - 10 Group   Location Hip   Location Orientation Left   Description Sore   Comfort Goal Comfort with Movement;Perform Activity   Therapist Pain Assessment Post Activity   Gait Functional Level of Assist    Gait Level Of Assist Stand by Assist   Assistive Device Front Wheel Walker   Distance (Feet) 175   # of Times Distance was Traveled 2   Deviation Bradykinetic;Antalgic;Decreased Heel Strike;Decreased Toe Off   Transfer Functional Level of Assist   Bed, Chair, Wheelchair Transfer Stand by Assist   Bed Chair Wheelchair Transfer Description Adaptive equipment;Supervision for safety;Set-up of equipment;Increased time   Sitting Lower Body Exercises   Sitting Lower Body Exercises Yes   Nustep Resistance Level 2;Time (See Comments)  (10' B LE/UE)   Bed Mobility    Supine to Sit Supervised   Sit to Stand Stand by Assist   Scooting Stand by Assist   Interdisciplinary Plan of Care Collaboration   IDT Collaboration with  Nursing;Certified Nursing Assistant   Patient Position at End of Therapy Seated;Self Releasing Lap Belt Applied;Call Light within Reach   Collaboration Comments CNA: took vitals, RN: notified of patients BP   PT Total Time Spent   PT Individual Total Time Spent (Mins) 60   PT Charge Group "   PT Gait Training 1   PT Therapeutic Exercise 1   PT Therapeutic Activities 2   Supervising Physical Therapist Jhoan Hollins     Reviewed passport items and discussed patient goals.   Assessment    Pt tolerated tx session well with focus on LE strength and mobility. Pt is able to amb with SBA and FWW on indoor level surfaces. Required additional time to mobilize to EOB and wc due to increased bp, per patient request. Per RN the elevated bp in WNL for the patient.   Strengths: Able to follow instructions, Effective communication skills, Good insight into deficits/needs, Pleasant and cooperative, Supportive family, Willingly participates in therapeutic activities  Barriers: Decreased endurance, Generalized weakness, Impaired activity tolerance, Impaired balance    Plan    FWW ambulation, introduce ramps and outdoors as weather allows; BLE strengthening and endurance (limited chronic R knee X); improve activity pacing; standing balance; and safety with decreased A for transfers.     Passport items to be completed:  Passport items to be completed:  Get in/out of bed safely, in/out of a vehicle, safely use mobility device, walk or wheel around home/community, navigate up and down stairs, show how to get up/down from the ground, ensure home is accessible, demonstrate HEP, complete caregiver training    Physical Therapy Problems (Active)     Problem: Mobility     Dates: Start: 08/10/21       Goal: STG-Within one week, patient will ambulate community distances with FWW or 4WW and Mod I to safely navigate his home environment     Dates: Start: 08/10/21          Goal: STG-Within one week, patient will ascend/descend an ADA ramp with 4WW or FWW and SPV to safely enter/exit his home     Dates: Start: 08/10/21             Problem: Mobility Transfers     Dates: Start: 08/07/21       Goal: STG-Within one week, patient will transfer bed to chair     Dates: Start: 08/07/21       Goal Note filed on 08/10/21 1116 by Shyam NEWMAN  Gurvinder, PT     Patient currently uses CGA-SBA with FWW for transfers instead of SPV.            Goal: STG-Within one week, patient will transfer in/out of car SPV from ambulatory level with FWW or 4WW     Dates: Start: 08/10/21             Problem: PT-Long Term Goals     Dates: Start: 08/07/21       Goal: LTG-By discharge, patient will ambulate     Dates: Start: 08/07/21       Goal Note filed on 08/07/21 1608 by Teri Vega DPT     1) Individualized goal:   >55' with FWW or 4WW and Mod I to safely navigate his home environment  2) Interventions: PT Group Therapy, PT Gait Training, PT Self Care/Home Eval, PT Therapeutic Exercises, PT Neuro Re-Ed/Balance, PT Therapeutic Activity, and PT Evaluation            Goal: LTG-By discharge, patient will transfer one surface to another     Dates: Start: 08/07/21       Goal Note filed on 08/07/21 1608 by Teri Vega DPT     1) Individualized goal:   Mod I with FWW or 4WW  2) Interventions:  PT Group Therapy, PT Gait Training, PT Self Care/Home Eval, PT Therapeutic Exercises, PT Neuro Re-Ed/Balance, PT Therapeutic Activity, and PT Evaluation            Goal: LTG-By discharge, patient will transfer in/out of a car     Dates: Start: 08/07/21       Goal Note filed on 08/07/21 1608 by Teri Vega DPT     1) Individualized goal:   SPV from ambulatory level with FWW or 4WW  2) Interventions:  PT Group Therapy, PT Gait Training, PT Self Care/Home Eval, PT Therapeutic Exercises, PT Neuro Re-Ed/Balance, PT Therapeutic Activity, and PT Evaluation            Goal: LTG-By discharge, patient will     Dates: Start: 08/07/21       Goal Note filed on 08/07/21 1608 by Teri Vega DPT     1) Individualized goal:   ascend/descend an ADA ramp with 4WW or FWW and SPV to safely enter/exit his home  2) Interventions:  PT Group Therapy, PT Gait Training, PT Self Care/Home Eval, PT Therapeutic Exercises, PT Neuro Re-Ed/Balance, PT Therapeutic Activity, and PT Evaluation

## 2021-08-11 NOTE — PROGRESS NOTES
[facility] Medicine Daily Progress Note    Date of Service  8/11/2021    Chief Complaint:  Chest pain  Hypertension    Interval History:  No significant events or changes since last visit    Review of Systems  Review of Systems   Constitutional: Negative for fever.   Eyes: Negative for blurred vision.   Respiratory: Negative for shortness of breath.    Cardiovascular: Negative for palpitations.   Gastrointestinal: Negative for nausea and vomiting.   Neurological: Negative for dizziness and headaches.   Psychiatric/Behavioral: Negative for hallucinations.        Physical Exam  Temp:  [36.2 °C (97.1 °F)-37 °C (98.6 °F)] 36.4 °C (97.5 °F)  Pulse:  [69-78] 72  Resp:  [14-18] 14  BP: (130-164)/(70-95) 144/95  SpO2:  [91 %-98 %] 98 %    Physical Exam  Vitals and nursing note reviewed.   Constitutional:       General: He is not in acute distress.  HENT:      Mouth/Throat:      Mouth: Mucous membranes are moist.      Pharynx: Oropharynx is clear.   Eyes:      General: No scleral icterus.  Cardiovascular:      Rate and Rhythm: Normal rate and regular rhythm.      Heart sounds: No murmur heard.     Pulmonary:      Effort: Pulmonary effort is normal.      Breath sounds: Normal breath sounds. No stridor.   Abdominal:      General: There is no distension.      Palpations: Abdomen is soft.      Tenderness: There is no abdominal tenderness.   Musculoskeletal:      Cervical back: No rigidity.      Right lower leg: No edema.      Left lower leg: No edema.   Skin:     General: Skin is warm and dry.      Findings: No rash.   Neurological:      Mental Status: He is alert and oriented to person, place, and time.   Psychiatric:         Mood and Affect: Mood normal.         Behavior: Behavior normal.         Fluids    Intake/Output Summary (Last 24 hours) at 8/11/2021 0926  Last data filed at 8/11/2021 0358  Gross per 24 hour   Intake 480 ml   Output 150 ml   Net 330 ml       Laboratory                        Imaging      Assessment/Plan  *  Closed left hip fracture (HCC)- (present on admission)  Assessment & Plan  S/P surgical repair (8/4)    Vitamin D insufficiency  Assessment & Plan  Vit D: 28  On supplements    Chest pain  Assessment & Plan  Developed CP this am: located substernally, no radiation, pressure like, lasted 30 minutes  Has assoc of nausea, chills & sweats, shakes  Relieved with SL Nitro  On Nitro prn  On Imdur  EKG: no changes from prior  Trop 16  Note: has hx of angina     CAD (coronary artery disease) of artery bypass graft- (present on admission)  Assessment & Plan  Has Hx of CABG x 2 vessels  Has hx of 6 cardiac stents  On Lopressor  On Imdur  On Plavix  On Lipitor  Note: will be starting Lisinopril    Essential hypertension- (present on admission)  Assessment & Plan  BP still a little elevated   On Lopressor -- dose increased recently  On Clonidine qam --> will d/c  Will start Lisinopril  Note: on Flomax  Cont to monitor    Hypothyroidism- (present on admission)  Assessment & Plan  TSH wnl (8/7)  On Synthroid

## 2021-08-11 NOTE — PROGRESS NOTES
"Rehab Progress Note     Encounter Date: 2021    CC: decreased mobility, decreased endurance    Interval Events (Subjective)  Patient sitting up in room. He reports therapy is going well. He reports he only has a ride available on . Discussed with CM and able to move discharge to . Discussed they only have outpatient therapy in his area.  Will need PT most likely.  Denies NVD. Denies SOB. Ongoing elevated SBP    IDT Team Meeting 8/10/2021  DC/Disposition:  21 -> 8/15/21     Objective:  VITAL SIGNS: /88   Pulse 72   Temp 36.4 °C (97.5 °F) (Oral)   Resp 14   Ht 1.707 m (5' 7.2\")   Wt 84.5 kg (186 lb 4.6 oz)   SpO2 98%   BMI 29.00 kg/m²   Gen: NAD  Psych: Mood and affect appropriate  CV: RRR, no edema  Resp: CTAB, no upper airway sounds  Abd: NTND  Neuro: AOx4, following commands    Recent Results (from the past 72 hour(s))   EKG    Collection Time: 08/10/21 10:23 AM   Result Value Ref Range    Report       [facility]    Test Date:  2021-08-10  Pt Name:    MARCOS LOGAN                 Department: [facility]  MRN:        [id number]                      Room:       [facility]  Gender:     Male                         Technician: GIO  :        1955                   Requested By:DAVIE BECK  Order #:    [id number]                    Reading MD: Shyam Baez MD    Measurements  Intervals                                Axis  Rate:       72                           P:  HI:         184                          QRS:        27  QRSD:       110                          T:          76  QT:         416  QTc:        456    Interpretive Statements  ATRIAL-PACED RHYTHM  INCOMPLETE RIGHT BUNDLE BRANCH BLOCK  BASELINE WANDER IN LEAD(S) V5  Compared to ECG 2021 17:28:55  Sinus rhythm no longer present  Myocardial infarct finding no longer present  Electronically Signed On 8- 15:53:53 PDT by Shyam Baez MD     TROPONIN    Collection Time: 08/10/21 11:32 AM   Result " Value Ref Range    Troponin T 16 6 - 19 ng/L       Current Facility-Administered Medications   Medication Frequency   • lisinopril (PRINIVIL) tablet 20 mg Q DAY   • nitroglycerin (NITROSTAT) tablet 0.4 mg Q5 MIN PRN   • vitamin D (cholecalciferol) tablet 1,000 Units DAILY   • erythromycin ophthalmic ointment 4X/DAY   • metoprolol tartrate (LOPRESSOR) tablet 75 mg BID   • melatonin tablet 3 mg QHS PRN   • Respiratory Therapy Consult Continuous RT   • Pharmacy Consult Request ...Pain Management Review 1 Each PHARMACY TO DOSE   • oxyCODONE immediate-release (ROXICODONE) tablet 5 mg Q3HRS PRN    Or   • oxyCODONE immediate release (ROXICODONE) tablet 10 mg Q3HRS PRN   • hydrALAZINE (APRESOLINE) tablet 25 mg Q8HRS PRN   • acetaminophen (Tylenol) tablet 650 mg Q4HRS PRN   • senna-docusate (PERICOLACE or SENOKOT S) 8.6-50 MG per tablet 2 tablet BID    And   • polyethylene glycol/lytes (MIRALAX) PACKET 1 Packet QDAY PRN    And   • magnesium hydroxide (MILK OF MAGNESIA) suspension 30 mL QDAY PRN    And   • bisacodyl (DULCOLAX) suppository 10 mg QDAY PRN   • artificial tears ophthalmic solution 1 Drop PRN   • benzocaine-menthol (CEPACOL) lozenge 1 Lozenge Q2HRS PRN   • mag hydrox-al hydrox-simeth (MAALOX PLUS ES or MYLANTA DS) suspension 20 mL Q2HRS PRN   • ondansetron (ZOFRAN ODT) dispertab 4 mg 4X/DAY PRN    Or   • ondansetron (ZOFRAN) syringe/vial injection 4 mg 4X/DAY PRN   • traZODone (DESYREL) tablet 50 mg QHS PRN   • sodium chloride (OCEAN) 0.65 % nasal spray 2 Spray PRN   • acetaminophen (Tylenol) tablet 650 mg TID   • albuterol inhaler 2 Puff Q6HRS PRN   • atorvastatin (LIPITOR) tablet 40 mg Nightly   • butalbital/apap/caffeine -40 mg (Fioricet) per tablet 1 tablet Q4HRS PRN   • clopidogrel (PLAVIX) tablet 75 mg DAILY   • enoxaparin (LOVENOX) inj 40 mg DAILY   • famotidine (PEPCID) tablet 40 mg Q EVENING   • isosorbide mononitrate SR (IMDUR) tablet 60 mg BID   • levothyroxine (SYNTHROID) tablet 25 mcg Once per  day on Mon Tue Wed Thu Fri   • levothyroxine (SYNTHROID) tablet 50 mcg Once per day on Sun Sat   • omeprazole (PRILOSEC) capsule 20 mg QAM   • sertraline (Zoloft) tablet 100 mg Q EVENING   • sertraline (Zoloft) tablet 50 mg QAM   • tamsulosin (FLOMAX) capsule 0.4 mg Q EVENING   • morphine (MS IR) tablet 15 mg Q6HRS       Orders Placed This Encounter   Procedures   • Diet Order Diet: Regular     Standing Status:   Standing     Number of Occurrences:   1     Order Specific Question:   Diet:     Answer:   Regular [1]       Assessment:  Active Hospital Problems    Diagnosis    • *Closed left hip fracture (HCC)    • CAD (coronary artery disease) of artery bypass graft    • COPD (chronic obstructive pulmonary disease) (HCC)    • Essential hypertension    • GERD (gastroesophageal reflux disease)    • Hypothyroidism    • Leukocytosis        Medical Decision Making and Plan:  Left hip fracture - GLF on 8/4/21 due to dog found to have left hip fracture. Patient is now s/p intramedullary device with Dr. Ríos on 8/4/21. WBAT. Has some numbness to left leg, will monitor.   -PT and OT for mobility and ADLs  -Follow-up Orthopedic surgery.      Post-op/Back pain - Patient reports having pain contract for Morphine 15 mg q6h. Will continue  - PRN Tylenol     Eye drainage-  Bilateral eyes with drainage. No visual changes. Start Erythromycin QID for 3 days    CAD/HTN - Patient on Clonidine 0.1 mg daily, Imdur 60 mg BID, Metoprolol 50 mg BID, Lisinopril 20 mg.  Patient on Plavix 75 mg daily. Will monitor   -SBP into 150s, increase Metoprolol. Increase Lisinopril to 40 mg    Chest pain - Patient with diagnosis of angina and has nitro tabs.  Check EKG and consult hospitalist     HLD - Patient on Atorvastatin 40 mg QHS     COPD - Not on medications. Will monitor      Anemia - Check AM CBC - 10.3, stable     Leukocytosis - May be reactionary, check AM CBC - 11.8 on 8/7 then normal on 8/8. Will monitor.      Hypothyroidism - Patient on  Levothyroxine 25 mcg M-F and 50 mcg Sun-Sat     Depression/Sleep - Patient on Zoloft 50/100 mg      BPH - Patient on Flomax on transfer.      DVT Ppx - Patient has been cleared for Lovenox.     Total time:  26 minutes.  I spent greater than 50% of the time for patient care, counseling, and coordination on this date, including unit/floor time, and face-to-face time with the patient as per interval events and assessment and plan above. Topics discussed included discharge planning moved to Sunday, elevated SBP, and increase ACEi.     Romi Singh M.D.

## 2021-08-11 NOTE — THERAPY
Physical Therapy   Daily Treatment     Patient Name: Antony Akbar  Age:  66 y.o., Sex:  male  Medical Record #: [id number]  Today's Date: 8/11/2021     Precautions  Precautions: Fall Risk, Weight Bearing As Tolerated Left Lower Extremity  Comments: pacemaker    Subjective    Pt up in wc, willing to participate     Objective       08/11/21 1431   Gait Functional Level of Assist    Gait Level Of Assist Stand by Assist   Assistive Device Front Wheel Walker   Distance (Feet) 150   # of Times Distance was Traveled 3   Deviation Bradykinetic;Antalgic   Supine Lower Body Exercise   Supine Lower Body Exercises Yes   Pelvic Tilt 2 sets of 15   Short Arc Quad 2 sets of 15   Standing Lower Body Exercises   Marching 1 set of 10   PT Total Time Spent   PT Individual Total Time Spent (Mins) 30   PT Charge Group   PT Gait Training 1   PT Therapeutic Exercise 1       Assessment    Pt was limited with LLE exercises and ROM due to pain but tolerated gait without difficulty. Provided cold pack post tx with comfort reported.     Strengths: Able to follow instructions, Effective communication skills, Good insight into deficits/needs, Pleasant and cooperative, Supportive family, Willingly participates in therapeutic activities  Barriers: Decreased endurance, Generalized weakness, Impaired activity tolerance, Impaired balance    Plan    FWW ambulation, introduce ramps and outdoors as weather allows; BLE strengthening and endurance (limited chronic R knee X); improve activity pacing; standing balance; and safety with decreased A for transfers.     Passport items to be completed:  Get in/out of bed safely, in/out of a vehicle, safely use mobility device, walk or wheel around home/community, navigate up and down stairs, show how to get up/down from the ground, ensure home is accessible, demonstrate HEP, complete caregiver training    Physical Therapy Problems (Active)     Problem: Mobility     Dates: Start: 08/10/21       Goal: STG-Within one  week, patient will ambulate community distances with FWW or 4WW and Mod I to safely navigate his home environment     Dates: Start: 08/10/21          Goal: STG-Within one week, patient will ascend/descend an ADA ramp with 4WW or FWW and SPV to safely enter/exit his home     Dates: Start: 08/10/21             Problem: Mobility Transfers     Dates: Start: 08/07/21       Goal: STG-Within one week, patient will transfer bed to chair     Dates: Start: 08/07/21       Goal Note filed on 08/10/21 1116 by Shyam Hollins, PT     Patient currently uses CGA-SBA with FWW for transfers instead of SPV.            Goal: STG-Within one week, patient will transfer in/out of car SPV from ambulatory level with FWW or 4WW     Dates: Start: 08/10/21             Problem: PT-Long Term Goals     Dates: Start: 08/07/21       Goal: LTG-By discharge, patient will ambulate     Dates: Start: 08/07/21       Goal Note filed on 08/07/21 1608 by Teri Vega DPT     1) Individualized goal:   >55' with FWW or 4WW and Mod I to safely navigate his home environment  2) Interventions: PT Group Therapy, PT Gait Training, PT Self Care/Home Eval, PT Therapeutic Exercises, PT Neuro Re-Ed/Balance, PT Therapeutic Activity, and PT Evaluation            Goal: LTG-By discharge, patient will transfer one surface to another     Dates: Start: 08/07/21       Goal Note filed on 08/07/21 1608 by Teri Vega DPT     1) Individualized goal:   Mod I with FWW or 4WW  2) Interventions:  PT Group Therapy, PT Gait Training, PT Self Care/Home Eval, PT Therapeutic Exercises, PT Neuro Re-Ed/Balance, PT Therapeutic Activity, and PT Evaluation            Goal: LTG-By discharge, patient will transfer in/out of a car     Dates: Start: 08/07/21       Goal Note filed on 08/07/21 1608 by Teri Vega DPT     1) Individualized goal:   SPV from ambulatory level with FWW or 4WW  2) Interventions:  PT Group Therapy, PT Gait Training, PT Self Care/Home Eval, PT  Therapeutic Exercises, PT Neuro Re-Ed/Balance, PT Therapeutic Activity, and PT Evaluation            Goal: LTG-By discharge, patient will     Dates: Start: 08/07/21       Goal Note filed on 08/07/21 7343 by Teri Vega DPT     1) Individualized goal:   ascend/descend an ADA ramp with 4WW or FWW and SPV to safely enter/exit his home  2) Interventions:  PT Group Therapy, PT Gait Training, PT Self Care/Home Eval, PT Therapeutic Exercises, PT Neuro Re-Ed/Balance, PT Therapeutic Activity, and PT Evaluation

## 2021-08-11 NOTE — PROGRESS NOTES
Patient care assumed. Report received from day RNSissy. Patient is alert and calm, watching tv in wheelchair. Call light and bedside table within reach. Will continue to monitor.

## 2021-08-11 NOTE — DISCHARGE PLANNING
Per Parris at [facility], home health is no longer available in [place].  CM notified.  Outpatient therapy referral sent to [facility]  Awaiting response.

## 2021-08-11 NOTE — CARE PLAN
Problem: Incision Care  Goal: Optimal post surgical incision care  Outcome: Progressing   Incision without redness, swelling or drainage and skin edges are approximated

## 2021-08-11 NOTE — FLOWSHEET NOTE
08/11/21 1442   Events/Summary/Plan   Events/Summary/Plan 02 spot check   Vital Signs   Pulse 71   Respiration 16   Pulse Oximetry 97 %   $ Pulse Oximetry (Spot Check) Yes   Respiratory Assessment   Level of Consciousness Alert   Chest Exam   Work Of Breathing / Effort Within Normal Limits   Oxygen   O2 (LPM)   (requires 2 lpm at night for sleep apnea)   O2 Delivery Device None - Room Air

## 2021-08-11 NOTE — THERAPY
"Occupational Therapy  Daily Treatment     Patient Name: Antony Akbar  Age:  66 y.o., Sex:  male  Medical Record #: [id number]  Today's Date: 8/11/2021     Precautions  Precautions: (P) Fall Risk, Weight Bearing As Tolerated Left Lower Extremity  Comments: (P) pacemaker         Subjective    \"I have been to a lot of therapy, but never anywhere this nice.\"  Objective       08/11/21 1101   Precautions   Precautions Fall Risk;Weight Bearing As Tolerated Left Lower Extremity   Comments pacemaker   Sitting Upper Body Exercises   Tricep Press 3 sets of 15;Bilateral;Weight (See Comments for lbs)  (20# )   Upper Extremity Bike Level 2 Resistance  (15 minutes)   Interdisciplinary Plan of Care Collaboration   Patient Position at End of Therapy Seated;Phone within Reach;Tray Table within Reach;Call Light within Reach   OT Total Time Spent   OT Individual Total Time Spent (Mins) 30   OT Charge Group   OT Therapeutic Exercise  2       Assessment    Pt tolerated session well focused on endurance and UE strengthening. Pt was pleasant and cooperative throughout session.   Strengths: Able to follow instructions, Alert and oriented, Effective communication skills, Good insight into deficits/needs, Manages pain appropriately, Motivated for self care and independence, Pleasant and cooperative, Supportive family, Willingly participates in therapeutic activities  Barriers: Decreased endurance, Generalized weakness, Limited mobility, Pain    Plan    Standing tolerance, functional mobility/transfers with ADL pursuits, modified ADLs, light IADLs (laundry/simple meal prep), and strengthening to maximize safety and independence with ADL/IADL pursuits.     Passport items to be completed:  Passport items to be completed:  Perform bathroom transfers, complete dressing, complete feeding, get ready for the day, prepare a simple meal, participate in household tasks, adapt home for safety needs, demonstrate home exercise program, complete caregiver " training     Occupational Therapy Goals (Active)     Problem: Dressing     Dates: Start: 08/07/21       Goal: STG-Within one week, patient will dress LB     Dates: Start: 08/07/21       Description: 1) Individualized Goal:  at a CGA with AE PRN.             Problem: Functional Transfers     Dates: Start: 08/07/21       Goal: STG-Within one week, patient will transfer to tub/shower     Dates: Start: 08/07/21       Description: 1) Individualized Goal:  with tub shower bench at a CGA level with AE/AD PRN.           Problem: OT Long Term Goals     Dates: Start: 08/07/21       Goal: LTG-By discharge, patient will complete basic self care tasks     Dates: Start: 08/07/21       Description: 1) Individualized Goal:  at a Min A to Mod I level with use of AE/AD PRN.            Goal: LTG-By discharge, patient will perform bathroom transfers     Dates: Start: 08/07/21       Description: 1) Individualized Goal:  at a Min A to Mod I level with AD/ DME PRN.

## 2021-08-11 NOTE — CARE PLAN
"The patient is Stable - Low risk of patient condition declining or worsening    Shift Goals  Clinical Goals: pain management  Patient Goals: sleep      Problem: Fall Risk - Rehab  Goal: Patient will remain free from falls  Outcome: Progressing: Pt uses call light consistently and appropriately. Waits for assistance does not attempt self transfer this shift. Able to verbalize needs. Will continue to monitor.      Problem: Pain - Standard  Goal: Alleviation of pain or a reduction in pain to the patient’s comfort goal  Outcome: Progressing:Patient able to verbalize pain level and verbalize an acceptable level of pain. Pt said he was in \"a little pain\" at the beginning of shift, he had a scheduled Tylenol which I gave, he was offered PRN pain medication also but he wanted to see if the Tylenol would work first. He never did ask for more pain medication. His scheduled Tylenol and morphine was enough to keep him comfortable. Will continue to monitor.          "

## 2021-08-11 NOTE — THERAPY
Occupational Therapy  Daily Treatment     Patient Name: Antony Akbar  Age:  66 y.o., Sex:  male  Medical Record #: [id number]  Today's Date: 8/11/2021     Precautions  Precautions: Fall Risk, Weight Bearing As Tolerated Left Lower Extremity  Comments: pacemaker         Subjective       Objective       08/11/21 0831   Precautions   Precautions Fall Risk;Weight Bearing As Tolerated Left Lower Extremity   Comments pacemaker   Vitals   O2 Delivery Device None - Room Air   Non Verbal Descriptors   Non Verbal Scale  Calm   Cognition    Level of Consciousness Alert   Sleep/Wake Cycle   Sleep & Rest Awake   Functional Level of Assist   Grooming Modified Independent;Seated   Grooming Description Increased time;Seated in wheelchair at sink   Toileting Supervision   Toileting Description Grab bar;Supervision for safety   Toilet Transfers Stand by Assist  (wc/commode)   Toilet Transfer Description Grab bar;Supervision for safety   Tub / Shower Transfers Contact Guard Assist   Tub Shower Transfer Description Shower bench   Standing Upper Body Exercises   Comments Standing in Parallel Bars (PB) for Ladder Ball (LB) activity - Stranding at one end of PB's w/ ladder for LB 5' behind patient.  Patient instructed to navigate 12' to opposite end of PB's to retrieve one LB at a time and return 12' to starting location to toss LB at ladder.  1 LB = 12'x2 = 24'.  pt completed 1x5 = 5x24' = 120', CGA via gait belt, no LOB, back pain/discomfort primary limiter, seated rest break x 1.   Sitting Lower Body Exercises   Nustep Resistance Level 3  (15 mins, rest break x 1, .35 miles)   Interdisciplinary Plan of Care Collaboration   IDT Collaboration with  Nursing   Patient Position at End of Therapy Seated;Chair Alarm On;Self Releasing Lap Belt Applied;Call Light within Reach;Tray Table within Reach;Phone within Reach   Collaboration Comments CLOF, meds   OT Total Time Spent   OT Individual Total Time Spent (Mins) 60   OT Charge Group   OT Self  Care / ADL 1   OT Therapeutic Exercise  3       Assessment    Pt was alert and cooperative w/ tx.  Tx emphasis on ADL's, TherEx - primary limiter back pain/discomfort, decreased standing balance, generalized weakness - see notes above for details.  Patient transferred into/out of tub/shower via wc/TTB via SPT at CGA.  Strengths: Able to follow instructions, Alert and oriented, Effective communication skills, Good insight into deficits/needs, Manages pain appropriately, Motivated for self care and independence, Pleasant and cooperative, Supportive family, Willingly participates in therapeutic activities  Barriers: Decreased endurance, Generalized weakness, Limited mobility, Pain    Plan    Standing tolerance, functional mobility/transfers with ADL pursuits, modified ADLs, light IADLs (laundry/simple meal prep), and strengthening to maximize safety and independence with ADL/IADL pursuits.    Passport items to be completed:  Passport items to be completed:  Perform bathroom transfers, complete dressing, complete feeding, get ready for the day, prepare a simple meal, participate in household tasks, adapt home for safety needs, demonstrate home exercise program, complete caregiver training     Occupational Therapy Goals (Active)     Problem: Dressing     Dates: Start: 08/07/21       Goal: STG-Within one week, patient will dress LB     Dates: Start: 08/07/21       Description: 1) Individualized Goal:  at a CGA with AE PRN.             Problem: Functional Transfers     Dates: Start: 08/07/21       Goal: STG-Within one week, patient will transfer to tub/shower     Dates: Start: 08/07/21       Description: 1) Individualized Goal:  with tub shower bench at a CGA level with AE/AD PRN.           Problem: OT Long Term Goals     Dates: Start: 08/07/21       Goal: LTG-By discharge, patient will complete basic self care tasks     Dates: Start: 08/07/21       Description: 1) Individualized Goal:  at a Min A to Mod I level with use  of AE/AD PRN.            Goal: LTG-By discharge, patient will perform bathroom transfers     Dates: Start: 08/07/21       Description: 1) Individualized Goal:  at a Min A to Mod I level with AD/ DME PRN.

## 2021-08-12 PROCEDURE — 700102 HCHG RX REV CODE 250 W/ 637 OVERRIDE(OP): Performed by: PHYSICAL MEDICINE & REHABILITATION

## 2021-08-12 PROCEDURE — 770010 HCHG ROOM/CARE - REHAB SEMI PRIVAT*

## 2021-08-12 PROCEDURE — 94760 N-INVAS EAR/PLS OXIMETRY 1: CPT

## 2021-08-12 PROCEDURE — 97110 THERAPEUTIC EXERCISES: CPT

## 2021-08-12 PROCEDURE — 99232 SBSQ HOSP IP/OBS MODERATE 35: CPT | Performed by: PHYSICAL MEDICINE & REHABILITATION

## 2021-08-12 PROCEDURE — 700111 HCHG RX REV CODE 636 W/ 250 OVERRIDE (IP): Performed by: PHYSICAL MEDICINE & REHABILITATION

## 2021-08-12 PROCEDURE — 97116 GAIT TRAINING THERAPY: CPT

## 2021-08-12 PROCEDURE — A9270 NON-COVERED ITEM OR SERVICE: HCPCS | Performed by: PHYSICAL MEDICINE & REHABILITATION

## 2021-08-12 PROCEDURE — A9270 NON-COVERED ITEM OR SERVICE: HCPCS | Performed by: HOSPITALIST

## 2021-08-12 PROCEDURE — 97535 SELF CARE MNGMENT TRAINING: CPT

## 2021-08-12 PROCEDURE — 99232 SBSQ HOSP IP/OBS MODERATE 35: CPT | Performed by: HOSPITALIST

## 2021-08-12 PROCEDURE — 700101 HCHG RX REV CODE 250: Performed by: PHYSICAL MEDICINE & REHABILITATION

## 2021-08-12 PROCEDURE — 97530 THERAPEUTIC ACTIVITIES: CPT

## 2021-08-12 PROCEDURE — 700102 HCHG RX REV CODE 250 W/ 637 OVERRIDE(OP): Performed by: HOSPITALIST

## 2021-08-12 RX ADMIN — CLOPIDOGREL BISULFATE 75 MG: 75 TABLET ORAL at 08:54

## 2021-08-12 RX ADMIN — ISOSORBIDE MONONITRATE 60 MG: 30 TABLET, EXTENDED RELEASE ORAL at 19:51

## 2021-08-12 RX ADMIN — MORPHINE SULFATE 15 MG: 15 TABLET ORAL at 23:25

## 2021-08-12 RX ADMIN — ACETAMINOPHEN 650 MG: 325 TABLET ORAL at 08:54

## 2021-08-12 RX ADMIN — MORPHINE SULFATE 15 MG: 15 TABLET ORAL at 13:06

## 2021-08-12 RX ADMIN — SERTRALINE HYDROCHLORIDE 100 MG: 50 TABLET ORAL at 19:52

## 2021-08-12 RX ADMIN — ACETAMINOPHEN 650 MG: 325 TABLET ORAL at 15:00

## 2021-08-12 RX ADMIN — ENOXAPARIN SODIUM 40 MG: 40 INJECTION SUBCUTANEOUS at 08:54

## 2021-08-12 RX ADMIN — FAMOTIDINE 40 MG: 20 TABLET ORAL at 19:54

## 2021-08-12 RX ADMIN — LEVOTHYROXINE SODIUM 25 MCG: 25 TABLET ORAL at 05:18

## 2021-08-12 RX ADMIN — Medication 1000 UNITS: at 08:53

## 2021-08-12 RX ADMIN — ACETAMINOPHEN 650 MG: 325 TABLET ORAL at 19:52

## 2021-08-12 RX ADMIN — MORPHINE SULFATE 15 MG: 15 TABLET ORAL at 05:18

## 2021-08-12 RX ADMIN — SERTRALINE HYDROCHLORIDE 50 MG: 50 TABLET ORAL at 08:54

## 2021-08-12 RX ADMIN — LISINOPRIL 40 MG: 20 TABLET ORAL at 05:18

## 2021-08-12 RX ADMIN — ERYTHROMYCIN: 5 OINTMENT OPHTHALMIC at 08:58

## 2021-08-12 RX ADMIN — OMEPRAZOLE 20 MG: 20 CAPSULE, DELAYED RELEASE ORAL at 08:54

## 2021-08-12 RX ADMIN — ATORVASTATIN CALCIUM 40 MG: 40 TABLET, FILM COATED ORAL at 19:52

## 2021-08-12 RX ADMIN — ISOSORBIDE MONONITRATE 60 MG: 30 TABLET, EXTENDED RELEASE ORAL at 08:53

## 2021-08-12 RX ADMIN — TAMSULOSIN HYDROCHLORIDE 0.4 MG: 0.4 CAPSULE ORAL at 19:52

## 2021-08-12 RX ADMIN — METOPROLOL TARTRATE 75 MG: 25 TABLET, FILM COATED ORAL at 17:53

## 2021-08-12 RX ADMIN — MORPHINE SULFATE 15 MG: 15 TABLET ORAL at 17:53

## 2021-08-12 RX ADMIN — METOPROLOL TARTRATE 75 MG: 25 TABLET, FILM COATED ORAL at 05:17

## 2021-08-12 ASSESSMENT — GAIT ASSESSMENTS
GAIT LEVEL OF ASSIST: SUPERVISED
ASSISTIVE DEVICE: FRONT WHEEL WALKER
ASSISTIVE DEVICE: FRONT WHEEL WALKER
DISTANCE (FEET): 250
DEVIATION: DECREASED BASE OF SUPPORT;STEP TO;BRADYKINETIC
DEVIATION: DECREASED BASE OF SUPPORT;STEP TO;BRADYKINETIC
GAIT LEVEL OF ASSIST: SUPERVISED

## 2021-08-12 ASSESSMENT — ENCOUNTER SYMPTOMS
FEVER: 0
DIZZINESS: 0
BLURRED VISION: 0
DIARRHEA: 0
NERVOUS/ANXIOUS: 0
COUGH: 0

## 2021-08-12 ASSESSMENT — ACTIVITIES OF DAILY LIVING (ADL)
TOILET_TRANSFER_DESCRIPTION: GRAB BAR;SUPERVISION FOR SAFETY
TOILETING_LEVEL_OF_ASSIST_DESCRIPTION: INCREASED TIME
TOILET_TRANSFER_DESCRIPTION: GRAB BAR;INCREASED TIME;SUPERVISION FOR SAFETY
TOILET_TRANSFER_DESCRIPTION: GRAB BAR;SUPERVISION FOR SAFETY

## 2021-08-12 ASSESSMENT — PAIN DESCRIPTION - PAIN TYPE
TYPE: ACUTE PAIN
TYPE: ACUTE PAIN

## 2021-08-12 NOTE — PROGRESS NOTES
"Rehab Progress Note     Encounter Date: 2021    CC: decreased mobility, decreased endurance    Interval Events (Subjective)  Patient sitting up in room. He reports he is doing well. He reports therapy is going well. His pain is under control. SBP improved on higher dose of ACEi.  He has improved ambulation, can discontinue Lovenox     IDT Team Meeting 8/10/2021  DC/Disposition:  21 -> 8/15/21     Objective:  VITAL SIGNS: /86   Pulse 74   Temp 36.6 °C (97.8 °F) (Oral)   Resp 16   Ht 1.707 m (5' 7.2\")   Wt 84.5 kg (186 lb 4.6 oz)   SpO2 93%   BMI 29.00 kg/m²   Gen: NAD  Psych: Mood and affect appropriate  CV: RRR, no edema  Resp: CTAB, no upper airway sounds  Abd: NTND  Neuro: AOx4, following commands  Unchanged from 21     Recent Results (from the past 72 hour(s))   EKG    Collection Time: 08/10/21 10:23 AM   Result Value Ref Range    Report       [facility]    Test Date:  2021-08-10  Pt Name:    MARCOS LOGAN                 Department: [facility]  MRN:        [id number]                      Room:       [facility]  Gender:     Male                         Technician: GIO  :        1955                   Requested By:DAVIE BECK  Order #:    [id number]                    Reading MD: Shyam Baez MD    Measurements  Intervals                                Axis  Rate:       72                           P:  UT:         184                          QRS:        27  QRSD:       110                          T:          76  QT:         416  QTc:        456    Interpretive Statements  ATRIAL-PACED RHYTHM  INCOMPLETE RIGHT BUNDLE BRANCH BLOCK  BASELINE WANDER IN LEAD(S) V5  Compared to ECG 2021 17:28:55  Sinus rhythm no longer present  Myocardial infarct finding no longer present  Electronically Signed On 8- 15:53:53 PDT by Shyam Baez MD     TROPONIN    Collection Time: 08/10/21 11:32 AM   Result Value Ref Range    Troponin T 16 6 - 19 ng/L       Current " Facility-Administered Medications   Medication Frequency   • lisinopril (PRINIVIL) tablet 40 mg Q DAY   • nitroglycerin (NITROSTAT) tablet 0.4 mg Q5 MIN PRN   • vitamin D (cholecalciferol) tablet 1,000 Units DAILY   • metoprolol tartrate (LOPRESSOR) tablet 75 mg BID   • melatonin tablet 3 mg QHS PRN   • Respiratory Therapy Consult Continuous RT   • Pharmacy Consult Request ...Pain Management Review 1 Each PHARMACY TO DOSE   • oxyCODONE immediate-release (ROXICODONE) tablet 5 mg Q3HRS PRN    Or   • oxyCODONE immediate release (ROXICODONE) tablet 10 mg Q3HRS PRN   • hydrALAZINE (APRESOLINE) tablet 25 mg Q8HRS PRN   • acetaminophen (Tylenol) tablet 650 mg Q4HRS PRN   • senna-docusate (PERICOLACE or SENOKOT S) 8.6-50 MG per tablet 2 tablet BID    And   • polyethylene glycol/lytes (MIRALAX) PACKET 1 Packet QDAY PRN    And   • magnesium hydroxide (MILK OF MAGNESIA) suspension 30 mL QDAY PRN    And   • bisacodyl (DULCOLAX) suppository 10 mg QDAY PRN   • artificial tears ophthalmic solution 1 Drop PRN   • benzocaine-menthol (CEPACOL) lozenge 1 Lozenge Q2HRS PRN   • mag hydrox-al hydrox-simeth (MAALOX PLUS ES or MYLANTA DS) suspension 20 mL Q2HRS PRN   • ondansetron (ZOFRAN ODT) dispertab 4 mg 4X/DAY PRN    Or   • ondansetron (ZOFRAN) syringe/vial injection 4 mg 4X/DAY PRN   • traZODone (DESYREL) tablet 50 mg QHS PRN   • sodium chloride (OCEAN) 0.65 % nasal spray 2 Spray PRN   • acetaminophen (Tylenol) tablet 650 mg TID   • albuterol inhaler 2 Puff Q6HRS PRN   • atorvastatin (LIPITOR) tablet 40 mg Nightly   • butalbital/apap/caffeine -40 mg (Fioricet) per tablet 1 tablet Q4HRS PRN   • clopidogrel (PLAVIX) tablet 75 mg DAILY   • enoxaparin (LOVENOX) inj 40 mg DAILY   • famotidine (PEPCID) tablet 40 mg Q EVENING   • isosorbide mononitrate SR (IMDUR) tablet 60 mg BID   • levothyroxine (SYNTHROID) tablet 25 mcg Once per day on Mon Tue Wed Thu Fri   • levothyroxine (SYNTHROID) tablet 50 mcg Once per day on Sun Sat   •  omeprazole (PRILOSEC) capsule 20 mg QAM   • sertraline (Zoloft) tablet 100 mg Q EVENING   • sertraline (Zoloft) tablet 50 mg QAM   • tamsulosin (FLOMAX) capsule 0.4 mg Q EVENING   • morphine (MS IR) tablet 15 mg Q6HRS       Orders Placed This Encounter   Procedures   • Diet Order Diet: Regular     Standing Status:   Standing     Number of Occurrences:   1     Order Specific Question:   Diet:     Answer:   Regular [1]       Assessment:  Active Hospital Problems    Diagnosis    • *Closed left hip fracture (HCC)    • CAD (coronary artery disease) of artery bypass graft    • COPD (chronic obstructive pulmonary disease) ([facility])    • Essential hypertension    • GERD (gastroesophageal reflux disease)    • Hypothyroidism    • Leukocytosis        Medical Decision Making and Plan:  Left hip fracture - GLF on 8/4/21 due to dog found to have left hip fracture. Patient is now s/p intramedullary device with Dr. Ríos on 8/4/21. WBAT. Has some numbness to left leg, will monitor.   -PT and OT for mobility and ADLs  -Follow-up Orthopedic surgery.      Post-op/Back pain - Patient reports having pain contract for Morphine 15 mg q6h. Will continue  - PRN Tylenol     Eye drainage-  Bilateral eyes with drainage. No visual changes. Start Erythromycin QID for 3 days    CAD/HTN - Patient on Clonidine 0.1 mg daily, Imdur 60 mg BID, Metoprolol 50 mg BID, Lisinopril 20 mg.  Patient on Plavix 75 mg daily. Will monitor   -SBP into 150s, increase Metoprolol. Increase Lisinopril to 40 mg    Chest pain - Patient with diagnosis of angina and has nitro tabs.  Check EKG and consult hospitalist     HLD - Patient on Atorvastatin 40 mg QHS     COPD - Not on medications. Will monitor      Anemia - Check AM CBC - 10.3, stable     Leukocytosis - May be reactionary, check AM CBC - 11.8 on 8/7 then normal on 8/8. Will monitor.      Hypothyroidism - Patient on Levothyroxine 25 mcg M-F and 50 mcg Sun-Sat     Depression/Sleep - Patient on Zoloft 50/100 mg       BPH - Patient on Flomax on transfer.      DVT Ppx - Patient has been cleared for Lovenox. Ambulating > 150 feet, discontinue    Total time:  26 minutes.  I spent greater than 50% of the time for patient care, counseling, and coordination on this date, including unit/floor time, and face-to-face time with the patient as per interval events and assessment and plan above. Topics discussed included improved SBP, improved mobility and discontinue Lovenox.     Romi Singh M.D.

## 2021-08-12 NOTE — PROGRESS NOTES
Patient care assumed. Report received from day RNEmili. Patient is alert and calm, resting in bed. Call light and bedside table within reach. Will continue to monitor.

## 2021-08-12 NOTE — THERAPY
Physical Therapy   Daily Treatment     Patient Name: Antony Akbar  Age:  66 y.o., Sex:  male  Medical Record #: [id number]  Today's Date: 8/12/2021     Precautions  Precautions: Fall Risk, Weight Bearing As Tolerated Left Lower Extremity  Comments: pacemaker    Subjective    Patient agreeable to PT.     Objective       08/12/21 1231   Vitals   O2 (LPM) 0   O2 Delivery Device None - Room Air   Pain   Intervention Education;Rest;Repositioned   Pain 0 - 10 Group   Location Groin;Hip   Location Orientation Left;Inner   Description Sharp;Intermittent   Comfort Goal Comfort with Movement;Perform Activity   Therapist Pain Assessment   (only once when sitting after second ambulation rep)   Gait Functional Level of Assist    Gait Level Of Assist Supervised   Assistive Device Front Wheel Walker   Distance (Feet)   (321 feet and 198 ft; average velocity of 0.24 m/s)   # of Times Distance was Traveled   (per above)   Deviation Decreased Base Of Support;Step To;Bradykinetic   Wheelchair Functional Level of Assist   Wheelchair Assist   (SPV-IND)   Distance Wheelchair (Feet or Distance) 3 reps indoors of distances between 100-150 feet without fatigue noted   Wheelchair Description Extra time;Verbal cueing  (cueing for path finding prn)   Transfer Functional Level of Assist   Bed, Chair, Wheelchair Transfer Supervised   Bed Chair Wheelchair Transfer Description Increased time;Set-up of equipment;Supervision for safety;Non-hospital bed  (FWW, bed rail optional)   Toilet Transfers Supervised   Toilet Transfer Description Grab bar;Increased time;Supervision for safety  (FWW level)   Bed Mobility    Supine to Sit Modified Independent   Sit to Supine Modified Independent   Sit to Stand Supervised  (with FWW)   Scooting Supervised   Rolling Modified Independent   Interdisciplinary Plan of Care Collaboration   Patient Position at End of Therapy Seated;Self Releasing Lap Belt Applied;Call Light within Reach;Tray Table within Reach;Phone  within Reach   Collaboration Comments updated pt's room board; reviewed POC   PT Total Time Spent   PT Individual Total Time Spent (Mins) 60   PT Charge Group   PT Gait Training 1   PT Therapeutic Activities 3     Reviewed activity pacing, pain management (RN briefly present for meds admin too), POC, removal of floor mats and rugs at home, ambulation and transfers with FWW while making sure dogs are out of the way, discussed prolonged seated ride home compared with pt's currently good seated/OOB tolerance here, and likely seated HEP program with hip flexion/ABD/ADD activities as tolerated.  MD also reports d/c date updated to 8/15/21.    Assessment    Patient has improving activity pacing throughout session; continues to require decreased A with bed mobility and transfers; ambulation endurance improving; no LOB; only one instance of pain as noted above.    Strengths: Able to follow instructions, Effective communication skills, Good insight into deficits/needs, Pleasant and cooperative, Supportive family, Willingly participates in therapeutic activities  Barriers: Decreased endurance, Generalized weakness, Impaired activity tolerance, Impaired balance    Plan    FWW ambulation, outdoors as weather allows; seated HEP for BLE strengthening and endurance (limited chronic R knee X); safety with decreased A for transfers; review ramps.  D/C on Nav[facility] 8/15/21.    Passport items to be completed:  Passport items to be completed:  Get in/out of bed safely, in/out of a vehicle, safely use mobility device, walk or wheel around home/community, navigate up and down stairs, show how to get up/down from the ground, ensure home is accessible, demonstrate HEP, complete caregiver training    Physical Therapy Problems (Active)     Problem: Mobility     Dates: Start: 08/10/21       Goal: STG-Within one week, patient will ambulate community distances with FWW or 4WW and Mod I to safely navigate his home environment     Dates: Start:  08/10/21          Goal: STG-Within one week, patient will ascend/descend an ADA ramp with 4WW or FWW and SPV to safely enter/exit his home     Dates: Start: 08/10/21             Problem: Mobility Transfers     Dates: Start: 08/07/21       Goal: STG-Within one week, patient will transfer bed to chair     Dates: Start: 08/07/21       Goal Note filed on 08/10/21 1116 by Shyam Hollins, PT     Patient currently uses CGA-SBA with FWW for transfers instead of SPV.            Goal: STG-Within one week, patient will transfer in/out of car SPV from ambulatory level with FWW or 4WW     Dates: Start: 08/10/21             Problem: PT-Long Term Goals     Dates: Start: 08/07/21       Goal: LTG-By discharge, patient will ambulate     Dates: Start: 08/07/21       Goal Note filed on 08/07/21 1608 by Teri Vega DPT     1) Individualized goal:   >55' with FWW or 4WW and Mod I to safely navigate his home environment  2) Interventions: PT Group Therapy, PT Gait Training, PT Self Care/Home Eval, PT Therapeutic Exercises, PT Neuro Re-Ed/Balance, PT Therapeutic Activity, and PT Evaluation            Goal: LTG-By discharge, patient will transfer one surface to another     Dates: Start: 08/07/21       Goal Note filed on 08/07/21 1608 by Teri Vega DPT     1) Individualized goal:   Mod I with FWW or 4WW  2) Interventions:  PT Group Therapy, PT Gait Training, PT Self Care/Home Eval, PT Therapeutic Exercises, PT Neuro Re-Ed/Balance, PT Therapeutic Activity, and PT Evaluation            Goal: LTG-By discharge, patient will transfer in/out of a car     Dates: Start: 08/07/21       Goal Note filed on 08/07/21 1608 by Teri Vega DPT     1) Individualized goal:   SPV from ambulatory level with FWW or 4WW  2) Interventions:  PT Group Therapy, PT Gait Training, PT Self Care/Home Eval, PT Therapeutic Exercises, PT Neuro Re-Ed/Balance, PT Therapeutic Activity, and PT Evaluation            Goal: LTG-By discharge, patient will      Dates: Start: 08/07/21       Goal Note filed on 08/07/21 1608 by Teri Vega DPT     1) Individualized goal:   ascend/descend an ADA ramp with 4WW or FWW and SPV to safely enter/exit his home  2) Interventions:  PT Group Therapy, PT Gait Training, PT Self Care/Home Eval, PT Therapeutic Exercises, PT Neuro Re-Ed/Balance, PT Therapeutic Activity, and PT Evaluation

## 2021-08-12 NOTE — FLOWSHEET NOTE
08/12/21 0919   Events/Summary/Plan   Events/Summary/Plan SpO2 check   Vital Signs   Pulse 74   Respiration 16   Pulse Oximetry 93 %   $ Pulse Oximetry (Spot Check) Yes   Oxygen   O2 Delivery Device Room air w/o2 available

## 2021-08-12 NOTE — CARE PLAN
The patient is Stable - Low risk of patient condition declining or worsening    Shift Goals  Clinical Goals: sleep  Patient Goals: sleep      Problem: Fall Risk - Rehab  Goal: Patient will remain free from falls  Outcome: Progressing:Pt uses call light consistently and appropriately. Waits for assistance does not attempt self transfer this shift. Able to verbalize needs. Will continue to monitor.      Problem: Pain - Standard  Goal: Alleviation of pain or a reduction in pain to the patient’s comfort goal  Outcome: Progressing:Patient able to verbalize pain level and verbalize an acceptable level of pain. Didn't ask for any PRN pain medications, only took scheduled pain meds. Will continue to monitor.

## 2021-08-12 NOTE — THERAPY
Physical Therapy   Daily Treatment     Patient Name: Antony Akbar  Age:  66 y.o., Sex:  male  Medical Record #: [id number]  Today's Date: 8/12/2021     Precautions  Precautions: Fall Risk, Weight Bearing As Tolerated Left Lower Extremity  Comments: pacemaker    Subjective    Pt found in w/c, agreeable to PT.     Objective       08/12/21 0931   Gait Functional Level of Assist    Gait Level Of Assist Supervised   Assistive Device Front Wheel Walker   Distance (Feet) 250   # of Times Distance was Traveled 1   Deviation Decreased Base Of Support;Step To;Bradykinetic   Sitting Lower Body Exercises   Sit to Stand 1 set of 10  (STS w/c<>FWW using UE's, unable to complete w/o UE's)   Interdisciplinary Plan of Care Collaboration   Patient Position at End of Therapy Seated;Self Releasing Lap Belt Applied;Call Light within Reach;Tray Table within Reach;Other (Comments)  (ice pack L hip)   PT Total Time Spent   PT Individual Total Time Spent (Mins) 30   PT Charge Group   PT Gait Training 1   PT Therapeutic Exercise 1     Gait training w/ FWW up/down small wooden ramp in back gym x 2 reps with CGA, decreased speed for safety descending ramp.    Assessment    Pt progressing well, limited by previous LE impairments with improving gait quality.      Strengths: Able to follow instructions, Effective communication skills, Good insight into deficits/needs, Pleasant and cooperative, Supportive family, Willingly participates in therapeutic activities  Barriers: Decreased endurance, Generalized weakness, Impaired activity tolerance, Impaired balance    Plan    FWW ambulation, outdoors as weather allows; BLE strengthening and endurance (limited chronic R knee X); improve activity pacing; standing balance; and safety with decreased A for transfers, review ramps     Passport items to be completed:  Get in/out of bed safely, in/out of a vehicle, safely use mobility device, walk or wheel around home/community, navigate up and down stairs, show  how to get up/down from the ground, ensure home is accessible, demonstrate HEP, complete caregiver training    Physical Therapy Problems (Active)     Problem: Mobility     Dates: Start: 08/10/21       Goal: STG-Within one week, patient will ambulate community distances with FWW or 4WW and Mod I to safely navigate his home environment     Dates: Start: 08/10/21          Goal: STG-Within one week, patient will ascend/descend an ADA ramp with 4WW or FWW and SPV to safely enter/exit his home     Dates: Start: 08/10/21             Problem: Mobility Transfers     Dates: Start: 08/07/21       Goal: STG-Within one week, patient will transfer bed to chair     Dates: Start: 08/07/21       Goal Note filed on 08/10/21 1116 by Shyam Hollins, PT     Patient currently uses CGA-SBA with FWW for transfers instead of SPV.            Goal: STG-Within one week, patient will transfer in/out of car SPV from ambulatory level with FWW or 4WW     Dates: Start: 08/10/21             Problem: PT-Long Term Goals     Dates: Start: 08/07/21       Goal: LTG-By discharge, patient will ambulate     Dates: Start: 08/07/21       Goal Note filed on 08/07/21 1608 by Teri Vega DPT     1) Individualized goal:   >55' with FWW or 4WW and Mod I to safely navigate his home environment  2) Interventions: PT Group Therapy, PT Gait Training, PT Self Care/Home Eval, PT Therapeutic Exercises, PT Neuro Re-Ed/Balance, PT Therapeutic Activity, and PT Evaluation            Goal: LTG-By discharge, patient will transfer one surface to another     Dates: Start: 08/07/21       Goal Note filed on 08/07/21 1608 by Teri Vega DPT     1) Individualized goal:   Mod I with FWW or 4WW  2) Interventions:  PT Group Therapy, PT Gait Training, PT Self Care/Home Eval, PT Therapeutic Exercises, PT Neuro Re-Ed/Balance, PT Therapeutic Activity, and PT Evaluation            Goal: LTG-By discharge, patient will transfer in/out of a car     Dates: Start: 08/07/21        Goal Note filed on 08/07/21 1608 by Teri Vega DPT     1) Individualized goal:   SPV from ambulatory level with FWW or 4WW  2) Interventions:  PT Group Therapy, PT Gait Training, PT Self Care/Home Eval, PT Therapeutic Exercises, PT Neuro Re-Ed/Balance, PT Therapeutic Activity, and PT Evaluation            Goal: LTG-By discharge, patient will     Dates: Start: 08/07/21       Goal Note filed on 08/07/21 1608 by Teri Vega DPT     1) Individualized goal:   ascend/descend an ADA ramp with 4WW or FWW and SPV to safely enter/exit his home  2) Interventions:  PT Group Therapy, PT Gait Training, PT Self Care/Home Eval, PT Therapeutic Exercises, PT Neuro Re-Ed/Balance, PT Therapeutic Activity, and PT Evaluation

## 2021-08-12 NOTE — THERAPY
Occupational Therapy  Daily Treatment     Patient Name: Antony Akbar  Age:  66 y.o., Sex:  male  Medical Record #: [id number]  Today's Date: 8/12/2021     Precautions  Precautions: Fall Risk, Weight Bearing As Tolerated Left Lower Extremity  Comments: pacemaker         Subjective       Objective       08/12/21 0701   Precautions   Precautions Fall Risk;Weight Bearing As Tolerated Left Lower Extremity   Comments pacemaker   Non Verbal Descriptors   Non Verbal Scale  Calm   Cognition    Level of Consciousness Alert   Sleep/Wake Cycle   Sleep & Rest Asleep   Sleep Observations Alert upon awakening   Functional Level of Assist   Lower Body Dressing Supervision   Lower Body Dressing Description Reacher;Sock aid;Increased time;Supervision for safety   Toileting Supervision   Toileting Description Grab bar;Supervision for safety;Increased time;Initial preparation for task   Toilet Transfers Stand by Assist   Toilet Transfer Description Grab bar;Supervision for safety   Sitting Lower Body Exercises   Nustep Resistance Level 4  (15 mins, .38 miles)   Interdisciplinary Plan of Care Collaboration   IDT Collaboration with  Certified Nursing Assistant   Patient Position at End of Therapy Seated;Chair Alarm On;Self Releasing Lap Belt Applied;Call Light within Reach;Tray Table within Reach;Phone within Reach   OT Total Time Spent   OT Individual Total Time Spent (Mins) 60   OT Charge Group   OT Self Care / ADL 1   OT Therapy Activity 2   OT Therapeutic Exercise  1     SBA in standing for 24 piece jumbo puzzle - standing balance/endurance/WB RLE - standing 6 mins x 3 w/ seated rest break between , 14 of 24 pieces, side step L/R, trunk rotation in standing, reach/rake w/ trunk flexion.  No LOB, seated rest break secondary low back discomfort with standing 5-10 mins - resolved w/ sitting.  Additional time for problem solving (18 mins for 14 of 24 pieces for simple puzzle task).    Assessment    Pt was alert and cooperative w/ tx.  Tx  emphasis on ADL's, TherEx, standing balance/endurance + WB BLE's (dynamic/static).  Barrier low back pain/discomfort w/ standing 5-10 mins, resolved w/ seated rest break.  See notes above for details.  Strengths: Able to follow instructions, Alert and oriented, Effective communication skills, Good insight into deficits/needs, Manages pain appropriately, Motivated for self care and independence, Pleasant and cooperative, Supportive family, Willingly participates in therapeutic activities  Barriers: Decreased endurance, Generalized weakness, Limited mobility, Pain    Plan    Standing tolerance, functional mobility/transfers with ADL pursuits, modified ADLs, light IADLs (laundry/simple meal prep), and strengthening to maximize safety and independence with ADL/IADL pursuits.    Passport items to be completed:  Passport items to be completed:  Perform bathroom transfers, complete dressing, complete feeding, get ready for the day, prepare a simple meal, participate in household tasks, adapt home for safety needs, demonstrate home exercise program, complete caregiver training     Occupational Therapy Goals (Active)     Problem: Dressing     Dates: Start: 08/07/21       Goal: STG-Within one week, patient will dress LB     Dates: Start: 08/07/21       Description: 1) Individualized Goal:  at a CGA with AE PRN.             Problem: Functional Transfers     Dates: Start: 08/07/21       Goal: STG-Within one week, patient will transfer to tub/shower     Dates: Start: 08/07/21       Description: 1) Individualized Goal:  with tub shower bench at a CGA level with AE/AD PRN.           Problem: OT Long Term Goals     Dates: Start: 08/07/21       Goal: LTG-By discharge, patient will complete basic self care tasks     Dates: Start: 08/07/21       Description: 1) Individualized Goal:  at a Min A to Mod I level with use of AE/AD PRN.            Goal: LTG-By discharge, patient will perform bathroom transfers     Dates: Start: 08/07/21        Description: 1) Individualized Goal:  at a Min A to Mod I level with AD/ DME PRN.

## 2021-08-12 NOTE — THERAPY
Occupational Therapy  Daily Treatment     Patient Name: Antony Akbar  Age:  66 y.o., Sex:  male  Medical Record #: [id number]  Today's Date: 8/12/2021     Precautions  Precautions: Fall Risk, Weight Bearing As Tolerated Left Lower Extremity  Comments: pacemaker         Subjective       Objective       08/12/21 0831   Precautions   Precautions Fall Risk;Weight Bearing As Tolerated Left Lower Extremity   Comments pacemaker   Vitals   O2 Delivery Device None - Room Air   Non Verbal Descriptors   Non Verbal Scale  Calm   Cognition    Level of Consciousness Alert   Sleep/Wake Cycle   Sleep & Rest Awake   Functional Level of Assist   Grooming Modified Independent;Seated   Grooming Description Increased time;Seated in wheelchair at sink   Toileting Modified Independent   Toileting Description Increased time   Toilet Transfers Stand by Assist   Toilet Transfer Description Grab bar;Supervision for safety   Standing Upper Body Exercises   Comments Standing in Parallel Bars (PB) for Ladder Ball (LB) activity - Stranding at one end of PB's w/ ladder for LB 5' behind patient.  Patient instructed to navigate 12' to opposite end of PB's to retrieve one LB at a time and return 12' to starting location to toss LB at ladder.  1 LB = 12'x2 = 24'.  pt completed 1x6 = 6x24' = 144'.  CGA via gait belt, no LOB, back pain/discomfort primary limiter.   Interdisciplinary Plan of Care Collaboration   IDT Collaboration with  Certified Nursing Assistant   Patient Position at End of Therapy Seated;Chair Alarm On;Self Releasing Lap Belt Applied;Call Light within Reach;Tray Table within Reach;Phone within Reach   OT Total Time Spent   OT Individual Total Time Spent (Mins) 30   OT Charge Group   OT Self Care / ADL 1   OT Therapeutic Exercise  1       Assessment    Pt was alert and cooperative w/ tx.  Tx emphasis on ADL's, functional mobility and TherEx - see notes above for details.  Strengths: Able to follow instructions, Alert and oriented,  Effective communication skills, Good insight into deficits/needs, Manages pain appropriately, Motivated for self care and independence, Pleasant and cooperative, Supportive family, Willingly participates in therapeutic activities  Barriers: Decreased endurance, Generalized weakness, Limited mobility, Pain    Plan    AM shower tomorrow morning.  Standing tolerance, functional mobility/transfers with ADL pursuits, modified ADLs, light IADLs (laundry/simple meal prep), and strengthening to maximize safety and independence with ADL/IADL pursuits.    Passport items to be completed:  Passport items to be completed:  Perform bathroom transfers, complete dressing, complete feeding, get ready for the day, prepare a simple meal, participate in household tasks, adapt home for safety needs, demonstrate home exercise program, complete caregiver training     Occupational Therapy Goals (Active)     Problem: Dressing     Dates: Start: 08/07/21       Goal: STG-Within one week, patient will dress LB     Dates: Start: 08/07/21       Description: 1) Individualized Goal:  at a CGA with AE PRN.             Problem: Functional Transfers     Dates: Start: 08/07/21       Goal: STG-Within one week, patient will transfer to tub/shower     Dates: Start: 08/07/21       Description: 1) Individualized Goal:  with tub shower bench at a CGA level with AE/AD PRN.           Problem: OT Long Term Goals     Dates: Start: 08/07/21       Goal: LTG-By discharge, patient will complete basic self care tasks     Dates: Start: 08/07/21       Description: 1) Individualized Goal:  at a Min A to Mod I level with use of AE/AD PRN.            Goal: LTG-By discharge, patient will perform bathroom transfers     Dates: Start: 08/07/21       Description: 1) Individualized Goal:  at a Min A to Mod I level with AD/ DME PRN.

## 2021-08-12 NOTE — PROGRESS NOTES
[facility] Medicine Daily Progress Note    Date of Service  8/12/2021    Chief Complaint:  Chest pain  Hypertension    Interval History:  No significant events or changes since last visit    Review of Systems  Review of Systems   Constitutional: Negative for fever.   Eyes: Negative for blurred vision.   Respiratory: Negative for cough.    Cardiovascular: Negative for chest pain.   Gastrointestinal: Negative for diarrhea.   Musculoskeletal: Negative for joint pain.   Neurological: Negative for dizziness.   Psychiatric/Behavioral: The patient is not nervous/anxious.         Physical Exam  Temp:  [36.6 °C (97.8 °F)-36.8 °C (98.2 °F)] 36.6 °C (97.8 °F)  Pulse:  [68-78] 74  Resp:  [12-16] 16  BP: (125-145)/(74-88) 130/86  SpO2:  [91 %-98 %] 93 %    Physical Exam  Vitals and nursing note reviewed.   Constitutional:       Appearance: He is not diaphoretic.   HENT:      Mouth/Throat:      Pharynx: No oropharyngeal exudate.   Eyes:      Extraocular Movements: Extraocular movements intact.   Neck:      Vascular: No carotid bruit.   Cardiovascular:      Rate and Rhythm: Normal rate and regular rhythm.      Heart sounds: No murmur heard.     Pulmonary:      Effort: Pulmonary effort is normal.      Breath sounds: Normal breath sounds. No stridor.   Abdominal:      General: Bowel sounds are normal.      Palpations: Abdomen is soft.   Musculoskeletal:      Right lower leg: No edema.      Left lower leg: No edema.   Skin:     General: Skin is warm and dry.      Findings: No rash.   Neurological:      Mental Status: He is alert and oriented to person, place, and time.   Psychiatric:         Mood and Affect: Mood normal.         Behavior: Behavior normal.         Fluids    Intake/Output Summary (Last 24 hours) at 8/12/2021 0929  Last data filed at 8/12/2021 0541  Gross per 24 hour   Intake --   Output 550 ml   Net -550 ml       Laboratory                        Imaging      Assessment/Plan  * Closed left hip fracture (HCC)- (present on  admission)  Assessment & Plan  S/P surgical repair (8/4)    Vitamin D insufficiency  Assessment & Plan  Vit D: 28  On supplements    Chest pain  Assessment & Plan  Resolved  Developed CP on 8/10: located substernally, no radiation, pressure like, lasted 30 minutes  Had assoc of nausea, chills & sweats, shakes  Relieved with SL Nitro  On Nitro prn  On Imdur  EKG: no changes from prior  Trop 16  Note: has hx of recurrent angina     CAD (coronary artery disease) of artery bypass graft- (present on admission)  Assessment & Plan  Has Hx of CABG x 2 vessels  Has hx of 6 cardiac stents  On Lopressor  On Imdur  On Plavix  On Lipitor  Note: will be starting Lisinopril    Essential hypertension- (present on admission)  Assessment & Plan  BP better recently after med change   Off Clonidine qam  On Lopressor -- dose increased recently  On Lisinopril  Note: on Flomax  Cont to monitor    Hypothyroidism- (present on admission)  Assessment & Plan  TSH wnl (8/7)  On Synthroid

## 2021-08-12 NOTE — CARE PLAN
Problem: Fall Risk - Rehab  Goal: Patient will remain free from falls  Outcome: ProgressingPatient calls for assist with all transfers. No unsafe behaviors noted.      Problem: Incision Care  Goal: Optimal post surgical incision care  Outcome: ProgressingIncision without redness, swelling or drainage and skin edges are approximated       Problem: Pain - Standard  Goal: Alleviation of pain or a reduction in pain to the patient’s comfort goal  Outcome: Progressing  No prn pain meds so far today. He takes tylenol and long acting pain med with good control of pain. Patient able to verbalize pain on a 0/10 scale. Patients pain is being controlled with prn pain medications and rest.

## 2021-08-13 PROBLEM — R07.9 CHEST PAIN: Status: RESOLVED | Noted: 2021-08-10 | Resolved: 2021-08-13

## 2021-08-13 PROCEDURE — 700111 HCHG RX REV CODE 636 W/ 250 OVERRIDE (IP): Performed by: PHYSICAL MEDICINE & REHABILITATION

## 2021-08-13 PROCEDURE — A9270 NON-COVERED ITEM OR SERVICE: HCPCS | Performed by: PHYSICAL MEDICINE & REHABILITATION

## 2021-08-13 PROCEDURE — 700102 HCHG RX REV CODE 250 W/ 637 OVERRIDE(OP): Performed by: HOSPITALIST

## 2021-08-13 PROCEDURE — 97535 SELF CARE MNGMENT TRAINING: CPT

## 2021-08-13 PROCEDURE — 97530 THERAPEUTIC ACTIVITIES: CPT

## 2021-08-13 PROCEDURE — 770010 HCHG ROOM/CARE - REHAB SEMI PRIVAT*

## 2021-08-13 PROCEDURE — 94760 N-INVAS EAR/PLS OXIMETRY 1: CPT

## 2021-08-13 PROCEDURE — 99232 SBSQ HOSP IP/OBS MODERATE 35: CPT | Performed by: HOSPITALIST

## 2021-08-13 PROCEDURE — 97116 GAIT TRAINING THERAPY: CPT

## 2021-08-13 PROCEDURE — A9270 NON-COVERED ITEM OR SERVICE: HCPCS | Performed by: HOSPITALIST

## 2021-08-13 PROCEDURE — 97110 THERAPEUTIC EXERCISES: CPT

## 2021-08-13 PROCEDURE — 700102 HCHG RX REV CODE 250 W/ 637 OVERRIDE(OP): Performed by: PHYSICAL MEDICINE & REHABILITATION

## 2021-08-13 PROCEDURE — 99233 SBSQ HOSP IP/OBS HIGH 50: CPT | Performed by: PHYSICAL MEDICINE & REHABILITATION

## 2021-08-13 PROCEDURE — 97112 NEUROMUSCULAR REEDUCATION: CPT

## 2021-08-13 RX ORDER — METOPROLOL TARTRATE 50 MG/1
75 TABLET, FILM COATED ORAL 2 TIMES DAILY
Qty: 180 TABLET | Refills: 2 | Status: SHIPPED | OUTPATIENT
Start: 2021-08-13 | End: 2021-12-29

## 2021-08-13 RX ORDER — MORPHINE SULFATE 15 MG/1
15 TABLET ORAL 4 TIMES DAILY
Qty: 56 TABLET | Refills: 0 | Status: SHIPPED | OUTPATIENT
Start: 2021-08-13 | End: 2021-08-27

## 2021-08-13 RX ORDER — ISOSORBIDE MONONITRATE 60 MG/1
60 TABLET, EXTENDED RELEASE ORAL 2 TIMES DAILY
Qty: 180 TABLET | Refills: 2 | Status: SHIPPED | OUTPATIENT
Start: 2021-08-13 | End: 2022-11-22 | Stop reason: SDUPTHER

## 2021-08-13 RX ORDER — ATORVASTATIN CALCIUM 40 MG/1
40 TABLET, FILM COATED ORAL DAILY
Qty: 90 TABLET | Refills: 2 | Status: SHIPPED | OUTPATIENT
Start: 2021-08-13 | End: 2023-09-19 | Stop reason: SDUPTHER

## 2021-08-13 RX ORDER — CYCLOBENZAPRINE HCL 10 MG
10 TABLET ORAL 3 TIMES DAILY PRN
Qty: 30 TABLET | Refills: 0 | Status: SHIPPED | OUTPATIENT
Start: 2021-08-13

## 2021-08-13 RX ORDER — FAMOTIDINE 40 MG/1
40 TABLET, FILM COATED ORAL EVERY EVENING
Qty: 90 TABLET | Refills: 2 | Status: SHIPPED | OUTPATIENT
Start: 2021-08-13 | End: 2022-11-22

## 2021-08-13 RX ORDER — TAMSULOSIN HYDROCHLORIDE 0.4 MG/1
0.4 CAPSULE ORAL EVERY EVENING
Qty: 90 CAPSULE | Refills: 2 | Status: SHIPPED | OUTPATIENT
Start: 2021-08-13

## 2021-08-13 RX ORDER — LISINOPRIL 40 MG/1
40 TABLET ORAL DAILY
Qty: 90 TABLET | Refills: 2 | Status: SHIPPED | OUTPATIENT
Start: 2021-08-14 | End: 2022-01-07 | Stop reason: SDUPTHER

## 2021-08-13 RX ORDER — CLOPIDOGREL BISULFATE 75 MG/1
75 TABLET ORAL EVERY MORNING
Qty: 90 TABLET | Refills: 2 | Status: SHIPPED | OUTPATIENT
Start: 2021-08-13 | End: 2023-09-19 | Stop reason: SDUPTHER

## 2021-08-13 RX ORDER — CLONIDINE HYDROCHLORIDE 0.1 MG/1
0.1 TABLET ORAL EVERY MORNING
Qty: 90 TABLET | Refills: 2 | Status: SHIPPED | OUTPATIENT
Start: 2021-08-13 | End: 2022-01-07 | Stop reason: SDUPTHER

## 2021-08-13 RX ADMIN — Medication 1000 UNITS: at 09:57

## 2021-08-13 RX ADMIN — LISINOPRIL 40 MG: 20 TABLET ORAL at 05:16

## 2021-08-13 RX ADMIN — ISOSORBIDE MONONITRATE 60 MG: 30 TABLET, EXTENDED RELEASE ORAL at 21:24

## 2021-08-13 RX ADMIN — CLOPIDOGREL BISULFATE 75 MG: 75 TABLET ORAL at 08:36

## 2021-08-13 RX ADMIN — MORPHINE SULFATE 15 MG: 15 TABLET ORAL at 17:50

## 2021-08-13 RX ADMIN — ACETAMINOPHEN 650 MG: 325 TABLET ORAL at 08:37

## 2021-08-13 RX ADMIN — SERTRALINE HYDROCHLORIDE 50 MG: 50 TABLET ORAL at 08:37

## 2021-08-13 RX ADMIN — FAMOTIDINE 40 MG: 20 TABLET ORAL at 21:24

## 2021-08-13 RX ADMIN — METOPROLOL TARTRATE 75 MG: 25 TABLET, FILM COATED ORAL at 05:16

## 2021-08-13 RX ADMIN — ENOXAPARIN SODIUM 40 MG: 40 INJECTION SUBCUTANEOUS at 08:38

## 2021-08-13 RX ADMIN — ATORVASTATIN CALCIUM 40 MG: 40 TABLET, FILM COATED ORAL at 21:25

## 2021-08-13 RX ADMIN — MORPHINE SULFATE 15 MG: 15 TABLET ORAL at 11:57

## 2021-08-13 RX ADMIN — TAMSULOSIN HYDROCHLORIDE 0.4 MG: 0.4 CAPSULE ORAL at 21:24

## 2021-08-13 RX ADMIN — SENNOSIDES AND DOCUSATE SODIUM 2 TABLET: 8.6; 5 TABLET ORAL at 21:24

## 2021-08-13 RX ADMIN — SENNOSIDES AND DOCUSATE SODIUM 2 TABLET: 8.6; 5 TABLET ORAL at 08:36

## 2021-08-13 RX ADMIN — ACETAMINOPHEN 650 MG: 325 TABLET ORAL at 21:25

## 2021-08-13 RX ADMIN — MORPHINE SULFATE 15 MG: 15 TABLET ORAL at 05:16

## 2021-08-13 RX ADMIN — ACETAMINOPHEN 650 MG: 325 TABLET ORAL at 16:52

## 2021-08-13 RX ADMIN — LEVOTHYROXINE SODIUM 25 MCG: 25 TABLET ORAL at 05:16

## 2021-08-13 RX ADMIN — METOPROLOL TARTRATE 75 MG: 25 TABLET, FILM COATED ORAL at 17:50

## 2021-08-13 RX ADMIN — SERTRALINE HYDROCHLORIDE 100 MG: 50 TABLET ORAL at 21:24

## 2021-08-13 RX ADMIN — ISOSORBIDE MONONITRATE 60 MG: 30 TABLET, EXTENDED RELEASE ORAL at 08:36

## 2021-08-13 RX ADMIN — OMEPRAZOLE 20 MG: 20 CAPSULE, DELAYED RELEASE ORAL at 08:37

## 2021-08-13 RX ADMIN — MORPHINE SULFATE 15 MG: 15 TABLET ORAL at 23:17

## 2021-08-13 ASSESSMENT — ACTIVITIES OF DAILY LIVING (ADL)
TOILETING_LEVEL_OF_ASSIST_DESCRIPTION: GRAB BAR;INCREASED TIME
TOILET_TRANSFER_DESCRIPTION: GRAB BAR;SUPERVISION FOR SAFETY
TOILETING_LEVEL_OF_ASSIST_DESCRIPTION: GRAB BAR;INCREASED TIME
TUB_SHOWER_TRANSFER_DESCRIPTION: GRAB BAR;SUPERVISION FOR SAFETY;ADAPTIVE EQUIPMENT
TOILET_TRANSFER_DESCRIPTION: GRAB BAR;SUPERVISION FOR SAFETY

## 2021-08-13 ASSESSMENT — GAIT ASSESSMENTS
DISTANCE (FEET): 250
DEVIATION: DECREASED BASE OF SUPPORT;BRADYKINETIC
ASSISTIVE DEVICE: FRONT WHEEL WALKER
GAIT LEVEL OF ASSIST: SUPERVISED
GAIT LEVEL OF ASSIST: STAND BY ASSIST
DISTANCE (FEET): 215
DEVIATION: DECREASED BASE OF SUPPORT;BRADYKINETIC
ASSISTIVE DEVICE: FRONT WHEEL WALKER

## 2021-08-13 ASSESSMENT — ENCOUNTER SYMPTOMS
VOMITING: 0
FEVER: 0
CHILLS: 0
NERVOUS/ANXIOUS: 0
ABDOMINAL PAIN: 0
DIARRHEA: 0
NAUSEA: 0
SHORTNESS OF BREATH: 0

## 2021-08-13 ASSESSMENT — PAIN DESCRIPTION - PAIN TYPE: TYPE: ACUTE PAIN

## 2021-08-13 NOTE — DISCHARGE PLANNING
Case Management Discharge Instructions        Discharge Location: Home with Outpatient Physical Therapy     Agency Name / Address / Phone:   [facility]          [address] ~ [place], [place] [id number] [phone]           Follow-up Information:     Suman Hope D.O.  Primary Care Provider   [address] [place] [address], [address]   [facility] [id number] [phone]   Thursday 8/26/2021 appointment at 2:15pm.       Valentin Ríos M.D.  Orthopedic Surgeon   [address] [place] [id number] [phone]   Please call to schedule a follow-up appointment.       Ina Marie D.O.  Physical Medicine & Rehab   [address] [facility], [address]   [facility] [address] [phone]   Monday 8/30/2021 appointment at 4:00pm.  Check in at 3:30pm.

## 2021-08-13 NOTE — DISCHARGE SUMMARY
Rehab Discharge Summary    Admission Date: 8/6/2021    Discharge Date: 8/15/2021     Attending Provider: Rosas Shah DO for Romi Singh MD/PhD    Admission Diagnosis:   Active Hospital Problems    Diagnosis    • *Closed left hip fracture (HCC)    • Chest pain    • Vitamin D insufficiency    • CAD (coronary artery disease) of artery bypass graft    • COPD (chronic obstructive pulmonary disease) (HCC)    • Essential hypertension    • GERD (gastroesophageal reflux disease)    • Hypothyroidism        Discharge Diagnosis:  Active Hospital Problems    Diagnosis    • *Closed left hip fracture (HCC)    • Chest pain    • Vitamin D insufficiency    • CAD (coronary artery disease) of artery bypass graft    • COPD (chronic obstructive pulmonary disease) (HCC)    • Essential hypertension    • GERD (gastroesophageal reflux disease)    • Hypothyroidism        HPI per H&P:  The patient is a 66 y.o. male with a past medical history of CAD, COPD, HTN, Hypothyroidism, and back pain who presented on 8/4/21 with mechanical ground level fall. He presented to outside clinic in [place] and found to have left hip fracture.  Patient reportedly is limited in ambulation due to back pain and he tripped over his dog when walking with his walker.  Orthopedics was consulted and recommended surgical intervention. Patient underwent left intramedullary device with Dr. Ríos on 8/4/21.  Post-operatively patient's course has been complicated by anemia, leukocytosis, thrombocytopenia and left leg numbness. Patient also had right wrist pain from the fall.  XR of his right wrist were negative for fracture.     Patient was admitted to [facility] on 8/6/2021.     Hospital Course by Problem List:  Left hip fracture - GLF on 8/4/21 due to dog found to have left hip fracture. Patient is now s/p intramedullary device with Dr. Ríos on 8/4/21. WBAT. Has some numbness to left leg, will monitor.  Patient underwent acute  inpatient rehabilitation from 8/6/21 to 8/15/21 with good improvement in mobility and ADLs.   -Follow-up Orthopedic surgery.      Post-op/Back pain - Patient reports having pain contract for Morphine 15 mg q6h. Will continue  - PRN Tylenol. Continue Morphine QID, will have appointment with pain prescriber. Provided 2 week Rx  I discussed the risks and benefits of using opiate medications for pain control.  I discussed the risk of addiction, potential for overdose, and respiratory depression (and the potential need for opiate antagonist therapy if this occurs).  I encouraged the patient to take this medication sparingly with the expressed goal of weaning off the medication as soon as is clinically appropriate.  I informed the patient that we are only able to provide a 14 day supply of these medications at discharge and that they will be responsible for requesting any refills needed from their primary care provider or their surgeon.  We discussed the need to safely secure these medications to prevent theft, inadvertent ingestion, or misuse.  Any unused medication should be immediately disposed of through a sanctioned medication disposal program.  We discussed adjunctive pain medications and conservative therapies at length. I answered the patient's questions regarding this treatment, and the patient indicated understanding and willingness to proceed.     Eye drainage-  Bilateral eyes with drainage. No visual changes. Start Erythromycin QID for 3 days     CAD/HTN - Patient on Clonidine 0.1 mg daily, Imdur 60 mg BID, Metoprolol 50 mg BID, Lisinopril 20 mg.  Patient on Plavix 75 mg daily. Will monitor   -SBP into 150s, increase Metoprolol. Increase Lisinopril to 40 mg. Follow-up PCP     Chest pain - Patient with diagnosis of angina and has nitro tabs.  Check EKG and consult hospitalist     HLD - Patient on Atorvastatin 40 mg QHS     COPD - Not on medications. Will monitor      Anemia - Check AM CBC - 10.3,  stable     Leukocytosis - May be reactionary, check AM CBC - 11.8 on 8/7 then normal on 8/8. Will monitor.      Hypothyroidism - Patient on Levothyroxine 25 mcg M-F and 50 mcg Sun-Sat     Depression/Sleep - Patient on Zoloft 50/100 mg      BPH - Patient on Flomax on transfer.      DVT Ppx - Patient has been cleared for Lovenox. Ambulating > 150 feet, discontinue    Functional Status at Discharge  Eating:  Modified Independent  Eating Description:  Insert dentures, Increased time  Grooming:  Modified Independent, Seated  Grooming Description:  Increased time, Seated in wheelchair at sink  Bathing:  Contact Guard Assist  Bathing Description:  Grab bar, Hand held shower, Long handled bath tool, Tub bench, Assit with back, Assit wtih lower extremities, Increased time, Supervision for safety, Set up for wound protection  Upper Body Dressing:  Independent  Upper Body Dressing Description:  Increased time, Assist with pulling shirt over head  Lower Body Dressing:  Supervision  Lower Body Dressing Description:  Dressing stick, Reacher, Sock aid     Walk:  Supervised  Distance Walked:  250  Number of Times Distance Was Traveled:  1  Assistive Device:  Front Wheel Walker  Gait Deviation:  Decreased Base Of Support, Bradykinetic  Wheelchair:   (SPV-IND)  Distance Propelled:  3 reps indoors of distances between 100-150 feet without fatigue noted   Wheelchair Description:  Extra time, Verbal cueing (cueing for path finding prn)  Stairs  (pt politely declines; reports has ramp in front of home)  Stairs Description  (pt politely declines; reports has ramp in front of home)     Comprehension:  Modified Independent  Comprehension Description:  Glasses  Expression:  Independent  Expression Description:     Social Interaction:     Social Interaction Description:     Problem Solving:  Modified Independent  Problem Solving Description:  Therapy schedule  Memory:  Modified Independent  Memory Description:  Therapy schedule       I, Rosas  DO Pablo, personally performed a complete drug regimen review and no potential clinically significant medication issues were identified.   Discharge Medication:     Medication List      START taking these medications      Instructions   lisinopril 40 MG tablet  Start taking on: August 14, 2021  Commonly known as: PRINIVIL   Take 1 Tablet by mouth every day.  Dose: 40 mg        CHANGE how you take these medications      Instructions   atorvastatin 40 MG Tabs  What changed: when to take this  Commonly known as: LIPITOR   Take 1 Tablet by mouth every day.  Dose: 40 mg     metoprolol tartrate 50 MG Tabs  What changed: how much to take  Commonly known as: LOPRESSOR   Take 1.5 Tablets by mouth 2 times a day.  Dose: 75 mg     morphine 15 MG tablet  What changed: when to take this  Commonly known as: MS IR   Take 1 Tablet by mouth 4 times a day for 14 days.  Dose: 15 mg        CONTINUE taking these medications      Instructions   albuterol 108 (90 Base) MCG/ACT Aers inhalation aerosol   Inhale 2 Puffs every 6 hours as needed for Shortness of Breath.  Dose: 2 Puff     cloNIDine 0.1 MG Tabs  Commonly known as: CATAPRES   Take 1 Tablet by mouth every morning.  Dose: 0.1 mg     clopidogrel 75 MG Tabs  Commonly known as: PLAVIX   Take 1 Tablet by mouth every morning.  Dose: 75 mg     cyclobenzaprine 10 mg Tabs  Commonly known as: Flexeril   Take 1 Tablet by mouth 3 times a day as needed for Moderate Pain.  Dose: 10 mg     EpiPen 2-Jason 0.3 MG/0.3ML Soaj solution for injection  Generic drug: EPINEPHrine   Inject 0.3 mg into the shoulder, thigh, or buttocks as needed.  Dose: 0.3 mg     famotidine 40 MG Tabs  Commonly known as: PEPCID   Take 1 Tablet by mouth every evening.  Dose: 40 mg     gabapentin 100 MG Caps  Commonly known as: NEURONTIN   Take 100 mg by mouth 4 times a day.  Dose: 100 mg     isosorbide mononitrate SR 60 MG Tb24  Commonly known as: IMDUR   Take 1 Tablet by mouth 2 times a day.  Dose: 60 mg     *  levothyroxine 25 MCG Tabs  Commonly known as: SYNTHROID   Take 25 mcg by mouth see administration instructions. Monday thru Friday only  Dose: 25 mcg     * levothyroxine 50 MCG Tabs  Commonly known as: SYNTHROID   Take 50 mcg by mouth two times a week. Saturday & Sunday Only  Dose: 50 mcg     nitroglycerin 0.4 MG Subl  Commonly known as: NITROSTAT   Place 0.4 mg under the tongue every 5 minutes as needed for Chest Pain.  Dose: 0.4 mg     * sertraline 100 MG Tabs  Commonly known as: Zoloft   Take 100 mg by mouth every evening.  Dose: 100 mg     * sertraline 50 MG Tabs  Commonly known as: Zoloft   Take 50 mg by mouth every morning.  Dose: 50 mg     tamsulosin 0.4 MG capsule  Commonly known as: FLOMAX   Take 1 Capsule by mouth every evening.  Dose: 0.4 mg         * This list has 4 medication(s) that are the same as other medications prescribed for you. Read the directions carefully, and ask your doctor or other care provider to review them with you.            STOP taking these medications    Fioricet -40 MG Caps capsule  Generic drug: acetaminophen/caffeine/butalbital 300-40-50 mg     metoclopramide 5 MG/5ML Soln  Commonly known as: REGLAN     pantoprazole 40 MG Tbec  Commonly known as: PROTONIX     potassium chloride SA 20 MEQ Tbcr  Commonly known as: Kdur            Discharge Diet:  Regular    Discharge Activity:  As tolerated     Disposition:  Patient to discharge home with family support and community resources.     Equipment:  FWW    Follow-up & Discharge Instructions:  Follow up with your primary care provider (PCP) within 7-10 days of discharge to review your medications and take over your care.     If you develop chest pain, fever, chills, change in neurologic function (weakness, sensation changes, vision changes), or other concerning sxs, seek immediate medical attention or call 911.      Condition on Discharge:  Good    More than 35 minutes was spent on discharging this patient, including face-to-face  time, prescription management, and the dictation of this note.    Rosas Shah,    Physical Medicine and Rehabilitation     Date of Service: 8/15/2021

## 2021-08-13 NOTE — THERAPY
Physical Therapy   Daily Treatment     Patient Name: Antony Akbar  Age:  66 y.o., Sex:  male  Medical Record #: [id number]  Today's Date: 8/13/2021     Precautions  Precautions: Fall Risk, Weight Bearing As Tolerated Left Lower Extremity  Comments: pacemaker     Subjective    Pt seated in therapy gym, agreeable to PT.      Objective       08/13/21 1001   Precautions   Precautions Fall Risk;Weight Bearing As Tolerated Left Lower Extremity   Comments pacemaker    Gait Functional Level of Assist    Gait Level Of Assist Supervised   Assistive Device Front Wheel Walker   Distance (Feet) 250   # of Times Distance was Traveled 1   Deviation Decreased Base Of Support;Bradykinetic   Sitting Lower Body Exercises   Nustep Resistance Level 4  (10 min, 421 steps)   Interdisciplinary Plan of Care Collaboration   IDT Collaboration with  Recreation Therapist   Patient Position at End of Therapy Seated;Call Light within Reach;Tray Table within Reach   Collaboration Comments pt care passed from rec therapist    PT Total Time Spent   PT Individual Total Time Spent (Mins) 30   PT Charge Group   PT Therapeutic Exercise 1   PT Therapeutic Activities 1       Assessment    Pt highly motivated and fully participatory in session. Good endurance demonstrated with ambulation this session.     Strengths: Able to follow instructions, Effective communication skills, Good insight into deficits/needs, Pleasant and cooperative, Supportive family, Willingly participates in therapeutic activities  Barriers: Decreased endurance, Generalized weakness, Impaired activity tolerance, Impaired balance    Plan    FWW ambulation, outdoors as weather allows; seated HEP for BLE strengthening and endurance (limited chronic R knee X); safety with decreased A for transfers; review ramps.  D/C on Navi 8/15/21.        Physical Therapy Problems (Active)     Problem: Mobility     Dates: Start: 08/10/21       Goal: STG-Within one week, patient will ambulate community  distances with FWW or 4WW and Mod I to safely navigate his home environment     Dates: Start: 08/10/21          Goal: STG-Within one week, patient will ascend/descend an ADA ramp with 4WW or FWW and SPV to safely enter/exit his home     Dates: Start: 08/10/21             Problem: Mobility Transfers     Dates: Start: 08/07/21       Goal: STG-Within one week, patient will transfer bed to chair     Dates: Start: 08/07/21       Goal Note filed on 08/10/21 1116 by Shyam Hollins, PT     Patient currently uses CGA-SBA with FWW for transfers instead of SPV.            Goal: STG-Within one week, patient will transfer in/out of car SPV from ambulatory level with FWW or 4WW     Dates: Start: 08/10/21             Problem: PT-Long Term Goals     Dates: Start: 08/07/21       Goal: LTG-By discharge, patient will ambulate     Dates: Start: 08/07/21       Goal Note filed on 08/07/21 1608 by Teri Vega DPT     1) Individualized goal:   >55' with FWW or 4WW and Mod I to safely navigate his home environment  2) Interventions: PT Group Therapy, PT Gait Training, PT Self Care/Home Eval, PT Therapeutic Exercises, PT Neuro Re-Ed/Balance, PT Therapeutic Activity, and PT Evaluation            Goal: LTG-By discharge, patient will transfer one surface to another     Dates: Start: 08/07/21       Goal Note filed on 08/07/21 1608 by Teri Vega DPT     1) Individualized goal:   Mod I with FWW or 4WW  2) Interventions:  PT Group Therapy, PT Gait Training, PT Self Care/Home Eval, PT Therapeutic Exercises, PT Neuro Re-Ed/Balance, PT Therapeutic Activity, and PT Evaluation            Goal: LTG-By discharge, patient will transfer in/out of a car     Dates: Start: 08/07/21       Goal Note filed on 08/07/21 1608 by Teri Vega DPT     1) Individualized goal:   SPV from ambulatory level with FWW or 4WW  2) Interventions:  PT Group Therapy, PT Gait Training, PT Self Care/Home Eval, PT Therapeutic Exercises, PT Neuro  Re-Ed/Balance, PT Therapeutic Activity, and PT Evaluation            Goal: LTG-By discharge, patient will     Dates: Start: 08/07/21       Goal Note filed on 08/07/21 4082 by Teri Vega DPT     1) Individualized goal:   ascend/descend an ADA ramp with 4WW or FWW and SPV to safely enter/exit his home  2) Interventions:  PT Group Therapy, PT Gait Training, PT Self Care/Home Eval, PT Therapeutic Exercises, PT Neuro Re-Ed/Balance, PT Therapeutic Activity, and PT Evaluation

## 2021-08-13 NOTE — THERAPY
Physical Therapy   Daily Treatment     Patient Name: Antony Akbar  Age:  66 y.o., Sex:  male  Medical Record #: [id number]  Today's Date: 8/13/2021     Precautions  Precautions: Fall Risk, Weight Bearing As Tolerated Left Lower Extremity  Comments: Pacemaker    Subjective    Patient agreeable to PT.     Objective       08/13/21 1501   Gait Functional Level of Assist    Gait Level Of Assist Stand by Assist  (SPV-SBA)   Assistive Device Front Wheel Walker   Distance (Feet) 215  (performed at end of session)   # of Times Distance was Traveled 1   Deviation Decreased Base Of Support;Bradykinetic   Wheelchair Functional Level of Assist   Wheelchair Assist Modified Independent   Distance Wheelchair (Feet or Distance) 150 feet without fatigue noted   Wheelchair Description Extra time   Stairs Functional Level of Assist   Level of Assist with Stairs Unable to Participate   # of Stairs Climbed 0   Stairs Description Safety concerns   Bed Mobility    Sit to Stand Stand by Assist   Scooting Supervised   Neuro-Muscular Treatments   Comments Standing balance for 3 sets of 60-90 seconds with wide HUONG on Airex foam surface, 3 sets of 75-90 seconds with narrow HUONG on firm surface; CGA for both at gait belt with // bars present but only used during setup.  Seated breaks every 2 sets.  Also performed 2x 10 hip flexion ea LE with 2 seated breaks between sets; CGA-Min A at gait belt with only 1 UE support on // bars, no LOB or knee buckling.   Interdisciplinary Plan of Care Collaboration   IDT Collaboration with  Nursing   Patient Position at End of Therapy Seated;Chair Alarm On;Self Releasing Lap Belt Applied;Call Light within Reach;Tray Table within Reach;Phone within Reach   Collaboration Comments RN to provide pain meds admin after session   PT Total Time Spent   PT Individual Total Time Spent (Mins) 60   PT Charge Group   PT Gait Training 1   PT Neuromuscular Re-Education / Balance 2   PT Therapeutic Activities 1     Reviewed pt's  POC, treatment goals, discussed and reviewed pt's passport and plan for tomorrow, reviewed education on activity pacing, discussed safety and guarding, and discussed pt being able to request additional A if needed, reviewed anxiety eefect on increased fall risk, and reviewed pt's progress and CLOF.    Assessment    Patient continues to have pain but manges activity level and takes rest breaks as appropriate; great motivation and positive outlook throughout session; good cognition; improving activity tolerance continues.    Strengths: Able to follow instructions, Effective communication skills, Good insight into deficits/needs, Pleasant and cooperative, Supportive family, Willingly participates in therapeutic activities  Barriers: Decreased endurance, Generalized weakness, Impaired activity tolerance, Impaired balance    Plan    Patient passport, d/c paperwork, fall facts handout.  Pt to d/c on Navi 8/15/21.    Passport items to be completed:  Passport items to be completed:  Get in/out of bed safely, in/out of a vehicle, safely use mobility device, walk or wheel around home/community, navigate up and down stairs, show how to get up/down from the ground, ensure home is accessible, demonstrate HEP, complete caregiver training    Physical Therapy Problems (Active)     Problem: Mobility     Dates: Start: 08/10/21       Goal: STG-Within one week, patient will ambulate community distances with FWW or 4WW and Mod I to safely navigate his home environment     Dates: Start: 08/10/21          Goal: STG-Within one week, patient will ascend/descend an ADA ramp with 4WW or FWW and SPV to safely enter/exit his home     Dates: Start: 08/10/21             Problem: Mobility Transfers     Dates: Start: 08/07/21       Goal: STG-Within one week, patient will transfer bed to chair     Dates: Start: 08/07/21       Goal Note filed on 08/10/21 1116 by Shyam Hollins, PT     Patient currently uses CGA-SBA with FWW for transfers  instead of SPV.            Goal: STG-Within one week, patient will transfer in/out of car SPV from ambulatory level with FWW or 4WW     Dates: Start: 08/10/21             Problem: PT-Long Term Goals     Dates: Start: 08/07/21       Goal: LTG-By discharge, patient will ambulate     Dates: Start: 08/07/21       Goal Note filed on 08/07/21 1608 by Teri Vega DPT     1) Individualized goal:   >55' with FWW or 4WW and Mod I to safely navigate his home environment  2) Interventions: PT Group Therapy, PT Gait Training, PT Self Care/Home Eval, PT Therapeutic Exercises, PT Neuro Re-Ed/Balance, PT Therapeutic Activity, and PT Evaluation            Goal: LTG-By discharge, patient will transfer one surface to another     Dates: Start: 08/07/21       Goal Note filed on 08/07/21 1608 by Teri Vega DPT     1) Individualized goal:   Mod I with FWW or 4WW  2) Interventions:  PT Group Therapy, PT Gait Training, PT Self Care/Home Eval, PT Therapeutic Exercises, PT Neuro Re-Ed/Balance, PT Therapeutic Activity, and PT Evaluation            Goal: LTG-By discharge, patient will transfer in/out of a car     Dates: Start: 08/07/21       Goal Note filed on 08/07/21 1608 by Teri Vega DPT     1) Individualized goal:   SPV from ambulatory level with FWW or 4WW  2) Interventions:  PT Group Therapy, PT Gait Training, PT Self Care/Home Eval, PT Therapeutic Exercises, PT Neuro Re-Ed/Balance, PT Therapeutic Activity, and PT Evaluation            Goal: LTG-By discharge, patient will     Dates: Start: 08/07/21       Goal Note filed on 08/07/21 1608 by Teri Vega DPT     1) Individualized goal:   ascend/descend an ADA ramp with 4WW or FWW and SPV to safely enter/exit his home  2) Interventions:  PT Group Therapy, PT Gait Training, PT Self Care/Home Eval, PT Therapeutic Exercises, PT Neuro Re-Ed/Balance, PT Therapeutic Activity, and PT Evaluation

## 2021-08-13 NOTE — THERAPY
"Recreational Therapy  Daily Treatment     Patient Name: Antony Akbar  AGE:  66 y.o., SEX:  male  Medical Record #: [id number]  Today's Date: 8/13/2021       Subjective    Pt sharing that he would like to purchase the [employer] system as he enjoyed the games.      Objective       08/13/21 0931   Functional Ability Status - Cognitive   Attention Span Remains on Task   Comprehension Follows Three Step Commands   Judgment Able to Make Independent Decisions   Functional Ability Status - Emotional    Affect Appropriate   Mood Appropriate   Behavior Appropriate   Skilled Intervention    Skilled Intervention Gross Motor Leisure;Relaxation / Coping Skills   Skilled Intervention Comments Sarah he   Interdisciplinary Plan of Care Collaboration   IDT Collaboration with  Physical Therapist   Patient Position at End of Therapy Seated   Collaboration Comments Transfered care to PT.    Strengths & Barriers   Strengths Able to follow instructions;Alert and oriented;Manages pain appropriately;Motivated for self care and independence;Pleasant and cooperative;Supportive family;Willingly participates in therapeutic activities   Barriers Fatigue;Decreased endurance   Treatment Time   Total Time Spent (mins) 30   Procedural Tracking   Procedural Tracking Community Re-Integration;Community Skills Development;Leisure Skills Awareness;Leisure Skills Development;Gross Motor Functional Leisure Skills       Assessment    Pt stood for 5 minutes x2 CGA. No LOB.     Strengths: (P) Able to follow instructions, Alert and oriented, Manages pain appropriately, Motivated for self care and independence, Pleasant and cooperative, Supportive family, Willingly participates in therapeutic activities  Barriers: (P) Fatigue, Decreased endurance    Plan    Standing endurance.     Passport items to be completed:  Passport items to be completed:  Verbalize two positive leisure activities, participate in community outing, know how to use \"To Go Kit\", discuss " returning to work, community groups or volunteer activities, explore community resources, schedule meeting with peer mentor

## 2021-08-13 NOTE — CARE PLAN
"  Problem: Fall Risk - Rehab  Goal: Patient will remain free from falls  Note: Corie Rm Fall risk Assessment Score: 15    High fall risk Interventions   - Bed and strip alarm   - Yellow sign by the door   - Yellow wrist band \"Fall risk\"  - Room near to the nurse station  - Do not leave patient unattended in the bathroom  - Fall risk education provided         Problem: Pain - Standard  Goal: Alleviation of pain or a reduction in pain to the patient’s comfort goal  Note: Medicated pt with scheduled MS Contin with good relief. Pillows provided for comfort. PRN pain meds available if needed     "

## 2021-08-13 NOTE — PROGRESS NOTES
[facility] Medicine Daily Progress Note    Date of Service  8/13/2021    Chief Complaint:  Chest pain  Hypertension    Interval History:  No significant events or changes since last visit    Review of Systems  Review of Systems   Constitutional: Negative for chills and fever.   Respiratory: Negative for shortness of breath.    Cardiovascular: Negative for chest pain.   Gastrointestinal: Negative for abdominal pain, diarrhea, nausea and vomiting.   Psychiatric/Behavioral: The patient is not nervous/anxious.         Physical Exam  Temp:  [36.6 °C (97.9 °F)-36.9 °C (98.4 °F)] 36.9 °C (98.4 °F)  Pulse:  [70-75] 71  Resp:  [12-17] 17  BP: (134-153)/(80-98) 153/98  SpO2:  [93 %] 93 %    Physical Exam  Vitals and nursing note reviewed.   Constitutional:       Appearance: Normal appearance.   HENT:      Head: Atraumatic.   Eyes:      Conjunctiva/sclera: Conjunctivae normal.      Pupils: Pupils are equal, round, and reactive to light.   Cardiovascular:      Rate and Rhythm: Normal rate and regular rhythm.   Pulmonary:      Effort: Pulmonary effort is normal.      Breath sounds: Normal breath sounds.   Abdominal:      General: Bowel sounds are normal.      Palpations: Abdomen is soft.   Musculoskeletal:      Cervical back: Normal range of motion.      Right lower leg: No edema.      Left lower leg: No edema.   Skin:     General: Skin is warm and dry.   Neurological:      Mental Status: He is alert and oriented to person, place, and time.   Psychiatric:         Mood and Affect: Mood normal.         Behavior: Behavior normal.         Fluids    Intake/Output Summary (Last 24 hours) at 8/13/2021 0981  Last data filed at 8/13/2021 0420  Gross per 24 hour   Intake 440 ml   Output 600 ml   Net -160 ml       Laboratory                        Imaging      Assessment/Plan  * Closed left hip fracture (HCC)- (present on admission)  Assessment & Plan  S/P surgical repair (8/4)    Vitamin D insufficiency  Assessment & Plan  Vit D: 28  On  supplements    Chest pain  Assessment & Plan  Resolved  Developed CP on 8/10: located substernally, no radiation, pressure like, lasted 30 minutes  Had assoc of nausea, chills & sweats, shakes  Relieved with SL Nitro  On Nitro prn  On Imdur  EKG: no changes from prior  Trop 16  Note: has hx of recurrent angina     CAD (coronary artery disease) of artery bypass graft- (present on admission)  Assessment & Plan  Has Hx of CABG x 2 vessels  Has hx of 6 cardiac stents  On Lopressor  On Imdur  On Plavix  On Lipitor  Note: will be starting Lisinopril    Essential hypertension- (present on admission)  Assessment & Plan  BP generally ok but occ rises up a little  On Lopressor  On Lisinopril -- dose recently increased  Note: on Flomax  Will monitor another day since meds were recently adjusted    Hypothyroidism- (present on admission)  Assessment & Plan  TSH wnl (8/7)  On Synthroid

## 2021-08-13 NOTE — DISCHARGE PLANNING
Case management Summary:   Met with patient to review CM DC Instructions prior to discharge on Sunday.   Reviewed follow up appointments with PCP, Ortho Surgeon and PM&R.   Referral made to [facility], acceptance is pending.    No new DME was ordered/delivered.    During hospitalization, I have provided support and education and have been available for questions and information during hours of operation, communicated with therapy team and MD along with providing links/resources  to outside services.    Patient verbalizes agreement with all plans and has an understanding of the next steps within the post acute services.     Individualized Goals:   1. Get home asap  2. Be as independent as before fall     Outcome:   1.  Goal met - patient discharged home on day 10  2.  Goal partially met - patient participated in therapies and improved in ability to complete ADLs.  3.

## 2021-08-13 NOTE — THERAPY
Occupational Therapy  Daily Treatment     Patient Name: Antony Akbar  Age:  66 y.o., Sex:  male  Medical Record #: [id number]  Today's Date: 8/13/2021     Precautions  Precautions: Fall Risk, Weight Bearing As Tolerated Left Lower Extremity  Comments: pacemaker          Subjective       Objective       08/13/21 0831   Precautions   Precautions Fall Risk;Weight Bearing As Tolerated Left Lower Extremity   Comments pacemaker   Vitals   O2 Delivery Device None - Room Air   Non Verbal Descriptors   Non Verbal Scale  Calm   Cognition    Level of Consciousness Alert   Sleep/Wake Cycle   Sleep & Rest Awake   Functional Level of Assist   Eating Modified Independent   Eating Description Insert dentures;Increased time   Grooming Modified Independent;Seated   Grooming Description Increased time;Seated in wheelchair at sink   Bathing Contact Guard Assist   Bathing Description Grab bar;Hand held shower;Long handled bath tool;Tub bench;Assit with back;Assit wtih lower extremities;Increased time;Supervision for safety;Set up for wound protection   Upper Body Dressing Independent   Lower Body Dressing Supervision   Lower Body Dressing Description Grab bar;Reacher;Sock aid;Increased time;Supervision for safety;Verbal cueing   Toileting Modified Independent   Toileting Description Grab bar;Increased time   Toilet Transfers Supervised   Toilet Transfer Description Grab bar;Supervision for safety   Tub / Shower Transfers Stand by Assist   Tub Shower Transfer Description Grab bar;Supervision for safety;Adaptive equipment   Comprehension Modified Independent   Comprehension Description Glasses   Expression Independent   Problem Solving Modified Independent   Problem Solving Description Therapy schedule   Memory Modified Independent   Memory Description Therapy schedule   Interdisciplinary Plan of Care Collaboration   IDT Collaboration with  Nursing;Certified Nursing Assistant   Patient Position at End of Therapy Seated;Chair Alarm On;Self  Releasing Lap Belt Applied;Call Light within Reach;Tray Table within Reach;Phone within Reach   Collaboration Comments Dressing change, CLOF   OT Total Time Spent   OT Individual Total Time Spent (Mins) 60   OT Charge Group   OT Self Care / ADL 60       Assessment    Pt was alert and cooperative w/ tx.  Tx emphasis on ADL's and integration of AE/DME for BADL's - see otes above for details - Mod I to doff socks/pants via dressing stick/reacher, Mod I to thread feet through pants via reacher, Mod I to don socks via socks aide, Sup/SBA in standing via grab bar to manage pants/briefs at waist .  Strengths: Able to follow instructions, Alert and oriented, Effective communication skills, Good insight into deficits/needs, Manages pain appropriately, Motivated for self care and independence, Pleasant and cooperative, Supportive family, Willingly participates in therapeutic activities  Barriers: Decreased endurance, Generalized weakness, Limited mobility, Pain    Plan    AM shower tomorrow morning.  Standing tolerance, functional mobility/transfers with ADL pursuits, modified ADLs, light IADLs (laundry/simple meal prep), and strengthening to maximize safety and independence with ADL/IADL pursuits.    Passport items to be completed:  Passport items to be completed:  Perform bathroom transfers, complete dressing, complete feeding, get ready for the day, prepare a simple meal, participate in household tasks, adapt home for safety needs, demonstrate home exercise program, complete caregiver training     Occupational Therapy Goals (Active)     Problem: Dressing     Dates: Start: 08/07/21       Goal: STG-Within one week, patient will dress LB     Dates: Start: 08/07/21       Description: 1) Individualized Goal:  at a CGA with AE PRN.             Problem: Functional Transfers     Dates: Start: 08/07/21       Goal: STG-Within one week, patient will transfer to tub/shower     Dates: Start: 08/07/21       Description: 1)  Individualized Goal:  with tub shower bench at a CGA level with AE/AD PRN.           Problem: OT Long Term Goals     Dates: Start: 08/07/21       Goal: LTG-By discharge, patient will complete basic self care tasks     Dates: Start: 08/07/21       Description: 1) Individualized Goal:  at a Min A to Mod I level with use of AE/AD PRN.            Goal: LTG-By discharge, patient will perform bathroom transfers     Dates: Start: 08/07/21       Description: 1) Individualized Goal:  at a Min A to Mod I level with AD/ DME PRN.

## 2021-08-13 NOTE — DISCHARGE PLANNING
Per Bogdan at [facility], they are unable to give a response to referral until Monday.  CM notified.

## 2021-08-13 NOTE — PROGRESS NOTES
"Rehab Progress Note     Encounter Date: 8/13/2021    CC: decreased mobility, decreased endurance    Interval Events (Subjective)  Patient sitting up in room. Discussed about discharge planning. Discussed about discharge medications. Discussed about opiates and follow-up. He has no pain contract, he was supposed to have one with his prescribing doctor but was in the hospital. He does have follow-up. Discussed about 2 week prescription.     IDT Team Meeting 8/10/2021  DC/Disposition:  8/17/21 -> 8/15/21     Objective:  VITAL SIGNS: /97   Pulse 77   Temp 36.7 °C (98 °F) (Oral)   Resp 16   Ht 1.707 m (5' 7.2\")   Wt 84.5 kg (186 lb 4.6 oz)   SpO2 93%   BMI 29.00 kg/m²   Gen: NAD  Psych: Mood and affect appropriate  CV: RRR, no edema  Resp: CTAB, no upper airway sounds  Abd: NTND  Neuro: AOx4, following commands    No results found for this or any previous visit (from the past 72 hour(s)).    Current Facility-Administered Medications   Medication Frequency   • lisinopril (PRINIVIL) tablet 40 mg Q DAY   • nitroglycerin (NITROSTAT) tablet 0.4 mg Q5 MIN PRN   • vitamin D (cholecalciferol) tablet 1,000 Units DAILY   • metoprolol tartrate (LOPRESSOR) tablet 75 mg BID   • melatonin tablet 3 mg QHS PRN   • Respiratory Therapy Consult Continuous RT   • Pharmacy Consult Request ...Pain Management Review 1 Each PHARMACY TO DOSE   • oxyCODONE immediate-release (ROXICODONE) tablet 5 mg Q3HRS PRN    Or   • oxyCODONE immediate release (ROXICODONE) tablet 10 mg Q3HRS PRN   • hydrALAZINE (APRESOLINE) tablet 25 mg Q8HRS PRN   • acetaminophen (Tylenol) tablet 650 mg Q4HRS PRN   • senna-docusate (PERICOLACE or SENOKOT S) 8.6-50 MG per tablet 2 tablet BID    And   • polyethylene glycol/lytes (MIRALAX) PACKET 1 Packet QDAY PRN    And   • magnesium hydroxide (MILK OF MAGNESIA) suspension 30 mL QDAY PRN    And   • bisacodyl (DULCOLAX) suppository 10 mg QDAY PRN   • artificial tears ophthalmic solution 1 Drop PRN   • " benzocaine-menthol (CEPACOL) lozenge 1 Lozenge Q2HRS PRN   • mag hydrox-al hydrox-simeth (MAALOX PLUS ES or MYLANTA DS) suspension 20 mL Q2HRS PRN   • ondansetron (ZOFRAN ODT) dispertab 4 mg 4X/DAY PRN    Or   • ondansetron (ZOFRAN) syringe/vial injection 4 mg 4X/DAY PRN   • traZODone (DESYREL) tablet 50 mg QHS PRN   • sodium chloride (OCEAN) 0.65 % nasal spray 2 Spray PRN   • acetaminophen (Tylenol) tablet 650 mg TID   • albuterol inhaler 2 Puff Q6HRS PRN   • atorvastatin (LIPITOR) tablet 40 mg Nightly   • butalbital/apap/caffeine -40 mg (Fioricet) per tablet 1 tablet Q4HRS PRN   • clopidogrel (PLAVIX) tablet 75 mg DAILY   • enoxaparin (LOVENOX) inj 40 mg DAILY   • famotidine (PEPCID) tablet 40 mg Q EVENING   • isosorbide mononitrate SR (IMDUR) tablet 60 mg BID   • levothyroxine (SYNTHROID) tablet 25 mcg Once per day on Mon Tue Wed Thu Fri   • levothyroxine (SYNTHROID) tablet 50 mcg Once per day on Sun Sat   • omeprazole (PRILOSEC) capsule 20 mg QAM   • sertraline (Zoloft) tablet 100 mg Q EVENING   • sertraline (Zoloft) tablet 50 mg QAM   • tamsulosin (FLOMAX) capsule 0.4 mg Q EVENING   • morphine (MS IR) tablet 15 mg Q6HRS       Orders Placed This Encounter   Procedures   • Diet Order Diet: Regular     Standing Status:   Standing     Number of Occurrences:   1     Order Specific Question:   Diet:     Answer:   Regular [1]       Assessment:  Active Hospital Problems    Diagnosis    • *Closed left hip fracture (HCC)    • CAD (coronary artery disease) of artery bypass graft    • COPD (chronic obstructive pulmonary disease) (HCC)    • Essential hypertension    • GERD (gastroesophageal reflux disease)    • Hypothyroidism    • Leukocytosis        Medical Decision Making and Plan:  Left hip fracture - GLF on 8/4/21 due to dog found to have left hip fracture. Patient is now s/p intramedullary device with Dr. Ríos on 8/4/21. WBAT. Has some numbness to left leg, will monitor.   -PT and OT for mobility and  ADLs  -Follow-up Orthopedic surgery.      Post-op/Back pain - Patient reports having pain contract for Morphine 15 mg q6h. Will continue  - PRN Tylenol. Continue Morphine QID, will have appointment with pain prescriber. Provided 2 week Rx  I discussed the risks and benefits of using opiate medications for pain control.  I discussed the risk of addiction, potential for overdose, and respiratory depression (and the potential need for opiate antagonist therapy if this occurs).  I encouraged the patient to take this medication sparingly with the expressed goal of weaning off the medication as soon as is clinically appropriate.  I informed the patient that we are only able to provide a 14 day supply of these medications at discharge and that they will be responsible for requesting any refills needed from their primary care provider or their surgeon.  We discussed the need to safely secure these medications to prevent theft, inadvertent ingestion, or misuse.  Any unused medication should be immediately disposed of through a sanctioned medication disposal program.  We discussed adjunctive pain medications and conservative therapies at length. I answered the patient's questions regarding this treatment, and the patient indicated understanding and willingness to proceed.     Eye drainage-  Bilateral eyes with drainage. No visual changes. Start Erythromycin QID for 3 days    CAD/HTN - Patient on Clonidine 0.1 mg daily, Imdur 60 mg BID, Metoprolol 50 mg BID, Lisinopril 20 mg.  Patient on Plavix 75 mg daily. Will monitor   -SBP into 150s, increase Metoprolol. Increase Lisinopril to 40 mg. Follow-up PCP    Chest pain - Patient with diagnosis of angina and has nitro tabs.  Check EKG and consult hospitalist     HLD - Patient on Atorvastatin 40 mg QHS     COPD - Not on medications. Will monitor      Anemia - Check AM CBC - 10.3, stable     Leukocytosis - May be reactionary, check AM CBC - 11.8 on 8/7 then normal on 8/8. Will  monitor.      Hypothyroidism - Patient on Levothyroxine 25 mcg M-F and 50 mcg Sun-Sat     Depression/Sleep - Patient on Zoloft 50/100 mg      BPH - Patient on Flomax on transfer.      DVT Ppx - Patient has been cleared for Lovenox. Ambulating > 150 feet, discontinue    Total time:  26 minutes.  I spent greater than 50% of the time for patient care, counseling, and coordination on this date, including unit/floor time, and face-to-face time with the patient as per interval events and assessment and plan above. Topics discussed included discharge planning, discharge medications, and opiate counseling.     Romi Singh M.D.

## 2021-08-13 NOTE — CARE PLAN
Problem: Incision Care  Goal: Optimal post surgical incision care  Outcome: Progressing  Incision without redness, swelling or drainage and skin edges are approximated       Problem: Post Surgical Mobility  Goal: Patient's capacity to carry out activities post surgery will improve  Outcome: Progressing  Ambulating with PT using FWW without difficulty noted     Problem: Pain - Standard  Goal: Alleviation of pain or a reduction in pain to the patient’s comfort goal  Outcome: Progressing  Long acting pain med. Patient states he has very good control of pain and he's note needing prn meds     Problem: Fall Risk - Rehab  Goal: Patient will remain free from falls  Outcome: Progressing  No unsafe behaviors noted. He calls for assist 100% of the time

## 2021-08-13 NOTE — THERAPY
Occupational Therapy  Daily Treatment     Patient Name: Antony Akbar  Age:  66 y.o., Sex:  male  Medical Record #: [id number]  Today's Date: 8/13/2021     Precautions  Precautions: Fall Risk, Weight Bearing As Tolerated Left Lower Extremity  Comments: Pacemaker         Subjective       Objective       08/13/21 1401   Precautions   Precautions Fall Risk;Weight Bearing As Tolerated Left Lower Extremity   Comments Pacemaker   Vitals   O2 Delivery Device None - Room Air   Pain   Intervention Declines   Pain 0 - 10 Group   Therapist Pain Assessment 0   Non Verbal Descriptors   Non Verbal Scale  Calm   Cognition    Level of Consciousness Alert   Sleep/Wake Cycle   Sleep & Rest Awake   Functional Level of Assist   Lower Body Dressing Supervision   Lower Body Dressing Description Dressing stick;Reacher;Sock aid   Toileting Modified Independent   Toileting Description Grab bar;Increased time   Toilet Transfers Supervised   Toilet Transfer Description Grab bar;Supervision for safety   Interdisciplinary Plan of Care Collaboration   IDT Collaboration with  Certified Nursing Assistant   Patient Position at End of Therapy Seated;Self Releasing Lap Belt Applied;Chair Alarm On;Call Light within Reach;Tray Table within Reach;Phone within Reach   Collaboration Comments CLOF   OT Total Time Spent   OT Individual Total Time Spent (Mins) 30   OT Charge Group   OT Self Care / ADL 1   OT Therapy Activity 1     SBA for standing puzzle activity - 15 mins to complete 24 piece jumbo puzzle - additional time for prblem solving, SBA, no LOB, seated rest break at 10 min major, return to stand for 5 mins to complete puzzle.  Assessment    Pt was alert and cooperative w/ tx.  Tx emphasis on ADL's and therapeutic activity - standing balance/endurance - see notes above for details.  Strengths: Able to follow instructions, Alert and oriented, Effective communication skills, Good insight into deficits/needs, Manages pain appropriately, Motivated for self  care and independence, Pleasant and cooperative, Supportive family, Willingly participates in therapeutic activities  Barriers: Decreased endurance, Generalized weakness, Limited mobility, Pain    Plan    Sunday  Discharge.   Standing tolerance, functional mobility/transfers with ADL pursuits, modified ADLs, light IADLs (laundry/simple meal prep), and strengthening to maximize safety and independence with ADL/IADL pursuits.    Passport items to be completed:  Passport items to be completed:  Perform bathroom transfers, complete dressing, complete feeding, get ready for the day, prepare a simple meal, participate in household tasks, adapt home for safety needs, demonstrate home exercise program, complete caregiver training     Occupational Therapy Goals (Active)     Problem: Dressing     Dates: Start: 08/07/21       Goal: STG-Within one week, patient will dress LB     Dates: Start: 08/07/21       Description: 1) Individualized Goal:  at a CGA with AE PRN.             Problem: Functional Transfers     Dates: Start: 08/07/21       Goal: STG-Within one week, patient will transfer to tub/shower     Dates: Start: 08/07/21       Description: 1) Individualized Goal:  with tub shower bench at a CGA level with AE/AD PRN.           Problem: OT Long Term Goals     Dates: Start: 08/07/21       Goal: LTG-By discharge, patient will complete basic self care tasks     Dates: Start: 08/07/21       Description: 1) Individualized Goal:  at a Min A to Mod I level with use of AE/AD PRN.            Goal: LTG-By discharge, patient will perform bathroom transfers     Dates: Start: 08/07/21       Description: 1) Individualized Goal:  at a Min A to Mod I level with AD/ DME PRN.

## 2021-08-14 PROCEDURE — 94760 N-INVAS EAR/PLS OXIMETRY 1: CPT

## 2021-08-14 PROCEDURE — 99232 SBSQ HOSP IP/OBS MODERATE 35: CPT | Performed by: HOSPITALIST

## 2021-08-14 PROCEDURE — 97530 THERAPEUTIC ACTIVITIES: CPT

## 2021-08-14 PROCEDURE — 700111 HCHG RX REV CODE 636 W/ 250 OVERRIDE (IP): Performed by: PHYSICAL MEDICINE & REHABILITATION

## 2021-08-14 PROCEDURE — A9270 NON-COVERED ITEM OR SERVICE: HCPCS | Performed by: HOSPITALIST

## 2021-08-14 PROCEDURE — 97116 GAIT TRAINING THERAPY: CPT

## 2021-08-14 PROCEDURE — A9270 NON-COVERED ITEM OR SERVICE: HCPCS | Performed by: PHYSICAL MEDICINE & REHABILITATION

## 2021-08-14 PROCEDURE — 770010 HCHG ROOM/CARE - REHAB SEMI PRIVAT*

## 2021-08-14 PROCEDURE — 700102 HCHG RX REV CODE 250 W/ 637 OVERRIDE(OP): Performed by: PHYSICAL MEDICINE & REHABILITATION

## 2021-08-14 PROCEDURE — 99231 SBSQ HOSP IP/OBS SF/LOW 25: CPT | Performed by: PHYSICAL MEDICINE & REHABILITATION

## 2021-08-14 PROCEDURE — 97110 THERAPEUTIC EXERCISES: CPT

## 2021-08-14 PROCEDURE — 700102 HCHG RX REV CODE 250 W/ 637 OVERRIDE(OP): Performed by: HOSPITALIST

## 2021-08-14 RX ADMIN — ISOSORBIDE MONONITRATE 60 MG: 30 TABLET, EXTENDED RELEASE ORAL at 08:45

## 2021-08-14 RX ADMIN — LEVOTHYROXINE SODIUM 50 MCG: 50 TABLET ORAL at 05:43

## 2021-08-14 RX ADMIN — CLOPIDOGREL BISULFATE 75 MG: 75 TABLET ORAL at 08:45

## 2021-08-14 RX ADMIN — TAMSULOSIN HYDROCHLORIDE 0.4 MG: 0.4 CAPSULE ORAL at 21:07

## 2021-08-14 RX ADMIN — MORPHINE SULFATE 15 MG: 15 TABLET ORAL at 23:30

## 2021-08-14 RX ADMIN — MORPHINE SULFATE 15 MG: 15 TABLET ORAL at 05:48

## 2021-08-14 RX ADMIN — SERTRALINE HYDROCHLORIDE 100 MG: 50 TABLET ORAL at 21:06

## 2021-08-14 RX ADMIN — OMEPRAZOLE 20 MG: 20 CAPSULE, DELAYED RELEASE ORAL at 08:45

## 2021-08-14 RX ADMIN — ENOXAPARIN SODIUM 40 MG: 40 INJECTION SUBCUTANEOUS at 08:44

## 2021-08-14 RX ADMIN — ATORVASTATIN CALCIUM 40 MG: 40 TABLET, FILM COATED ORAL at 21:07

## 2021-08-14 RX ADMIN — ISOSORBIDE MONONITRATE 60 MG: 30 TABLET, EXTENDED RELEASE ORAL at 21:06

## 2021-08-14 RX ADMIN — LISINOPRIL 40 MG: 20 TABLET ORAL at 05:41

## 2021-08-14 RX ADMIN — METOPROLOL TARTRATE 75 MG: 25 TABLET, FILM COATED ORAL at 17:34

## 2021-08-14 RX ADMIN — SENNOSIDES AND DOCUSATE SODIUM 2 TABLET: 8.6; 5 TABLET ORAL at 21:05

## 2021-08-14 RX ADMIN — ACETAMINOPHEN 650 MG: 325 TABLET ORAL at 15:37

## 2021-08-14 RX ADMIN — FAMOTIDINE 40 MG: 20 TABLET ORAL at 21:07

## 2021-08-14 RX ADMIN — ACETAMINOPHEN 650 MG: 325 TABLET ORAL at 21:05

## 2021-08-14 RX ADMIN — MORPHINE SULFATE 15 MG: 15 TABLET ORAL at 17:34

## 2021-08-14 RX ADMIN — Medication 1000 UNITS: at 08:46

## 2021-08-14 RX ADMIN — ACETAMINOPHEN 650 MG: 325 TABLET ORAL at 08:45

## 2021-08-14 RX ADMIN — MORPHINE SULFATE 15 MG: 15 TABLET ORAL at 13:08

## 2021-08-14 RX ADMIN — SERTRALINE HYDROCHLORIDE 50 MG: 50 TABLET ORAL at 08:45

## 2021-08-14 RX ADMIN — METOPROLOL TARTRATE 75 MG: 25 TABLET, FILM COATED ORAL at 05:40

## 2021-08-14 ASSESSMENT — ENCOUNTER SYMPTOMS
PALPITATIONS: 0
VOMITING: 0
HEADACHES: 0
SHORTNESS OF BREATH: 0
FEVER: 0
BLURRED VISION: 0
NAUSEA: 0
DIZZINESS: 0
HALLUCINATIONS: 0

## 2021-08-14 ASSESSMENT — ACTIVITIES OF DAILY LIVING (ADL)
TOILET_TRANSFER_LEVEL_OF_ASSIST: REQUIRES SUPERVISION WITH TOILET TRANSFER
TOILETING_LEVEL_OF_ASSIST: ABLE TO COMPLETE TOILETING WITHOUT ASSIST
SHOWER_TRANSFER_LEVEL_OF_ASSIST: REQUIRES SUPERVISION WITH SHOWER TRANSFER
TOILET_TRANSFER_DESCRIPTION: ADAPTIVE EQUIPMENT;SUPERVISION FOR SAFETY
TUB_SHOWER_TRANSFER_DESCRIPTION: ADAPTIVE EQUIPMENT;SUPERVISION FOR SAFETY

## 2021-08-14 ASSESSMENT — GAIT ASSESSMENTS
DISTANCE (FEET): 300
ASSISTIVE DEVICE: FRONT WHEEL WALKER
GAIT LEVEL OF ASSIST: SUPERVISED

## 2021-08-14 NOTE — THERAPY
Occupational Therapy  Daily Treatment     Patient Name: Antony Akbar  Age:  66 y.o., Sex:  male  Medical Record #: [id number]  Today's Date: 8/14/2021     Precautions  Precautions: (P) Fall Risk, Weight Bearing As Tolerated Left Lower Extremity  Comments: (P) Pacemaker    Subjective    Pt agreeable to therapy      Objective     08/14/21 1231   Precautions   Precautions Fall Risk;Weight Bearing As Tolerated Left Lower Extremity   Comments Pacemaker   Sitting Upper Body Exercises   Chest Press   (2x10 3# dumbbell; 2x10 pink theraband )   Front Arm Raise Light Resistance Theraband  (2x10 pink theraband )   External Shoulder Rotation 2 sets of 10  (3# dumbbell )   Bicep Curls   (2x10 3# dumbbell, 2x10 pink theraband )   Elbow Extension   (2x10 3# dumbbell, 2x10 pink theraband )   Upper Extremity Bike   (6.5 min level 4 resistance, 3.5 min level 3 resistance )   Interdisciplinary Plan of Care Collaboration   IDT Collaboration with  Nursing   Patient Position at End of Therapy Seated;Chair Alarm On;Call Light within Reach;Tray Table within Reach;Phone within Reach   Collaboration Comments pt request pain meds; RN in to administer    Strengths & Barriers   Strengths Able to follow instructions;Alert and oriented;Effective communication skills;Good insight into deficits/needs;Manages pain appropriately;Motivated for self care and independence;Pleasant and cooperative;Supportive family;Willingly participates in therapeutic activities   Barriers Decreased endurance;Generalized weakness;Limited mobility;Pain   OT Total Time Spent   OT Individual Total Time Spent (Mins) 60   OT Charge Group   OT Therapeutic Exercise  4     Reviewed OT section of Passport with pt and signed off items completed     Assessment    Pt tolerated OT session fair with focus on UE strength and endurance to increase independence in ADLs and functional transfers. Pt required 2 rest breaks during Fluido activity. Required modifications of UE strength program  due to neck and back pain. Pt only able to complete elbow flexion/extension, chest press, and front arm raise with pink theraband. Began exercises with 3# dumbbells then transitioned to pink theraband which pt tolerated better; handout provided and reviewed with pt for HEP. Instructed pt in first set then pt completed second set with teach back. Pt is d/c'ing tomorrow and reports no overt concerns with going home. Will have assistance from partner for supervision for ADLs and assist for IADLs.     Strengths: (P) Able to follow instructions, Alert and oriented, Effective communication skills, Good insight into deficits/needs, Manages pain appropriately, Motivated for self care and independence, Pleasant and cooperative, Supportive family, Willingly participates in therapeutic activities  Barriers: (P) Decreased endurance, Generalized weakness, Limited mobility, Pain    Plan    D/c home tomorrow with support of significant other       Occupational Therapy Goals (Active)     Problem: Dressing     Dates: Start: 08/07/21       Goal: STG-Within one week, patient will dress LB     Dates: Start: 08/07/21       Description: 1) Individualized Goal:  at a CGA with AE PRN.             Problem: Functional Transfers     Dates: Start: 08/07/21       Goal: STG-Within one week, patient will transfer to tub/shower     Dates: Start: 08/07/21       Description: 1) Individualized Goal:  with tub shower bench at a CGA level with AE/AD PRN.           Problem: OT Long Term Goals     Dates: Start: 08/07/21       Goal: LTG-By discharge, patient will complete basic self care tasks     Dates: Start: 08/07/21       Description: 1) Individualized Goal:  at a Min A to Mod I level with use of AE/AD PRN.            Goal: LTG-By discharge, patient will perform bathroom transfers     Dates: Start: 08/07/21       Description: 1) Individualized Goal:  at a Min A to Mod I level with AD/ DME PRN.

## 2021-08-14 NOTE — FLOWSHEET NOTE
08/13/21 1806   Events/Summary/Plan   Events/Summary/Plan SpO2 check   Vital Signs   Pulse 70   Respiration 16   Pulse Oximetry 96 %   $ Pulse Oximetry (Spot Check) Yes   Oxygen   O2 Delivery Device Room air w/o2 available

## 2021-08-14 NOTE — THERAPY
Occupational Therapy  Daily Treatment     Patient Name: Antony Akbar  Age:  66 y.o., Sex:  male  Medical Record #: [id number]  Today's Date: 8/14/2021     Precautions  Precautions: (P) Fall Risk, Weight Bearing As Tolerated Left Lower Extremity  Comments: (P) Pacemaker         Subjective    Pt agreeable to OT session      Objective     08/14/21 0831   Precautions   Precautions Fall Risk;Weight Bearing As Tolerated Left Lower Extremity   Comments Pacemaker   Sitting Lower Body Exercises   Nustep Resistance Level 3  (10 min, .30 mi. )   Interdisciplinary Plan of Care Collaboration   IDT Collaboration with  Nursing   Patient Position at End of Therapy Seated;Chair Alarm On;Call Light within Reach;Tray Table within Reach;Phone within Reach   Collaboration Comments RN administer meds during session    Strengths & Barriers   Strengths Able to follow instructions;Alert and oriented;Effective communication skills;Good insight into deficits/needs;Manages pain appropriately;Motivated for self care and independence;Pleasant and cooperative;Supportive family;Willingly participates in therapeutic activities   Barriers Decreased endurance;Generalized weakness;Limited mobility;Pain   OT Total Time Spent   OT Individual Total Time Spent (Mins) 30   OT Charge Group   OT Therapy Activity 1   OT Therapeutic Exercise  1     Pt completed 120' functional mobility walking 10' down and back for each ladder ball toss.     Assessment    Pt tolerated OT session well with focus on endurance and functional mobility. Completed ladder ball toss activity with no rest break.   Strengths: (P) Able to follow instructions, Alert and oriented, Effective communication skills, Good insight into deficits/needs, Manages pain appropriately, Motivated for self care and independence, Pleasant and cooperative, Supportive family, Willingly participates in therapeutic activities  Barriers: (P) Decreased endurance, Generalized weakness, Limited mobility,  Pain    Plan    Sunday  Discharge.   Standing tolerance, functional mobility/transfers with ADL pursuits, modified ADLs, light IADLs (laundry/simple meal prep), and strengthening to maximize safety and independence with ADL/IADL pursuits.    Passport items to be completed:  Perform bathroom transfers, complete dressing, complete feeding, get ready for the day, prepare a simple meal, participate in household tasks, adapt home for safety needs, demonstrate home exercise program, complete caregiver training     Occupational Therapy Goals (Active)     Problem: Dressing     Dates: Start: 08/07/21       Goal: STG-Within one week, patient will dress LB     Dates: Start: 08/07/21       Description: 1) Individualized Goal:  at a CGA with AE PRN.             Problem: Functional Transfers     Dates: Start: 08/07/21       Goal: STG-Within one week, patient will transfer to tub/shower     Dates: Start: 08/07/21       Description: 1) Individualized Goal:  with tub shower bench at a CGA level with AE/AD PRN.           Problem: OT Long Term Goals     Dates: Start: 08/07/21       Goal: LTG-By discharge, patient will complete basic self care tasks     Dates: Start: 08/07/21       Description: 1) Individualized Goal:  at a Min A to Mod I level with use of AE/AD PRN.            Goal: LTG-By discharge, patient will perform bathroom transfers     Dates: Start: 08/07/21       Description: 1) Individualized Goal:  at a Min A to Mod I level with AD/ DME PRN.

## 2021-08-14 NOTE — THERAPY
Physical Therapy   Daily Treatment     Patient Name: Antony Akbar  Age:  66 y.o., Sex:  male  Medical Record #: [id number]  Today's Date: 8/14/2021     Precautions  Precautions: Fall Risk, Weight Bearing As Tolerated Left Lower Extremity  Comments: pacemaker     Subjective    Pt agreeable to therapy and discharge assessment. Pt reports that he will not do any stairs. He uses a ramp and would like to work on that. He also thinks working on walking will help him the most.      Objective       08/14/21 1431   Precautions   Precautions Fall Risk;Weight Bearing As Tolerated Left Lower Extremity   Comments pacemaker    Gait Functional Level of Assist    Gait Level Of Assist Supervised   Assistive Device Front Wheel Walker   Distance (Feet) 300   # of Times Distance was Traveled 3   PT Total Time Spent   PT Individual Total Time Spent (Mins) 60   PT Charge Group   PT Gait Training 1   PT Therapeutic Activities 3     Gait training: FWW over level surface verbal cues for working on increasing weight bearing on left LE and working toward continuous progression of walker.     Ramp training: up and down ramp x 3 SBA with FWW    Assessment    Patient demonstrates good control of the FWW on ramp appropriate for SUPV. He improved his ambulation tolerance today and worked on normalizing gait pattern and weight bearing on LLE.     Strengths: Able to follow instructions, Effective communication skills, Good insight into deficits/needs, Pleasant and cooperative, Supportive family, Willingly participates in therapeutic activities  Barriers: Decreased endurance, Generalized weakness, Impaired activity tolerance, Impaired balance    Plan    Discharge.    Passport items to be completed:n/a      Physical Therapy Problems (Active)     Problem: Mobility     Dates: Start: 08/10/21       Goal: STG-Within one week, patient will ambulate community distances with FWW or 4WW and Mod I to safely navigate his home environment     Dates: Start: 08/10/21           Goal: STG-Within one week, patient will ascend/descend an ADA ramp with 4WW or FWW and SPV to safely enter/exit his home     Dates: Start: 08/10/21             Problem: Mobility Transfers     Dates: Start: 08/07/21       Goal: STG-Within one week, patient will transfer bed to chair     Dates: Start: 08/07/21       Goal Note filed on 08/10/21 1116 by Shyam Hollins, PT     Patient currently uses CGA-SBA with FWW for transfers instead of SPV.            Goal: STG-Within one week, patient will transfer in/out of car SPV from ambulatory level with FWW or 4WW     Dates: Start: 08/10/21             Problem: PT-Long Term Goals     Dates: Start: 08/07/21       Goal: LTG-By discharge, patient will ambulate     Dates: Start: 08/07/21       Description: Goal progressing: ambulating with FWW household distances > 150 ft with SUPV     Goal Note filed on 08/07/21 1608 by Teri Vega DPT     1) Individualized goal:   >55' with FWW or 4WW and Mod I to safely navigate his home environment  2) Interventions: PT Group Therapy, PT Gait Training, PT Self Care/Home Eval, PT Therapeutic Exercises, PT Neuro Re-Ed/Balance, PT Therapeutic Activity, and PT Evaluation            Goal: LTG-By discharge, patient will transfer one surface to another     Dates: Start: 08/07/21       Description: Goal met stand pivot transfer with FWW mod I     Goal Note filed on 08/07/21 1608 by Teri Vega DPT     1) Individualized goal:   Mod I with FWW or 4WW  2) Interventions:  PT Group Therapy, PT Gait Training, PT Self Care/Home Eval, PT Therapeutic Exercises, PT Neuro Re-Ed/Balance, PT Therapeutic Activity, and PT Evaluation            Goal: LTG-By discharge, patient will transfer in/out of a car     Dates: Start: 08/07/21       Description: Goal met: stand pivot transfer with FWW into and out of car SUPV     Goal Note filed on 08/07/21 1608 by Teri Vega DPT     1) Individualized goal:   SPV from ambulatory level with FWW  or 4WW  2) Interventions:  PT Group Therapy, PT Gait Training, PT Self Care/Home Eval, PT Therapeutic Exercises, PT Neuro Re-Ed/Balance, PT Therapeutic Activity, and PT Evaluation            Goal: LTG-By discharge, patient will     Dates: Start: 08/07/21       Description: Goal met     Goal Note filed on 08/07/21 6954 by Teri Vega DPT     1) Individualized goal:   ascend/descend an ADA ramp with 4WW or FWW and SPV to safely enter/exit his home  2) Interventions:  PT Group Therapy, PT Gait Training, PT Self Care/Home Eval, PT Therapeutic Exercises, PT Neuro Re-Ed/Balance, PT Therapeutic Activity, and PT Evaluation

## 2021-08-14 NOTE — CARE PLAN
Problem: Dressing  Goal: STG-Within one week, patient will dress LB  Description: 1) Individualized Goal:  at a CGA with AE PRN.     Outcome: Met  Note: Goal: CGA with use of AE PRN   Met @ supervision level      Problem: Functional Transfers  Goal: STG-Within one week, patient will transfer to tub/shower  Description: 1) Individualized Goal:  with tub shower bench at a CGA level with AE/AD PRN.   8/14/2021 1350 by Ebonie Dubose, OT  Outcome: Met  Note: Goal: CGA with AE PRN   Met @ supervision level with tub transfer bench     Problem: OT Long Term Goals  Goal: LTG-By discharge, patient will complete basic self care tasks  Description: 1) Individualized Goal:  at a Min A to Mod I level with use of AE/AD PRN.       Outcome: Met  Note: Goal: Donald to modified independent   Met @ CGA for bathing, supervision for LBD, independent to modified independent for all other ADLs  Goal: LTG-By discharge, patient will perform bathroom transfers  Description: 1) Individualized Goal:  at a Min A to Mod I level with AD/ DME PRN.    8/14/2021 1350 by Ebonie Dubose, OT  Outcome: Met  Note: Goal: Donald to modified independent   Met @ supervision level with commode placed over toilet (pt owns commode) and tub transfer bench (pt is ordering tub transfer bench for home)

## 2021-08-14 NOTE — THERAPY
Occupational Therapy  Daily Treatment     Patient Name: Antony Akbar  Age:  66 y.o., Sex:  male  Medical Record #: [id number]  Today's Date: 8/14/2021     Precautions  Precautions: (P) Fall Risk, Weight Bearing As Tolerated Left Lower Extremity  Comments: (P) Pacemaker         Subjective    Pt agreeable to OT session      Objective     08/14/21 1031   Precautions   Precautions Fall Risk;Weight Bearing As Tolerated Left Lower Extremity   Comments Pacemaker   Functional Level of Assist   Toilet Transfers Supervised  (dry toilet txfer with commode over toilet )   Toilet Transfer Description Adaptive equipment;Supervision for safety   Tub / Shower Transfers Supervised  (dry tub/shower txfer w/ tub txfer bench )   Tub Shower Transfer Description Adaptive equipment;Supervision for safety   Interdisciplinary Plan of Care Collaboration   Patient Position at End of Therapy Seated;Chair Alarm On;Call Light within Reach;Tray Table within Reach;Phone within Reach   Strengths & Barriers   Strengths Able to follow instructions;Alert and oriented;Effective communication skills;Good insight into deficits/needs;Manages pain appropriately;Motivated for self care and independence;Pleasant and cooperative;Supportive family;Willingly participates in therapeutic activities   Barriers Decreased endurance;Generalized weakness;Limited mobility;Pain   OT Total Time Spent   OT Individual Total Time Spent (Mins) 30   OT Charge Group   OT Therapy Activity 2     Pt completed pipe tree activity to improve standing tolerance. Pt required 1st rest break after 2 min then 1 additional rest break after.     Community distance functional mobility - 2 laps FWW level in therapy gym.     Assessment    Pt tolerated OT session fair with focus on bathroom transfers, standing tolerance, and functional mobility. Supervision for bathroom transfers with AE - see above. Standing tolerance is limited by back pain. SBA for functional mobility - did not require rest  break during 2 laps.   Strengths: (P) Able to follow instructions, Alert and oriented, Effective communication skills, Good insight into deficits/needs, Manages pain appropriately, Motivated for self care and independence, Pleasant and cooperative, Supportive family, Willingly participates in therapeutic activities  Barriers: (P) Decreased endurance, Generalized weakness, Limited mobility, Pain    Plan    Sunday  Discharge.   Standing tolerance, functional mobility/transfers with ADL pursuits, modified ADLs, light IADLs (laundry/simple meal prep), and strengthening to maximize safety and independence with ADL/IADL pursuits.    Passport items to be completed:  Perform bathroom transfers, complete dressing, complete feeding, get ready for the day, prepare a simple meal, participate in household tasks, adapt home for safety needs, demonstrate home exercise program, complete caregiver training     Occupational Therapy Goals (Active)     Problem: Dressing     Dates: Start: 08/07/21       Goal: STG-Within one week, patient will dress LB     Dates: Start: 08/07/21       Description: 1) Individualized Goal:  at a CGA with AE PRN.             Problem: Functional Transfers     Dates: Start: 08/07/21       Goal: STG-Within one week, patient will transfer to tub/shower     Dates: Start: 08/07/21       Description: 1) Individualized Goal:  with tub shower bench at a CGA level with AE/AD PRN.           Problem: OT Long Term Goals     Dates: Start: 08/07/21       Goal: LTG-By discharge, patient will complete basic self care tasks     Dates: Start: 08/07/21       Description: 1) Individualized Goal:  at a Min A to Mod I level with use of AE/AD PRN.            Goal: LTG-By discharge, patient will perform bathroom transfers     Dates: Start: 08/07/21       Description: 1) Individualized Goal:  at a Min A to Mod I level with AD/ DME PRN.

## 2021-08-14 NOTE — PROGRESS NOTES
[facility] Medicine Daily Progress Note    Date of Service  8/14/2021    Chief Complaint:  Chest pain  Hypertension    Interval History:  No significant events or changes since last visit    Review of Systems  Review of Systems   Constitutional: Negative for fever.   Eyes: Negative for blurred vision.   Respiratory: Negative for shortness of breath.    Cardiovascular: Negative for palpitations.   Gastrointestinal: Negative for nausea and vomiting.   Neurological: Negative for dizziness and headaches.   Psychiatric/Behavioral: Negative for hallucinations.        Physical Exam  Temp:  [36.7 °C (98 °F)-36.7 °C (98.1 °F)] 36.7 °C (98.1 °F)  Pulse:  [68-77] 69  Resp:  [16-17] 17  BP: (131-153)/(77-97) 140/77  SpO2:  [96 %] 96 %    Physical Exam  Vitals and nursing note reviewed.   Constitutional:       General: He is not in acute distress.  HENT:      Mouth/Throat:      Mouth: Mucous membranes are moist.      Pharynx: Oropharynx is clear.   Eyes:      General: No scleral icterus.  Cardiovascular:      Rate and Rhythm: Normal rate and regular rhythm.   Pulmonary:      Effort: Pulmonary effort is normal.      Breath sounds: No wheezing or rales.   Abdominal:      General: Bowel sounds are normal.      Palpations: Abdomen is soft.   Musculoskeletal:      Cervical back: No rigidity.      Right lower leg: No edema.      Left lower leg: No edema.   Skin:     General: Skin is warm and dry.   Neurological:      Mental Status: He is alert and oriented to person, place, and time.   Psychiatric:         Mood and Affect: Mood normal.         Behavior: Behavior normal.         Fluids    Intake/Output Summary (Last 24 hours) at 8/14/2021 0949  Last data filed at 8/14/2021 0430  Gross per 24 hour   Intake 580 ml   Output 800 ml   Net -220 ml       Laboratory                        Imaging      Assessment/Plan  * Closed left hip fracture (HCC)- (present on admission)  Assessment & Plan  S/P surgical repair (8/4)    Vitamin D  insufficiency  Assessment & Plan  Vit D: 28  On supplements    Chest pain  Assessment & Plan  Has hx of angina and occ CP  Developed CP on 8/10: located substernally, no radiation, pressure like, lasted 30 minutes  Had assoc of nausea, chills & sweats, shakes  Relieved with SL Nitro  On Nitro prn  On Imdur  EKG: no changes from prior  Trop 16    CAD (coronary artery disease) of artery bypass graft- (present on admission)  Assessment & Plan  Has Hx of CABG x 2 vessels  Has hx of 6 cardiac stents  On Lopressor  On Lisinopril  On Imdur  On Plavix  On Lipitor    Essential hypertension- (present on admission)  Assessment & Plan  BP generally ok but occ rises up a little  On Lopressor  On Lisinopril -- dose recently increased  Note: on Flomax  Will monitor another day since meds were recently adjusted (8/14)    Hypothyroidism- (present on admission)  Assessment & Plan  TSH wnl (8/7)  On Synthroid

## 2021-08-14 NOTE — CARE PLAN
"  Problem: Skin Integrity  Goal: Skin integrity is maintained or improved  Outcome: Progressing  Note: Left hip and thigh staples intact , well approximated , dressing changed.     Problem: Fall Risk - Rehab  Goal: Patient will remain free from falls  Outcome: Progressing  Note: Corie Rm Fall risk Assessment Score: 15    High fall risk Interventions   - Bed and strip alarm   - Yellow sign by the door   - Yellow wrist band \"Fall risk\"  - Do not leave patient unattended in the bathroom  - Fall risk education provided     Problem: Pain - Standard  Goal: Alleviation of pain or a reduction in pain to the patient’s comfort goal  Outcome: Progressing  Note: Assessed for pain and discomfort, denies pain .   The patient is Stable - Low risk of patient condition declining or worsening    Shift Goals  Clinical Goals: sleep  Patient Goals: sleep    Progress made toward(s) clinical / shift goals:  Pt able to sleep, pain under control.  Patient is not progressing towards the following goals:Cont monitored.      "

## 2021-08-14 NOTE — PROGRESS NOTES
"Rehab Progress Note     CC: left hip fx    Interval Events (Subjective)  Patient is doing well. No complaints. Patient reports pain is controlled, sleeping well, and overall feeling ok. No new issues. Patient is expected to discharge tomorrow. He is ready to go home.       Objective:  VITAL SIGNS: /77   Pulse 69   Temp 36.7 °C (98.1 °F) (Oral)   Resp 17   Ht 1.707 m (5' 7.2\")   Wt 84.5 kg (186 lb 4.6 oz)   SpO2 96%   BMI 29.00 kg/m²   Gen: NAD  Psych: Mood & Affect appropriate   CV: RRR, No cyanosis  Resp: CTAB  Abd: NTND  Skin: No visible rashes or lesions  Neuro: Answers questions appropriately, Following commands     No results found for this or any previous visit (from the past 72 hour(s)).    Current Facility-Administered Medications   Medication Frequency   • lisinopril (PRINIVIL) tablet 40 mg Q DAY   • nitroglycerin (NITROSTAT) tablet 0.4 mg Q5 MIN PRN   • vitamin D (cholecalciferol) tablet 1,000 Units DAILY   • metoprolol tartrate (LOPRESSOR) tablet 75 mg BID   • melatonin tablet 3 mg QHS PRN   • Respiratory Therapy Consult Continuous RT   • Pharmacy Consult Request ...Pain Management Review 1 Each PHARMACY TO DOSE   • oxyCODONE immediate-release (ROXICODONE) tablet 5 mg Q3HRS PRN    Or   • oxyCODONE immediate release (ROXICODONE) tablet 10 mg Q3HRS PRN   • hydrALAZINE (APRESOLINE) tablet 25 mg Q8HRS PRN   • acetaminophen (Tylenol) tablet 650 mg Q4HRS PRN   • senna-docusate (PERICOLACE or SENOKOT S) 8.6-50 MG per tablet 2 tablet BID    And   • polyethylene glycol/lytes (MIRALAX) PACKET 1 Packet QDAY PRN    And   • magnesium hydroxide (MILK OF MAGNESIA) suspension 30 mL QDAY PRN    And   • bisacodyl (DULCOLAX) suppository 10 mg QDAY PRN   • artificial tears ophthalmic solution 1 Drop PRN   • benzocaine-menthol (CEPACOL) lozenge 1 Lozenge Q2HRS PRN   • mag hydrox-al hydrox-simeth (MAALOX PLUS ES or MYLANTA DS) suspension 20 mL Q2HRS PRN   • ondansetron (ZOFRAN ODT) dispertab 4 mg 4X/DAY PRN    Or "   • ondansetron (ZOFRAN) syringe/vial injection 4 mg 4X/DAY PRN   • traZODone (DESYREL) tablet 50 mg QHS PRN   • sodium chloride (OCEAN) 0.65 % nasal spray 2 Spray PRN   • acetaminophen (Tylenol) tablet 650 mg TID   • albuterol inhaler 2 Puff Q6HRS PRN   • atorvastatin (LIPITOR) tablet 40 mg Nightly   • butalbital/apap/caffeine -40 mg (Fioricet) per tablet 1 tablet Q4HRS PRN   • clopidogrel (PLAVIX) tablet 75 mg DAILY   • enoxaparin (LOVENOX) inj 40 mg DAILY   • famotidine (PEPCID) tablet 40 mg Q EVENING   • isosorbide mononitrate SR (IMDUR) tablet 60 mg BID   • levothyroxine (SYNTHROID) tablet 25 mcg Once per day on Mon Tue Wed Thu Fri   • levothyroxine (SYNTHROID) tablet 50 mcg Once per day on Sun Sat   • omeprazole (PRILOSEC) capsule 20 mg QAM   • sertraline (Zoloft) tablet 100 mg Q EVENING   • sertraline (Zoloft) tablet 50 mg QAM   • tamsulosin (FLOMAX) capsule 0.4 mg Q EVENING   • morphine (MS IR) tablet 15 mg Q6HRS       Orders Placed This Encounter   Procedures   • Diet Order Diet: Regular     Standing Status:   Standing     Number of Occurrences:   1     Order Specific Question:   Diet:     Answer:   Regular [1]       Assessment:  Active Hospital Problems    Diagnosis    • *Closed left hip fracture (HCC)    • Chest pain    • Vitamin D insufficiency    • CAD (coronary artery disease) of artery bypass graft    • COPD (chronic obstructive pulmonary disease) (HCC)    • Essential hypertension    • GERD (gastroesophageal reflux disease)    • Hypothyroidism        Medical Decision Making and Plan:  Patient is admitted to [facility] for ongoing PT, OT and/or SLP.  Continue medical management per primary team.      In addition:   No changes     Total time:  16 minutes.  I spent greater than 50% of the time for patient care and coordination on this date, including unit/floor time, and face-to-face time with the patient as per assessment and plan above.      Rosas Shah, DO   Physical Medicine and Rehabilitation

## 2021-08-15 VITALS
OXYGEN SATURATION: 96 % | BODY MASS INDEX: 28.2 KG/M2 | HEIGHT: 67 IN | HEART RATE: 75 BPM | TEMPERATURE: 98.2 F | DIASTOLIC BLOOD PRESSURE: 81 MMHG | RESPIRATION RATE: 18 BRPM | WEIGHT: 179.68 LBS | SYSTOLIC BLOOD PRESSURE: 148 MMHG

## 2021-08-15 PROCEDURE — A9270 NON-COVERED ITEM OR SERVICE: HCPCS | Performed by: HOSPITALIST

## 2021-08-15 PROCEDURE — 700102 HCHG RX REV CODE 250 W/ 637 OVERRIDE(OP): Performed by: PHYSICAL MEDICINE & REHABILITATION

## 2021-08-15 PROCEDURE — 99239 HOSP IP/OBS DSCHRG MGMT >30: CPT | Performed by: PHYSICAL MEDICINE & REHABILITATION

## 2021-08-15 PROCEDURE — 700102 HCHG RX REV CODE 250 W/ 637 OVERRIDE(OP): Performed by: HOSPITALIST

## 2021-08-15 PROCEDURE — A9270 NON-COVERED ITEM OR SERVICE: HCPCS | Performed by: PHYSICAL MEDICINE & REHABILITATION

## 2021-08-15 PROCEDURE — 99231 SBSQ HOSP IP/OBS SF/LOW 25: CPT | Performed by: HOSPITALIST

## 2021-08-15 PROCEDURE — 700111 HCHG RX REV CODE 636 W/ 250 OVERRIDE (IP): Performed by: PHYSICAL MEDICINE & REHABILITATION

## 2021-08-15 RX ADMIN — SERTRALINE HYDROCHLORIDE 50 MG: 50 TABLET ORAL at 09:33

## 2021-08-15 RX ADMIN — LISINOPRIL 40 MG: 20 TABLET ORAL at 05:25

## 2021-08-15 RX ADMIN — ACETAMINOPHEN 650 MG: 325 TABLET ORAL at 09:33

## 2021-08-15 RX ADMIN — METOPROLOL TARTRATE 75 MG: 25 TABLET, FILM COATED ORAL at 05:24

## 2021-08-15 RX ADMIN — ISOSORBIDE MONONITRATE 60 MG: 30 TABLET, EXTENDED RELEASE ORAL at 09:34

## 2021-08-15 RX ADMIN — MORPHINE SULFATE 15 MG: 15 TABLET ORAL at 11:49

## 2021-08-15 RX ADMIN — SENNOSIDES AND DOCUSATE SODIUM 2 TABLET: 8.6; 5 TABLET ORAL at 09:33

## 2021-08-15 RX ADMIN — MORPHINE SULFATE 15 MG: 15 TABLET ORAL at 05:24

## 2021-08-15 RX ADMIN — ENOXAPARIN SODIUM 40 MG: 40 INJECTION SUBCUTANEOUS at 09:35

## 2021-08-15 RX ADMIN — Medication 1000 UNITS: at 09:33

## 2021-08-15 RX ADMIN — OMEPRAZOLE 20 MG: 20 CAPSULE, DELAYED RELEASE ORAL at 09:33

## 2021-08-15 RX ADMIN — CLOPIDOGREL BISULFATE 75 MG: 75 TABLET ORAL at 09:33

## 2021-08-15 RX ADMIN — LEVOTHYROXINE SODIUM 50 MCG: 50 TABLET ORAL at 05:28

## 2021-08-15 ASSESSMENT — ENCOUNTER SYMPTOMS
BLURRED VISION: 0
NERVOUS/ANXIOUS: 0
FEVER: 0
COUGH: 0
DIARRHEA: 0
DIZZINESS: 0

## 2021-08-15 ASSESSMENT — PAIN DESCRIPTION - PAIN TYPE: TYPE: CHRONIC PAIN;ACUTE PAIN

## 2021-08-15 ASSESSMENT — FIBROSIS 4 INDEX: FIB4 SCORE: 1.8

## 2021-08-15 NOTE — DISCHARGE INSTRUCTIONS
[facility] NURSING DISCHARGE INSTRUCTIONS    Blood Pressure: 148/81  Weight: 81.5 kg (179 lb 10.8 oz)  Nursing recommendations for Antony Akbar at time of discharge are as follows:  Client verbalized understanding of all discharge instructions and prescriptions.     Review all your home medications and newly ordered medications with your doctor and/or pharmacist. Follow medication instructions as directed by your doctor and/or pharmacist.    Pain Management: Scheduled morphine  Discharge Pain Medication Instructions:  See medication list  Comfort Goal: Comfort with Movement, Perform Activity, Stay Alert  Notify your primary care provider if pain is unrelieved with these measures, if the pain is new, or increased in intensity.    Discharge Skin Characteristics:  Warm, dry  Discharge Skin Exam:  Scabs to elbows  Wound 08/07/21 Incision Hip Left (Active)   Site Assessment Clean;Dry;Pink 08/15/21 0930   Periwound Assessment Clean;Dry;Intact 08/15/21 0930   Closure Staples 08/15/21 0930   Drainage Amount None 08/15/21 0930   Wound Cleansing Approved Wound Cleanser 08/15/21 0930   Periwound Protectant Not Applicable 08/15/21 0930   Dressing Cleansing/Solutions Not Applicable 08/15/21 0930   Dressing Options Silicone Adhesive Foam 08/15/21 0930   Dressing Changed Observed 08/15/21 0930   Dressing Status Clean;Dry;Intact 08/15/21 0930   Dressing Change/Treatment Frequency As Needed 08/15/21 0930       Wound 08/07/21 Incision Thigh Left (Active)   Site Assessment Clean;Dry;Pink 08/15/21 0930   Periwound Assessment Clean;Dry;Intact 08/15/21 0930   Closure Staples 08/15/21 0930   Drainage Amount None 08/15/21 0930   Wound Cleansing Approved Wound Cleanser 08/15/21 0930   Periwound Protectant Not Applicable 08/15/21 0930   Dressing Cleansing/Solutions Not Applicable 08/15/21 0930   Dressing Options Silicone Adhesive Foam 08/15/21 0930   Dressing Changed Observed 08/15/21 0930   Dressing Status  Clean;Dry;Intact 08/15/21 0930   Dressing Change/Treatment Frequency As Needed 08/15/21 0930     Skin / Wound Care Instructions: Please contact your primary care physician for any change in skin integrity.     If You Have Surgical Incisions / Wounds:  Monitor surgical site(s) for signs of increased swelling, redness or symptoms of drainage from the site or fever as this could indicate signs and symptoms of infection. If these symptoms are noted, notifiy your primary care provider.    Discharge Safety Instructions: Should Have ADULT SUPERVISION     Discharge Safety Concerns: Weakness  The interdisciplinary team has made recommendation that you should not be left alone  in the house due to balance problem, history of falls and weakness  Anti-embolic stockings are not required to increase circulation to the lower extremities.    Discharge Diet: Regular      Discharge Liquids:  Thin  Discharge Bowel Function: Continent  Please contact your primary care physician for any changes in bowel habits.  Discharge Bowel Program:  None  Discharge Bladder Function: Continent  Discharge Urinary Devices:  Urinal      Case Management Discharge Instructions for Antony Akbar   Discharge Location: Home with Outpatient Services  Agency Name/Address/Phone: [facility]  [phone]  Outpatient Services: Physical Therapy      Occupational Therapy Discharge Instructions for Antony Akbar  8/14/2021    Level of Assist Required for Eating: Able to Complete Eating without Assist  Level of Assist Required for Grooming: Able to Complete Grooming without Assist  Level of Assist Required for Dressing: Requires Supervision with Dressing (supervision for lower body dressing )  Equipment for Dressing: Sock Aid, Reacher  Level of Assist Required for Toileting: Able to Complete Toileting without Assist  Level of Assist Required for Toilet Transfer: Requires Supervision with Toilet Transfer  Equipment for Toilet Transfer:  Commode over Toilet  Level of Assist Required for Bathing: Requires Supervision with Bathing  Equipment for Bathing: Tub Transfer Bench, Grab Bars in Tub / Shower, Hand Held Shower Head, Long Handled Sponge  Level of Assist Required for Shower Transfer: Requires Supervision with Shower Transfer  Equipment for Shower Transfer: Grab Bars in Tub / Shower, Tub Transfer Bench  Level of Assist Required for Home Mgmt: Requires Physical Assist with Home Management  Level of Assist Required for Meal Prep: Requires Physical Assist with Meal Preparation  Driving: Please Contact Physician Prior to Driving  Home Exercise Program: Refer to Home Exercise Program Handout for Details  Comments: It was a pleasure to meet youAntony. Take care and good luck with your continued recovery! - Ebonie Dubose, OTR/L       Physical Therapy Discharge Instructions for Antony Akbar  8/13/2021    Weight Bearing Status - Patient Should: Bear Weight as Tolerated Left Leg  Level of Assist Required for Ambulation: Supervision on Flat Surfaces, Should Not Attempt Curbs at This Time, Should Not Attempt Stairs at This Time  Distance Patient May Ambulate: 200 feet as safety allows  Device Recommended for Ambulation: Front-Wheeled Walker  Level of Assist Required for Transfers: Supervision (Assist for Balance with car transfers)  Device Recommended for Transfers: Front-Wheeled Walker  Prosthesis / Orthosis Recommendation / Location: No Prosthesis  or Orthosis Recommended      Intramedullary Nailing of Hip Fracture, Care After  This sheet gives you information about how to care for yourself after your procedure. Your health care provider may also give you more specific instructions. If you have problems or questions, contact your health care provider.  What can I expect after the surgery?  After the procedure, it is common to have these symptoms in your hip area:  · Pain.  · Swelling.  · Tenderness.  · Stiffness and weakness.  Follow these instructions  at home:  Medicines  · Take over-the-counter and prescription medicines only as told by your health care provider.  · Ask your health care provider if the medicine prescribed to you:  ? Requires you to avoid driving or using heavy machinery.  ? Can cause constipation. You may need to take actions to prevent or treat constipation, such as:  § Drink enough fluid to keep your urine pale yellow.  § Take over-the-counter or prescription medicines.  § Eat foods that are high in fiber, such as beans, whole grains, and fresh fruits and vegetables.  § Limit foods that are high in fat and processed sugars, such as fried or sweet foods.  Bathing  · Do not take baths, swim, or use a hot tub until your health care provider approves. Ask your health care provider if you may take showers. You may only be allowed to take sponge baths.  · Keep your bandage (dressing) dry if you shower or take a sponge bath.  Incision care    · Follow instructions from your health care provider about how to take care of your incision. Make sure you:  ? Wash your hands with soap and water before and after you change your dressing. If soap and water are not available, use hand .  ? Change your dressing as told by your health care provider.  ? Leave stitches (sutures), skin glue, or adhesive strips in place. These skin closures may need to stay in place for 2 weeks or longer. If adhesive strip edges start to loosen and curl up, you may trim the loose edges. Do not remove adhesive strips completely unless your health care provider tells you to do that.  · Check your incision area every day for signs of infection. Check for:  ? More redness, swelling, or pain.  ? Fluid or blood.  ? Warmth.  ? Pus or a bad smell.  Managing pain, stiffness, and swelling    · If directed, put ice on the affected area.  ? Put ice in a plastic bag.  ? Place a towel between your skin and the bag.  ? Leave the ice on for 20 minutes, 2-3 times a day.  · Move your toes  often to reduce stiffness and swelling.  · Raise (elevate) the injured area above the level of your heart while you are lying down.  Activity  · Rest as told by your health care provider.  · Use your crutches or walker as told by your health care provider. Your health care provider will let you know how much weight you can put on your leg. Your health care provider will do X-rays to check bone healing. As healing progresses, you may be allowed to put more weight on your leg.  · Avoid sitting for a long time without moving. Get up to take short walks every 1-2 hours. This is important to improve blood flow and breathing. Ask for help if you feel weak or unsteady.  · Keep all your physical therapy appointments.  · Do exercises as told by your health care provider. These exercises will prevent weakness and stiffness in your hip.  · Return to your normal activities as told by your health care provider. Ask your health care provider what activities are safe for you. It may take several months to heal completely.  · Ask your health care provider when it is safe to drive.  General instructions  · Do not use any products that contain nicotine or tobacco, such as cigarettes, e-cigarettes, and chewing tobacco. These can delay bone healing after surgery. If you need help quitting, ask your health care provider.  · Take steps to prevent falls at home, such as removing throw rugs and tripping hazards.  · Keep all follow-up visits as told by your health care provider. This is important.  Contact a health care provider if:  · You are not getting relief from your pain medicine.  · You have more redness, swelling, or pain around your incision.  · You have fluid or blood coming from your incision.  · Your incision feels warm to the touch.  · You have pus or a bad smell coming from your incision.  Get help right away if:  · You have a fever and chills.  · You have chest pain or trouble breathing.  · Your incision breaks open.  · You  have severe pain that does not get better with medicine.  Summary  · After your surgery, it is normal to have some pain, swelling, and tenderness.  · Take over-the-counter and prescription medicines only as told by your health care provider.  · Follow instructions from your health care provider about how to take care of your incision. Check your incision area every day for signs of infection.  · Return to your normal activities as told by your health care provider. Ask your health care provider what activities are safe for you.  · It may take several months to heal completely. Keep all follow-up visits as told by your health care provider.  This information is not intended to replace advice given to you by your health care provider. Make sure you discuss any questions you have with your health care provider.  Document Released: 10/21/2019 Document Revised: 10/21/2019 Document Reviewed: 10/21/2019  Elsevier Patient Education © 2020 [employer]      Fall Prevention in the Home, Adult  Falls can cause injuries. They can happen to people of all ages. There are many things you can do to make your home safe and to help prevent falls. Ask for help when making these changes, if needed.  What actions can I take to prevent falls?  General Instructions  · Use good lighting in all rooms. Replace any light bulbs that burn out.  · Turn on the lights when you go into a dark area. Use night-lights.  · Keep items that you use often in easy-to-reach places. Lower the shelves around your home if necessary.  · Set up your furniture so you have a clear path. Avoid moving your furniture around.  · Do not have throw rugs and other things on the floor that can make you trip.  · Avoid walking on wet floors.  · If any of your floors are uneven, fix them.  · Add color or contrast paint or tape to clearly major and help you see:  ? Any grab bars or handrails.  ? First and last steps of stairways.  ? Where the edge of each step is.  · If you  use a stepladder:  ? Make sure that it is fully opened. Do not climb a closed stepladder.  ? Make sure that both sides of the stepladder are locked into place.  ? Ask someone to hold the stepladder for you while you use it.  · If there are any pets around you, be aware of where they are.  What can I do in the bathroom?         · Keep the floor dry. Clean up any water that spills onto the floor as soon as it happens.  · Remove soap buildup in the tub or shower regularly.  · Use non-skid mats or decals on the floor of the tub or shower.  · Attach bath mats securely with double-sided, non-slip rug tape.  · If you need to sit down in the shower, use a plastic, non-slip stool.  · Install grab bars by the toilet and in the tub and shower. Do not use towel bars as grab bars.  What can I do in the bedroom?  · Make sure that you have a light by your bed that is easy to reach.  · Do not use any sheets or blankets that are too big for your bed. They should not hang down onto the floor.  · Have a firm chair that has side arms. You can use this for support while you get dressed.  What can I do in the kitchen?  · Clean up any spills right away.  · If you need to reach something above you, use a strong step stool that has a grab bar.  · Keep electrical cords out of the way.  · Do not use floor polish or wax that makes floors slippery. If you must use wax, use non-skid floor wax.  What can I do with my stairs?  · Do not leave any items on the stairs.  · Make sure that you have a light switch at the top of the stairs and the bottom of the stairs. If you do not have them, ask someone to add them for you.  · Make sure that there are handrails on both sides of the stairs, and use them. Fix handrails that are broken or loose. Make sure that handrails are as long as the stairways.  · Install non-slip stair treads on all stairs in your home.  · Avoid having throw rugs at the top or bottom of the stairs. If you do have throw rugs, attach  them to the floor with carpet tape.  · Choose a carpet that does not hide the edge of the steps on the stairway.  · Check any carpeting to make sure that it is firmly attached to the stairs. Fix any carpet that is loose or worn.  What can I do on the outside of my home?  · Use bright outdoor lighting.  · Regularly fix the edges of walkways and driveways and fix any cracks.  · Remove anything that might make you trip as you walk through a door, such as a raised step or threshold.  · Trim any bushes or trees on the path to your home.  · Regularly check to see if handrails are loose or broken. Make sure that both sides of any steps have handrails.  · Install guardrails along the edges of any raised decks and porches.  · Clear walking paths of anything that might make someone trip, such as tools or rocks.  · Have any leaves, snow, or ice cleared regularly.  · Use sand or salt on walking paths during winter.  · Clean up any spills in your garage right away. This includes grease or oil spills.  What other actions can I take?  · Wear shoes that:  ? Have a low heel. Do not wear high heels.  ? Have rubber bottoms.  ? Are comfortable and fit you well.  ? Are closed at the toe. Do not wear open-toe sandals.  · Use tools that help you move around (mobility aids) if they are needed. These include:  ? Canes.  ? Walkers.  ? Scooters.  ? Crutches.  · Review your medicines with your doctor. Some medicines can make you feel dizzy. This can increase your chance of falling.  Ask your doctor what other things you can do to help prevent falls.  Where to find more information  · Centers for Disease Control and Prevention, [place]: https://cdc.gov  · National [place] on Aging: https://pi6fqdm.timothy.nih.gov  Contact a doctor if:  · You are afraid of falling at home.  · You feel weak, drowsy, or dizzy at home.  · You fall at home.  Summary  · There are many simple things that you can do to make your home safe and to help prevent  falls.  · Ways to make your home safe include removing tripping hazards and installing grab bars in the bathroom.  · Ask for help when making these changes in your home.  This information is not intended to replace advice given to you by your health care provider. Make sure you discuss any questions you have with your health care provider.  Document Released: 10/14/2010 Document Revised: 04/09/2020 Document Reviewed: 08/02/2018  Elsevier Patient Education © 2020 [employer]      Suicidal Feelings: How to Help Yourself  Suicide is when you end your own life. There are many things you can do to help yourself feel better when struggling with these feelings. Many services and people are available to support you and others who struggle with similar feelings.   If you ever feel like you may hurt yourself or others, or have thoughts about taking your own life, get help right away. To get help:  · Call your local emergency services (911 in the [place]  · The [facility] and human services helpline (211 in the [place]  · Go to your nearest emergency department.  · Call a suicide hotline to speak with a trained counselor. The following suicide hotlines are available in the [place]:  ? 2-334-876-TALK (1-980.485.3740).  ? 7-867-XWXBDVD (1-477.203.5060).  ? 1-343.453.5259. This is a hotline for Sinhala speakers.  ? 1-142.571.2248. This is a hotline for TTY users.  ? 9-898-5-U-OJ (1-721.674.9522). This is a hotline for lesbian, daugherty, bisexual, transgender, or questioning youth.  ? For a list of hotlines in [place], visit www.suicide.org/hotlines/international/chslal-gmxfvss-crlyhhkd.html  · Contact a crisis center or a local suicide prevention center. To find a crisis center or suicide prevention center:  ? Call your local hospital, clinic, community service organization, mental health center, social service provider, or health department. Ask for help with connecting to a crisis center.  ? For a list of crisis  centers in the [place], visit: suicidepreventionlifeline.org  ? For a list of crisis centers in [place], visit: suicideprevention.ca  How to help yourself feel better    · Promise yourself that you will not do anything extreme when you have suicidal feelings. Remember, there is hope. Many people have gotten through suicidal thoughts and feelings, and you can too. If you have had these feelings before, remind yourself that you can get through them again.  · Let family, friends, teachers, or counselors know how you are feeling. Try not to separate yourself from those who care about you and want to help you. Talk with someone every day, even if you do not feel sociable. Face-to-face conversation is best to help them understand your feelings.  · Contact a mental health care provider and work with this person regularly.  · Make a safety plan that you can follow during a crisis. Include phone numbers of suicide prevention hotlines, mental health professionals, and trusted friends and family members you can call during an emergency. Save these numbers on your phone.  · If you are thinking of taking a lot of medicine, give your medicine to someone who can give it to you as prescribed. If you are on antidepressants and are concerned you will overdose, tell your health care provider so that he or she can give you safer medicines.  · Try to stick to your routines. Follow a schedule every day. Make self-care a priority.  · Make a list of realistic goals, and cross them off when you achieve them. Accomplishments can give you a sense of worth.  · Wait until you are feeling better before doing things that you find difficult or unpleasant.  · Do things that you have always enjoyed to take your mind off your feelings. Try reading a book, or listening to or playing music. Spending time outside, in nature, may help you feel better.  Follow these instructions at home:    · Visit your primary health care provider every year for a  checkup.  · Work with a mental health care provider as needed.  · Eat a well-balanced diet, and eat regular meals.  · Get plenty of rest.  · Exercise if you are able. Just 30 minutes of exercise each day can help you feel better.  · Take over-the-counter and prescription medicines only as told by your health care provider. Ask your mental health care provider about the possible side effects of any medicines you are taking.  · Do not use alcohol or drugs, and remove these substances from your home.  · Remove weapons, poisons, knives, and other deadly items from your home.  General recommendations  · Keep your living space well lit.  · When you are feeling well, write yourself a letter with tips and support that you can read when you are not feeling well.  · Remember that life's difficulties can be sorted out with help. Conditions can be treated, and you can learn behaviors and ways of thinking that will help you.  Where to find more information  · National Suicide Prevention Lifeline: www.suicidepreventionlifeline.org  · Hopeline: www.Loopt.[place]  · American Foundation for Suicide Prevention: www.afsp.org  · The Derek Project (for lesbian, daugherty, bisexual, transgender, or questioning youth): www.thetrevorproject.org  Contact a health care provider if:  · You feel as though you are a burden to others.  · You feel agitated, angry, vengeful, or have extreme mood swings.  · You have withdrawn from family and friends.  Get help right away if:  · You are talking about suicide or wishing to die.  · You start making plans for how to commit suicide.  · You feel that you have no reason to live.  · You start making plans for putting your affairs in order, saying goodbye, or giving your possessions away.  · You feel guilt, shame, or unbearable pain, and it seems like there is no way out.  · You are frequently using drugs or alcohol.  · You are engaging in risky behaviors that could lead to death.  If you have any of these  symptoms, get help right away. Call emergency services, go to your nearest emergency department or crisis center, or call a suicide crisis helpline.  Summary  · Suicide is when you take your own life.  · Promise yourself that you will not do anything extreme when you have suicidal feelings.  · Let family, friends, teachers, or counselors know how you are feeling.  · Get help right away if you feel as though life is getting too tough to handle and you are thinking about suicide.  This information is not intended to replace advice given to you by your health care provider. Make sure you discuss any questions you have with your health care provider.  Document Released: 06/23/2004 Document Revised: 04/09/2020 Document Reviewed: 07/31/2018  Elsevier Patient Education © 2020 Elsevier Inc.

## 2021-08-15 NOTE — PROGRESS NOTES
[facility] Medicine Daily Progress Note    Date of Service  8/15/2021    Chief Complaint:  Chest pain  Hypertension    Interval History:  No significant events or changes since last visit    Review of Systems  Review of Systems   Constitutional: Negative for fever.   Eyes: Negative for blurred vision.   Respiratory: Negative for cough.    Cardiovascular: Negative for chest pain.   Gastrointestinal: Negative for diarrhea.   Musculoskeletal: Negative for joint pain.   Neurological: Negative for dizziness.   Psychiatric/Behavioral: The patient is not nervous/anxious.         Physical Exam  Temp:  [36.8 °C (98.2 °F)] 36.8 °C (98.2 °F)  Pulse:  [71-78] 75  Resp:  [16-18] 18  BP: (102-148)/(66-81) 148/81  SpO2:  [92 %-96 %] 96 %    Physical Exam  Vitals and nursing note reviewed.   Constitutional:       Appearance: He is not diaphoretic.   HENT:      Mouth/Throat:      Pharynx: No oropharyngeal exudate or posterior oropharyngeal erythema.   Eyes:      Extraocular Movements: Extraocular movements intact.   Neck:      Vascular: No carotid bruit.   Cardiovascular:      Rate and Rhythm: Normal rate and regular rhythm.   Pulmonary:      Effort: Pulmonary effort is normal.      Breath sounds: No wheezing or rales.   Abdominal:      General: There is no distension.      Palpations: Abdomen is soft.      Tenderness: There is no abdominal tenderness.   Musculoskeletal:      Right lower leg: No edema.      Left lower leg: No edema.   Skin:     General: Skin is warm and dry.   Neurological:      Mental Status: He is alert and oriented to person, place, and time.   Psychiatric:         Mood and Affect: Mood normal.         Behavior: Behavior normal.         Fluids    Intake/Output Summary (Last 24 hours) at 8/15/2021 0837  Last data filed at 8/15/2021 0600  Gross per 24 hour   Intake 480 ml   Output 800 ml   Net -320 ml       Laboratory                        Imaging      Assessment/Plan  * Closed left hip fracture (HCC)- (present on  admission)  Assessment & Plan  S/P surgical repair (8/4)    Vitamin D insufficiency  Assessment & Plan  Vit D: 28  On supplements    Chest pain  Assessment & Plan  Has hx of angina and occ CP  Developed CP on 8/10: located substernally, no radiation, pressure like, lasted 30 minutes  Had assoc of nausea, chills & sweats, shakes  Relieved with SL Nitro  On Nitro prn  On Imdur  EKG: no changes from prior  Trop 16    CAD (coronary artery disease) of artery bypass graft- (present on admission)  Assessment & Plan  Has Hx of CABG x 2 vessels  Has hx of 6 cardiac stents  On Lopressor  On Lisinopril  On Imdur  On Plavix  On Lipitor    Essential hypertension- (present on admission)  Assessment & Plan  BP ok  On Lopressor  On Lisinopril   Note: on Flomax  Cont to monitor    Hypothyroidism- (present on admission)  Assessment & Plan  TSH wnl (8/7)  On Synthroid

## 2021-08-15 NOTE — CARE PLAN
"Pt with pacemaker inserted as per pt 11 years ago, battery was replaced 4 months ago. Vital signs stable, denies any untoward s/s .    Problem: Fall Risk - Rehab  Goal: Patient will remain free from falls  Outcome: Progressing  Note: Corie Rm Fall risk Assessment Score: 15    High fall risk Interventions   - Bed and strip alarm   - Yellow sign by the door   - Yellow wrist band \"Fall risk\"  - Do not leave patient unattended in the bathroom  - Fall risk education provided    Problem: Incision Care  Goal: Optimal post surgical incision care  Outcome: Progressing  Note: Post of incision site to the left hip and left thigh with staples well approximated , biotane dressing intact.     Problem: Pain - Standard  Goal: Alleviation of pain or a reduction in pain to the patient’s comfort goal  Outcome: Progressing  Note: Assessed for pain and discomfort, pt stated feeling better, pain under control .     The patient is Stable - Low risk of patient condition declining or worsening    Shift Goals  Clinical Goals: sleep  Patient Goals: sleep    Progress made toward(s) clinical / shift goals: Pt free from fall and injury, able to sleep well.  Patient is not progressing towards the following goals:Cont monitored.      "

## 2021-08-16 NOTE — CARE PLAN
Problem: Recreation Therapy  Goal: STG-Within one week, patient will demonstrate leisure problem solving by sharing on two positive lesiure activities they would like to participate in either in session or during the free time and any perceived barriers to their participation.  Description:     Outcome: Met  Goal: STG-Within one week, patient will contact the library to set up a library card so that he could listen to audio books.   Outcome: Met  Goal: LTG-By discharge, patient will demonstrate leisure problem solving by sharing on two positive lesiure activities they would like to participate in following discharge and any perceived barriers to their participation.  Outcome: Met  Goal: LTG-By discharge, patient will attend the adaptive tech class.   Outcome: Met

## 2021-08-16 NOTE — CARE PLAN
Problem: Knowledge Deficit - Standard  Goal: Patient and family/care givers will demonstrate understanding of plan of care, disease process/condition, diagnostic tests and medications  Outcome: Met  Note: Patient discharged to home per order.  Discharge instructions reviewed with patient and girlfriend; they verbalize understanding and signed copies placed in chart.  Patient has all belongings; signed copy of form in chart.  Patient left facility at 1214 via wheelchair accompanied by rehab staff and girlfriend.  Have enjoyed working with this pleasant patient.

## 2021-08-18 NOTE — DISCHARGE PLANNING
Per Aric at [facility], referral accepted and patient has been scheduled for next Monday.

## 2021-08-30 ENCOUNTER — APPOINTMENT (OUTPATIENT)
Dept: PHYSICAL MEDICINE AND REHAB | Facility: REHABILITATION | Age: 66
End: 2021-08-30
Payer: MEDICARE

## 2021-12-15 ENCOUNTER — TELEPHONE (OUTPATIENT)
Dept: CARDIOLOGY | Facility: MEDICAL CENTER | Age: 66
End: 2021-12-15

## 2021-12-15 DIAGNOSIS — I25.810 CORONARY ARTERY DISEASE INVOLVING CORONARY BYPASS GRAFT WITHOUT ANGINA PECTORIS, UNSPECIFIED WHETHER NATIVE OR TRANSPLANTED HEART: ICD-10-CM

## 2021-12-15 NOTE — TELEPHONE ENCOUNTER
Randy Pollard,    Has previous cardio providers Dr. Rascon in [place], [place] and has had previous tests and surgery's done at the below facilities in the last year.      Dr. Rascon (Cardiologist)   [address] [place] [address]  Ph. [phone]    [facility] for all surgerys and testing in [place] as well.      Thank you,    ANNIA

## 2021-12-15 NOTE — PROGRESS NOTES
Received: Today  Latrice Donis, Med Ass't  SAQIB Arboleda,     Can you please help me by ordering an EKGs for Dr. Snyder's Telemedicine on 12/29/2021. MRNs [id number] all are new patients. Please let me know when they are ready to go.     Thank you!   ===============    Order placed

## 2021-12-15 NOTE — TELEPHONE ENCOUNTER
Chart Prep     Called patient in regards to records for NP appointment w/Dr. Laurent to review most recent lab results, ekg, any cardiac testing or ,if he has been treated by a cardiologist . No answer, left voicemail to call back.

## 2021-12-23 NOTE — TELEPHONE ENCOUNTER
Rodri Zarco, Med Ass't     ANNIA    12/15/21 12:57 PM  Note  Randy Pollard,     Has previous cardio providers Dr. Rascon in [place], [place] and has had previous tests and surgery's done at the below facilities in the last year.       Dr. Rascon (Cardiologist)   [address] [place] [address]  Ph. [phone]      for all surgerys and testing in [place] as well.        Thank you,                 Records have been requested from Dr. Rascon's office and from [facility] for all cardiac records. Fax confirmation received and sent to [facility] for scanning.    Pending records. To Meredith SHEPARD to follow up on records.    EKG order has been placed for upcoming Telemed Appt with [facility].

## 2021-12-29 ENCOUNTER — TELEMEDICINE2 (OUTPATIENT)
Dept: CARDIOLOGY | Facility: MEDICAL CENTER | Age: 66
End: 2021-12-29
Payer: MEDICARE

## 2021-12-29 VITALS
TEMPERATURE: 98.4 F | HEART RATE: 81 BPM | WEIGHT: 190 LBS | BODY MASS INDEX: 29.82 KG/M2 | OXYGEN SATURATION: 95 % | SYSTOLIC BLOOD PRESSURE: 126 MMHG | RESPIRATION RATE: 18 BRPM | HEIGHT: 67 IN | DIASTOLIC BLOOD PRESSURE: 84 MMHG

## 2021-12-29 DIAGNOSIS — I20.9 ANGINA PECTORIS (HCC): ICD-10-CM

## 2021-12-29 DIAGNOSIS — I71.9 AORTIC ANEURYSM WITHOUT RUPTURE, UNSPECIFIED PORTION OF AORTA (HCC): ICD-10-CM

## 2021-12-29 DIAGNOSIS — R94.31 ABNORMAL ELECTROCARDIOGRAM (ECG) (EKG): ICD-10-CM

## 2021-12-29 DIAGNOSIS — I25.708 CORONARY ARTERY DISEASE INVOLVING CORONARY BYPASS GRAFT WITH OTHER FORMS OF ANGINA PECTORIS, UNSPECIFIED WHETHER NATIVE OR TRANSPLANTED HEART (HCC): ICD-10-CM

## 2021-12-29 PROCEDURE — 99204 OFFICE O/P NEW MOD 45 MIN: CPT | Performed by: INTERNAL MEDICINE

## 2021-12-29 RX ORDER — METOPROLOL SUCCINATE 50 MG/1
50 TABLET, EXTENDED RELEASE ORAL
Qty: 60 TABLET | Refills: 3 | Status: ON HOLD | OUTPATIENT
Start: 2021-12-29 | End: 2022-01-06

## 2021-12-29 RX ORDER — NITROGLYCERIN 0.4 MG/1
0.4 TABLET SUBLINGUAL
Qty: 25 TABLET | Refills: 2 | Status: SHIPPED | OUTPATIENT
Start: 2021-12-29 | End: 2023-07-03 | Stop reason: SDUPTHER

## 2021-12-29 RX ORDER — VITAMIN B COMPLEX
1000 TABLET ORAL DAILY
COMMUNITY
End: 2024-01-05

## 2021-12-29 RX ORDER — RANOLAZINE 500 MG/1
500 TABLET, EXTENDED RELEASE ORAL 2 TIMES DAILY
Qty: 60 TABLET | Refills: 3 | Status: SHIPPED | OUTPATIENT
Start: 2021-12-29 | End: 2021-12-29

## 2021-12-29 ASSESSMENT — ENCOUNTER SYMPTOMS
PND: 0
IRREGULAR HEARTBEAT: 0
WEIGHT GAIN: 0
HEARTBURN: 0
ALTERED MENTAL STATUS: 0
DIARRHEA: 0
VOMITING: 0
FEVER: 0
SYNCOPE: 0
PALPITATIONS: 0
BACK PAIN: 0
COUGH: 0
CLAUDICATION: 0
NAUSEA: 0
DYSPNEA ON EXERTION: 1
ORTHOPNEA: 0
ABDOMINAL PAIN: 0
FLANK PAIN: 0
BLURRED VISION: 0
DEPRESSION: 0
WEIGHT LOSS: 0
NEAR-SYNCOPE: 0
DIZZINESS: 0
SHORTNESS OF BREATH: 0
DECREASED APPETITE: 0
CONSTIPATION: 0

## 2021-12-29 ASSESSMENT — FIBROSIS 4 INDEX: FIB4 SCORE: 1.8

## 2021-12-29 NOTE — PROGRESS NOTES
This evaluation was conducted via telemed using secure and encrypted videoconferencing technology. The patient was in a private location in the state of [place].     The patient's identity was confirmed and verbal consent was obtained for this virtual visit.     Cardiology Note    Chief Complaint   Patient presents with   • Coronary Artery Disease   • HTN (Controlled)   • Chest Pain       History of Present Illness: Antony Akbar is a 66 y.o. male PMH hypothyroidism, COPD, HTN, CAD/PCI s/p CABG 2012, carotid stenosis, PVD AGUSTIN s/p PCI, HLD, PPM who presents for initial visit.    Previously treated by cardiology in [place], [place]. [facility]. This visit describes previously on hospice. Can't specify if for cardiac reason but seems as though. Was told too complex to cure. Has progressive symptoms of angina. Now at least three episodes daily. Requires additional sublingual nitroglycerin to his long acting imdur and beta blockers. Very limited. Doesn't have access to cardiac imaging where he lives. Doesn't have access to transportation to get to [place]. Denies toxic social habits.    Review of Systems   Constitutional: Negative for decreased appetite, fever, malaise/fatigue, weight gain and weight loss.   HENT: Negative for congestion and nosebleeds.    Eyes: Negative for blurred vision.   Cardiovascular: Positive for chest pain, dyspnea on exertion and leg swelling. Negative for claudication, irregular heartbeat, near-syncope, orthopnea, palpitations, paroxysmal nocturnal dyspnea and syncope.   Respiratory: Negative for cough and shortness of breath.    Endocrine: Negative for cold intolerance and heat intolerance.   Skin: Negative for rash.   Musculoskeletal: Negative for back pain.   Gastrointestinal: Negative for abdominal pain, constipation, diarrhea, heartburn, melena, nausea and vomiting.   Genitourinary: Negative for dysuria, flank pain and hematuria.   Neurological: Negative for dizziness.    Psychiatric/Behavioral: Negative for altered mental status and depression.         Past Medical History:   Diagnosis Date   • Asthma    • Back pain    • BPH (benign prostatic hyperplasia)    • Chronic obstructive pulmonary disease (HCC)    • Hypertension    • Hypothyroid          Past Surgical History:   Procedure Laterality Date   • PB OPEN FIX INTER/SUBTROCH FX,IMPLNT Left 8/4/2021    Procedure: INSERTION, INTRAMEDULLARY MIKAEL, FEMUR, PROXIMAL;  Surgeon: Valentin Ríos M.D.;  Location: SURGERY [facility];  Service: Orthopedics   • CARDIAC CATH, RIGHT/LEFT HEART           Current Outpatient Medications   Medication Sig Dispense Refill   • vitamin D3 (CHOLECALCIFEROL) 1000 Unit (25 mcg) Tab Take 1,000 Units by mouth every day.     • metoprolol SR (TOPROL XL) 50 MG TABLET SR 24 HR Take 1 Tablet by mouth 2 (two) times a day. 60 Tablet 3   • nitroglycerin (NITROSTAT) 0.4 MG SL Tab Place 1 Tablet under the tongue 1 time a day as needed for Chest Pain (chest pain). 25 Tablet 2   • atorvastatin (LIPITOR) 40 MG Tab Take 1 Tablet by mouth every day. 90 Tablet 2   • cloNIDine (CATAPRES) 0.1 MG Tab Take 1 Tablet by mouth every morning. (Patient taking differently: Take 0.1 mg by mouth as needed.) 90 Tablet 2   • clopidogrel (PLAVIX) 75 MG Tab Take 1 Tablet by mouth every morning. 90 Tablet 2   • cyclobenzaprine (FLEXERIL) 10 mg Tab Take 1 Tablet by mouth 3 times a day as needed for Moderate Pain. 30 Tablet 0   • famotidine (PEPCID) 40 MG Tab Take 1 Tablet by mouth every evening. 90 Tablet 2   • isosorbide mononitrate SR (IMDUR) 60 MG TABLET SR 24 HR Take 1 Tablet by mouth 2 times a day. 180 Tablet 2   • lisinopril (PRINIVIL) 40 MG tablet Take 1 Tablet by mouth every day. 90 Tablet 2   • tamsulosin (FLOMAX) 0.4 MG capsule Take 1 Capsule by mouth every evening. 90 Capsule 2   • gabapentin (NEURONTIN) 100 MG Cap Take 100 mg by mouth 4 times a day.     • levothyroxine (SYNTHROID) 25 MCG Tab Take 50 mcg by mouth see  administration instructions.     • sertraline (ZOLOFT) 100 MG Tab Take 100 mg by mouth every evening.     • EPINEPHrine (EPIPEN 2-VEL) 0.3 MG/0.3ML Solution Auto-injector solution for injection Inject 0.3 mg into the shoulder, thigh, or buttocks as needed.     • albuterol 108 (90 Base) MCG/ACT Aero Soln inhalation aerosol Inhale 2 Puffs every 6 hours as needed for Shortness of Breath.       No current facility-administered medications for this visit.         Allergies   Allergen Reactions   • Bee Venom Anaphylaxis         History reviewed. No pertinent family history.      Social History     Socioeconomic History   • Marital status: Single     Spouse name: Not on file   • Number of children: Not on file   • Years of education: Not on file   • Highest education level: Not on file   Occupational History   • Not on file   Tobacco Use   • Smoking status: Former Smoker   • Smokeless tobacco: Never Used   Vaping Use   • Vaping Use: Never used   Substance and Sexual Activity   • Alcohol use: Not Currently   • Drug use: Not Currently   • Sexual activity: Not on file   Other Topics Concern   • Not on file   Social History Narrative   • Not on file     Social Determinants of Health     Financial Resource Strain:    • Difficulty of Paying Living Expenses: Not on file   Food Insecurity:    • Worried About Running Out of Food in the Last Year: Not on file   • Ran Out of Food in the Last Year: Not on file   Transportation Needs:    • Lack of Transportation (Medical): Not on file   • Lack of Transportation (Non-Medical): Not on file   Physical Activity:    • Days of Exercise per Week: Not on file   • Minutes of Exercise per Session: Not on file   Stress:    • Feeling of Stress : Not on file   Social Connections:    • Frequency of Communication with Friends and Family: Not on file   • Frequency of Social Gatherings with Friends and Family: Not on file   • Attends Christian Services: Not on file   • Active Member of Clubs or  "Organizations: Not on file   • Attends Club or Organization Meetings: Not on file   • Marital Status: Not on file   Intimate Partner Violence:    • Fear of Current or Ex-Partner: Not on file   • Emotionally Abused: Not on file   • Physically Abused: Not on file   • Sexually Abused: Not on file   Housing Stability:    • Unable to Pay for Housing in the Last Year: Not on file   • Number of Places Lived in the Last Year: Not on file   • Unstable Housing in the Last Year: Not on file         Physical Exam:  Ambulatory Vitals  /84 (BP Location: Right arm, Patient Position: Sitting, BP Cuff Size: Adult)   Pulse 81   Temp 36.9 °C (98.4 °F) (Oral)   Resp 18   Ht 1.702 m (5' 7\")   Wt 86.2 kg (190 lb)   SpO2 95%    BP Readings from Last 4 Encounters:   12/29/21 126/84   08/15/21 148/81   08/06/21 159/84     Weight/BMI:   Vitals:    12/29/21 1349   BP: 126/84   Weight: 86.2 kg (190 lb)   Height: 1.702 m (5' 7\")    Body mass index is 29.76 kg/m².  Wt Readings from Last 4 Encounters:   12/29/21 86.2 kg (190 lb)   08/15/21 81.5 kg (179 lb 10.8 oz)   08/04/21 85.5 kg (188 lb 7.9 oz)       Physical Exam  Physical Exam:  Constitutional: Alert, no distress, well-groomed.  Skin: No rashes in visible areas.  Eye: Round. Conjunctiva clear, lids normal. No icterus.   ENMT: Lips pink without lesions, good dentition, moist mucous membranes. Phonation normal.  Neck: No masses, no thyromegaly. Moves freely without pain.  CV: Pulse as reported by patient  Respiratory: Unlabored respiratory effort, no cough or audible wheeze  Psych: Alert and oriented x3, normal affect and mood.     Lab Data Review:  No results found for: CHOLSTRLTOT, LDL, HDL, TRIGLYCERIDE    Lab Results   Component Value Date/Time    SODIUM 140 08/07/2021 05:23 AM    POTASSIUM 3.6 08/07/2021 05:23 AM    CHLORIDE 104 08/07/2021 05:23 AM    CO2 22 08/07/2021 05:23 AM    GLUCOSE 101 (H) 08/07/2021 05:23 AM    BUN 11 08/07/2021 05:23 AM    CREATININE 0.78 08/07/2021 " 05:23 AM     CrCl cannot be calculated (Patient's most recent lab result is older than the maximum 7 days allowed.).  Lab Results   Component Value Date/Time    ALKPHOSPHAT 97 08/07/2021 05:23 AM    ASTSGOT 15 08/07/2021 05:23 AM    ALTSGPT 10 08/07/2021 05:23 AM    TBILIRUBIN 0.5 08/07/2021 05:23 AM      Lab Results   Component Value Date/Time    WBC 10.1 08/08/2021 05:32 AM     Lab Results   Component Value Date/Time    HBA1C 5.8 (H) 08/07/2021 05:23 AM     No components found for: TROP      Cardiac Imaging and Procedures Review:      EKG pending    Medical Decision Making:  Problem List Items Addressed This Visit     CAD (coronary artery disease) of artery bypass graft    Relevant Medications    metoprolol SR (TOPROL XL) 50 MG TABLET SR 24 HR    nitroglycerin (NITROSTAT) 0.4 MG SL Tab    Angina pectoris (HCC)    Relevant Medications    metoprolol SR (TOPROL XL) 50 MG TABLET SR 24 HR    nitroglycerin (NITROSTAT) 0.4 MG SL Tab    Other Relevant Orders    EC-ECHOCARDIOGRAM COMPLETE W/O CONT    NM-CARDIAC STRESS TEST    Abnormal electrocardiogram (ECG) (EKG)     Relevant Orders    NM-CARDIAC STRESS TEST    Aortic aneurysm without rupture (HCC)    Relevant Medications    metoprolol SR (TOPROL XL) 50 MG TABLET SR 24 HR    nitroglycerin (NITROSTAT) 0.4 MG SL Tab    Other Relevant Orders    US-AORTA/ILIACS DUPLEX COMPLETE        Active symptoms with poor control of angina pectoris. Little information available from prior cardiologists. Requested patient write down data so can obtain. No recent testing or imaging. Estimate fastest and best control of symptoms is to seek hospitalization and possible transfer to [facility] for further management. This is plan A and expressed gives patient best change. A far less optimal alternative is plan B, to change metoprolol to long acting, refill sublingual nitro, and undergo repeat cardiac imaging. However, this is likely to have significant lag time and result in poor outcome. Discussed  at length with patient.     It was my pleasure to meet with Mr. Akbar.

## 2022-01-02 ENCOUNTER — TELEPHONE (OUTPATIENT)
Dept: CARDIOLOGY | Facility: MEDICAL CENTER | Age: 67
End: 2022-01-02

## 2022-01-02 NOTE — TELEPHONE ENCOUNTER
PT at [facility] with CP for days, negative troponin and EKG per report    Impression CP with h/o severe CAD no objective indication of ACS    Would advise observation, pain management and transfer for angiogram when telemetry bed available.        It is my pleasure to participate in the care of Mr. Akbar.  Please do not hesitate to contact me with questions or concerns.    Shyam Myers MD PhD [facility]  Cardiologist [facility]    Please note that this dictation was created using voice recognition software. There may be errors I did not discover before finalizing the note.     1/2/2022  9:59 AM

## 2022-01-03 ENCOUNTER — APPOINTMENT (OUTPATIENT)
Dept: RADIOLOGY | Facility: MEDICAL CENTER | Age: 67
DRG: 281 | End: 2022-01-03
Attending: EMERGENCY MEDICINE
Payer: MEDICARE

## 2022-01-03 ENCOUNTER — HOSPITAL ENCOUNTER (INPATIENT)
Facility: MEDICAL CENTER | Age: 67
LOS: 3 days | DRG: 281 | End: 2022-01-06
Attending: EMERGENCY MEDICINE | Admitting: STUDENT IN AN ORGANIZED HEALTH CARE EDUCATION/TRAINING PROGRAM
Payer: MEDICARE

## 2022-01-03 DIAGNOSIS — I20.0 UNSTABLE ANGINA (HCC): ICD-10-CM

## 2022-01-03 DIAGNOSIS — R65.10 SIRS (SYSTEMIC INFLAMMATORY RESPONSE SYNDROME) (HCC): ICD-10-CM

## 2022-01-03 DIAGNOSIS — I25.708 CORONARY ARTERY DISEASE INVOLVING CORONARY BYPASS GRAFT WITH OTHER FORMS OF ANGINA PECTORIS, UNSPECIFIED WHETHER NATIVE OR TRANSPLANTED HEART (HCC): ICD-10-CM

## 2022-01-03 DIAGNOSIS — D72.829 LEUKOCYTOSIS, UNSPECIFIED TYPE: ICD-10-CM

## 2022-01-03 DIAGNOSIS — R07.9 CHEST PAIN, UNSPECIFIED TYPE: ICD-10-CM

## 2022-01-03 PROBLEM — E78.5 HLD (HYPERLIPIDEMIA): Status: ACTIVE | Noted: 2022-01-03

## 2022-01-03 PROBLEM — A41.9 SEPSIS (HCC): Status: ACTIVE | Noted: 2022-01-03

## 2022-01-03 PROBLEM — I16.0 HYPERTENSIVE URGENCY: Status: ACTIVE | Noted: 2022-01-03

## 2022-01-03 PROBLEM — F41.9 ANXIETY: Status: ACTIVE | Noted: 2022-01-03

## 2022-01-03 LAB
ALBUMIN SERPL BCP-MCNC: 5.1 G/DL (ref 3.2–4.9)
ALBUMIN/GLOB SERPL: 2.2 G/DL
ALP SERPL-CCNC: 140 U/L (ref 30–99)
ALT SERPL-CCNC: 9 U/L (ref 2–50)
ANION GAP SERPL CALC-SCNC: 17 MMOL/L (ref 7–16)
AST SERPL-CCNC: 14 U/L (ref 12–45)
BASOPHILS # BLD AUTO: 0.5 % (ref 0–1.8)
BASOPHILS # BLD: 0.09 K/UL (ref 0–0.12)
BILIRUB SERPL-MCNC: 0.9 MG/DL (ref 0.1–1.5)
BUN SERPL-MCNC: 10 MG/DL (ref 8–22)
CALCIUM SERPL-MCNC: 9.5 MG/DL (ref 8.5–10.5)
CHLORIDE SERPL-SCNC: 105 MMOL/L (ref 96–112)
CO2 SERPL-SCNC: 21 MMOL/L (ref 20–33)
CREAT SERPL-MCNC: 0.84 MG/DL (ref 0.5–1.4)
EKG IMPRESSION: NORMAL
EOSINOPHIL # BLD AUTO: 0.03 K/UL (ref 0–0.51)
EOSINOPHIL NFR BLD: 0.2 % (ref 0–6.9)
ERYTHROCYTE [DISTWIDTH] IN BLOOD BY AUTOMATED COUNT: 50.5 FL (ref 35.9–50)
FLUAV RNA SPEC QL NAA+PROBE: NEGATIVE
FLUBV RNA SPEC QL NAA+PROBE: NEGATIVE
GLOBULIN SER CALC-MCNC: 2.3 G/DL (ref 1.9–3.5)
GLUCOSE SERPL-MCNC: 76 MG/DL (ref 65–99)
HCT VFR BLD AUTO: 47.6 % (ref 42–52)
HGB BLD-MCNC: 15.3 G/DL (ref 14–18)
IMM GRANULOCYTES # BLD AUTO: 0.09 K/UL (ref 0–0.11)
IMM GRANULOCYTES NFR BLD AUTO: 0.5 % (ref 0–0.9)
LYMPHOCYTES # BLD AUTO: 1.38 K/UL (ref 1–4.8)
LYMPHOCYTES NFR BLD: 7.5 % (ref 22–41)
MCH RBC QN AUTO: 25.9 PG (ref 27–33)
MCHC RBC AUTO-ENTMCNC: 32.1 G/DL (ref 33.7–35.3)
MCV RBC AUTO: 80.5 FL (ref 81.4–97.8)
MONOCYTES # BLD AUTO: 0.83 K/UL (ref 0–0.85)
MONOCYTES NFR BLD AUTO: 4.5 % (ref 0–13.4)
NEUTROPHILS # BLD AUTO: 16.02 K/UL (ref 1.82–7.42)
NEUTROPHILS NFR BLD: 86.8 % (ref 44–72)
NRBC # BLD AUTO: 0 K/UL
NRBC BLD-RTO: 0 /100 WBC
PLATELET # BLD AUTO: 218 K/UL (ref 164–446)
PMV BLD AUTO: 10.9 FL (ref 9–12.9)
POTASSIUM SERPL-SCNC: 3.6 MMOL/L (ref 3.6–5.5)
PROT SERPL-MCNC: 7.4 G/DL (ref 6–8.2)
RBC # BLD AUTO: 5.91 M/UL (ref 4.7–6.1)
RSV RNA SPEC QL NAA+PROBE: NEGATIVE
SARS-COV-2 RNA RESP QL NAA+PROBE: NOTDETECTED
SODIUM SERPL-SCNC: 143 MMOL/L (ref 135–145)
SPECIMEN SOURCE: NORMAL
TROPONIN T SERPL-MCNC: 20 NG/L (ref 6–19)
WBC # BLD AUTO: 18.4 K/UL (ref 4.8–10.8)

## 2022-01-03 PROCEDURE — C9803 HOPD COVID-19 SPEC COLLECT: HCPCS | Performed by: EMERGENCY MEDICINE

## 2022-01-03 PROCEDURE — 93005 ELECTROCARDIOGRAM TRACING: CPT | Performed by: EMERGENCY MEDICINE

## 2022-01-03 PROCEDURE — 700105 HCHG RX REV CODE 258: Performed by: STUDENT IN AN ORGANIZED HEALTH CARE EDUCATION/TRAINING PROGRAM

## 2022-01-03 PROCEDURE — 770000 HCHG ROOM/CARE - INTERMEDIATE ICU *

## 2022-01-03 PROCEDURE — A9270 NON-COVERED ITEM OR SERVICE: HCPCS | Performed by: STUDENT IN AN ORGANIZED HEALTH CARE EDUCATION/TRAINING PROGRAM

## 2022-01-03 PROCEDURE — 0241U HCHG SARS-COV-2 COVID-19 NFCT DS RESP RNA 4 TRGT MIC: CPT

## 2022-01-03 PROCEDURE — 700102 HCHG RX REV CODE 250 W/ 637 OVERRIDE(OP): Performed by: EMERGENCY MEDICINE

## 2022-01-03 PROCEDURE — 85025 COMPLETE CBC W/AUTO DIFF WBC: CPT

## 2022-01-03 PROCEDURE — 700117 HCHG RX CONTRAST REV CODE 255: Performed by: EMERGENCY MEDICINE

## 2022-01-03 PROCEDURE — 80053 COMPREHEN METABOLIC PANEL: CPT

## 2022-01-03 PROCEDURE — 700111 HCHG RX REV CODE 636 W/ 250 OVERRIDE (IP): Performed by: EMERGENCY MEDICINE

## 2022-01-03 PROCEDURE — 99223 1ST HOSP IP/OBS HIGH 75: CPT | Mod: AI | Performed by: STUDENT IN AN ORGANIZED HEALTH CARE EDUCATION/TRAINING PROGRAM

## 2022-01-03 PROCEDURE — 96365 THER/PROPH/DIAG IV INF INIT: CPT

## 2022-01-03 PROCEDURE — 84484 ASSAY OF TROPONIN QUANT: CPT

## 2022-01-03 PROCEDURE — 71275 CT ANGIOGRAPHY CHEST: CPT | Mod: ME

## 2022-01-03 PROCEDURE — 93005 ELECTROCARDIOGRAM TRACING: CPT

## 2022-01-03 PROCEDURE — 96367 TX/PROPH/DG ADDL SEQ IV INF: CPT

## 2022-01-03 PROCEDURE — 87040 BLOOD CULTURE FOR BACTERIA: CPT | Mod: 91

## 2022-01-03 PROCEDURE — 99285 EMERGENCY DEPT VISIT HI MDM: CPT

## 2022-01-03 PROCEDURE — 700105 HCHG RX REV CODE 258: Performed by: EMERGENCY MEDICINE

## 2022-01-03 PROCEDURE — 96375 TX/PRO/DX INJ NEW DRUG ADDON: CPT

## 2022-01-03 PROCEDURE — A9270 NON-COVERED ITEM OR SERVICE: HCPCS | Performed by: EMERGENCY MEDICINE

## 2022-01-03 PROCEDURE — 700111 HCHG RX REV CODE 636 W/ 250 OVERRIDE (IP): Performed by: STUDENT IN AN ORGANIZED HEALTH CARE EDUCATION/TRAINING PROGRAM

## 2022-01-03 PROCEDURE — 700102 HCHG RX REV CODE 250 W/ 637 OVERRIDE(OP): Performed by: STUDENT IN AN ORGANIZED HEALTH CARE EDUCATION/TRAINING PROGRAM

## 2022-01-03 PROCEDURE — 71045 X-RAY EXAM CHEST 1 VIEW: CPT

## 2022-01-03 RX ORDER — ALUMINA, MAGNESIA, AND SIMETHICONE 2400; 2400; 240 MG/30ML; MG/30ML; MG/30ML
30 SUSPENSION ORAL EVERY 4 HOURS PRN
Status: DISCONTINUED | OUTPATIENT
Start: 2022-01-03 | End: 2022-01-06 | Stop reason: HOSPADM

## 2022-01-03 RX ORDER — ACETAMINOPHEN 325 MG/1
650 TABLET ORAL EVERY 6 HOURS PRN
Status: DISCONTINUED | OUTPATIENT
Start: 2022-01-03 | End: 2022-01-06 | Stop reason: HOSPADM

## 2022-01-03 RX ORDER — REGADENOSON 0.08 MG/ML
0.4 INJECTION, SOLUTION INTRAVENOUS
Status: DISCONTINUED | OUTPATIENT
Start: 2022-01-03 | End: 2022-01-06 | Stop reason: HOSPADM

## 2022-01-03 RX ORDER — BISACODYL 10 MG
10 SUPPOSITORY, RECTAL RECTAL
Status: DISCONTINUED | OUTPATIENT
Start: 2022-01-03 | End: 2022-01-06 | Stop reason: HOSPADM

## 2022-01-03 RX ORDER — METOPROLOL SUCCINATE 25 MG/1
50 TABLET, EXTENDED RELEASE ORAL
Status: DISCONTINUED | OUTPATIENT
Start: 2022-01-03 | End: 2022-01-05

## 2022-01-03 RX ORDER — MORPHINE SULFATE 15 MG/1
15 TABLET, FILM COATED, EXTENDED RELEASE ORAL 3 TIMES DAILY PRN
COMMUNITY

## 2022-01-03 RX ORDER — ALBUTEROL SULFATE 90 UG/1
2 AEROSOL, METERED RESPIRATORY (INHALATION)
Status: DISCONTINUED | OUTPATIENT
Start: 2022-01-03 | End: 2022-01-06 | Stop reason: HOSPADM

## 2022-01-03 RX ORDER — CYCLOBENZAPRINE HCL 10 MG
10 TABLET ORAL 3 TIMES DAILY PRN
Status: DISCONTINUED | OUTPATIENT
Start: 2022-01-03 | End: 2022-01-06 | Stop reason: HOSPADM

## 2022-01-03 RX ORDER — LABETALOL HYDROCHLORIDE 5 MG/ML
10 INJECTION, SOLUTION INTRAVENOUS ONCE
Status: DISCONTINUED | OUTPATIENT
Start: 2022-01-03 | End: 2022-01-04

## 2022-01-03 RX ORDER — LORAZEPAM 2 MG/ML
2 INJECTION INTRAMUSCULAR EVERY 4 HOURS PRN
Status: DISCONTINUED | OUTPATIENT
Start: 2022-01-03 | End: 2022-01-04

## 2022-01-03 RX ORDER — POLYETHYLENE GLYCOL 3350 17 G/17G
1 POWDER, FOR SOLUTION ORAL
Status: DISCONTINUED | OUTPATIENT
Start: 2022-01-03 | End: 2022-01-06 | Stop reason: HOSPADM

## 2022-01-03 RX ORDER — ATORVASTATIN CALCIUM 40 MG/1
40 TABLET, FILM COATED ORAL DAILY
Status: DISCONTINUED | OUTPATIENT
Start: 2022-01-04 | End: 2022-01-06 | Stop reason: HOSPADM

## 2022-01-03 RX ORDER — MORPHINE SULFATE 4 MG/ML
2 INJECTION INTRAVENOUS
Status: DISCONTINUED | OUTPATIENT
Start: 2022-01-03 | End: 2022-01-06 | Stop reason: HOSPADM

## 2022-01-03 RX ORDER — AMINOPHYLLINE 25 MG/ML
100 INJECTION, SOLUTION INTRAVENOUS
Status: DISCONTINUED | OUTPATIENT
Start: 2022-01-03 | End: 2022-01-06 | Stop reason: HOSPADM

## 2022-01-03 RX ORDER — LEVOTHYROXINE SODIUM 0.05 MG/1
50 TABLET ORAL
COMMUNITY

## 2022-01-03 RX ORDER — CLONIDINE HYDROCHLORIDE 0.1 MG/1
0.1 TABLET ORAL EVERY MORNING
Status: DISCONTINUED | OUTPATIENT
Start: 2022-01-04 | End: 2022-01-06 | Stop reason: HOSPADM

## 2022-01-03 RX ORDER — ONDANSETRON 4 MG/1
4 TABLET, ORALLY DISINTEGRATING ORAL EVERY 4 HOURS PRN
Status: DISCONTINUED | OUTPATIENT
Start: 2022-01-03 | End: 2022-01-06 | Stop reason: HOSPADM

## 2022-01-03 RX ORDER — AMOXICILLIN 250 MG
2 CAPSULE ORAL 2 TIMES DAILY
Status: DISCONTINUED | OUTPATIENT
Start: 2022-01-03 | End: 2022-01-06 | Stop reason: HOSPADM

## 2022-01-03 RX ORDER — SODIUM CHLORIDE, SODIUM LACTATE, POTASSIUM CHLORIDE, CALCIUM CHLORIDE 600; 310; 30; 20 MG/100ML; MG/100ML; MG/100ML; MG/100ML
INJECTION, SOLUTION INTRAVENOUS CONTINUOUS
Status: DISCONTINUED | OUTPATIENT
Start: 2022-01-03 | End: 2022-01-04

## 2022-01-03 RX ORDER — PROMETHAZINE HYDROCHLORIDE 25 MG/1
12.5-25 TABLET ORAL EVERY 4 HOURS PRN
Status: DISCONTINUED | OUTPATIENT
Start: 2022-01-03 | End: 2022-01-06 | Stop reason: HOSPADM

## 2022-01-03 RX ORDER — FAMOTIDINE 20 MG/1
20 TABLET, FILM COATED ORAL EVERY EVENING
Status: DISCONTINUED | OUTPATIENT
Start: 2022-01-03 | End: 2022-01-06 | Stop reason: HOSPADM

## 2022-01-03 RX ORDER — CLOPIDOGREL BISULFATE 75 MG/1
75 TABLET ORAL EVERY MORNING
Status: DISCONTINUED | OUTPATIENT
Start: 2022-01-04 | End: 2022-01-06 | Stop reason: HOSPADM

## 2022-01-03 RX ORDER — PROCHLORPERAZINE EDISYLATE 5 MG/ML
5-10 INJECTION INTRAMUSCULAR; INTRAVENOUS EVERY 4 HOURS PRN
Status: DISCONTINUED | OUTPATIENT
Start: 2022-01-03 | End: 2022-01-06 | Stop reason: HOSPADM

## 2022-01-03 RX ORDER — LEVOTHYROXINE SODIUM 0.05 MG/1
50 TABLET ORAL
Status: DISCONTINUED | OUTPATIENT
Start: 2022-01-04 | End: 2022-01-06 | Stop reason: HOSPADM

## 2022-01-03 RX ORDER — ISOSORBIDE MONONITRATE 60 MG/1
60 TABLET, EXTENDED RELEASE ORAL 2 TIMES DAILY
Status: DISCONTINUED | OUTPATIENT
Start: 2022-01-03 | End: 2022-01-04

## 2022-01-03 RX ORDER — ONDANSETRON 2 MG/ML
4 INJECTION INTRAMUSCULAR; INTRAVENOUS EVERY 4 HOURS PRN
Status: DISCONTINUED | OUTPATIENT
Start: 2022-01-03 | End: 2022-01-06 | Stop reason: HOSPADM

## 2022-01-03 RX ORDER — PROMETHAZINE HYDROCHLORIDE 25 MG/1
12.5-25 SUPPOSITORY RECTAL EVERY 4 HOURS PRN
Status: DISCONTINUED | OUTPATIENT
Start: 2022-01-03 | End: 2022-01-06 | Stop reason: HOSPADM

## 2022-01-03 RX ORDER — GUAIFENESIN/DEXTROMETHORPHAN 100-10MG/5
10 SYRUP ORAL EVERY 6 HOURS PRN
Status: DISCONTINUED | OUTPATIENT
Start: 2022-01-03 | End: 2022-01-06 | Stop reason: HOSPADM

## 2022-01-03 RX ORDER — AZITHROMYCIN 500 MG/1
500 INJECTION, POWDER, LYOPHILIZED, FOR SOLUTION INTRAVENOUS ONCE
Status: COMPLETED | OUTPATIENT
Start: 2022-01-03 | End: 2022-01-03

## 2022-01-03 RX ORDER — LISINOPRIL 20 MG/1
40 TABLET ORAL DAILY
Status: DISCONTINUED | OUTPATIENT
Start: 2022-01-04 | End: 2022-01-06 | Stop reason: HOSPADM

## 2022-01-03 RX ORDER — LORAZEPAM 2 MG/ML
3 INJECTION INTRAMUSCULAR ONCE
Status: COMPLETED | OUTPATIENT
Start: 2022-01-03 | End: 2022-01-03

## 2022-01-03 RX ORDER — LABETALOL HYDROCHLORIDE 5 MG/ML
10 INJECTION, SOLUTION INTRAVENOUS EVERY 4 HOURS PRN
Status: DISCONTINUED | OUTPATIENT
Start: 2022-01-03 | End: 2022-01-06 | Stop reason: HOSPADM

## 2022-01-03 RX ORDER — NICOTINE 21 MG/24HR
14 PATCH, TRANSDERMAL 24 HOURS TRANSDERMAL
Status: DISCONTINUED | OUTPATIENT
Start: 2022-01-04 | End: 2022-01-06 | Stop reason: HOSPADM

## 2022-01-03 RX ORDER — MORPHINE SULFATE 15 MG/1
15 TABLET, FILM COATED, EXTENDED RELEASE ORAL 3 TIMES DAILY PRN
Status: DISCONTINUED | OUTPATIENT
Start: 2022-01-03 | End: 2022-01-05

## 2022-01-03 RX ADMIN — IOHEXOL 80 ML: 350 INJECTION, SOLUTION INTRAVENOUS at 17:04

## 2022-01-03 RX ADMIN — SERTRALINE 100 MG: 100 TABLET, FILM COATED ORAL at 23:07

## 2022-01-03 RX ADMIN — SODIUM CHLORIDE 3 G: 900 INJECTION INTRAVENOUS at 18:47

## 2022-01-03 RX ADMIN — CEFTRIAXONE SODIUM 2 G: 2 INJECTION, POWDER, FOR SOLUTION INTRAMUSCULAR; INTRAVENOUS at 23:05

## 2022-01-03 RX ADMIN — AZITHROMYCIN MONOHYDRATE 500 MG: 500 INJECTION, POWDER, LYOPHILIZED, FOR SOLUTION INTRAVENOUS at 19:25

## 2022-01-03 RX ADMIN — FAMOTIDINE 20 MG: 20 TABLET ORAL at 23:04

## 2022-01-03 RX ADMIN — LORAZEPAM 3 MG: 2 INJECTION INTRAMUSCULAR; INTRAVENOUS at 20:10

## 2022-01-03 RX ADMIN — MORPHINE SULFATE 15 MG: 15 TABLET, FILM COATED, EXTENDED RELEASE ORAL at 19:20

## 2022-01-03 RX ADMIN — ISOSORBIDE MONONITRATE 60 MG: 60 TABLET, EXTENDED RELEASE ORAL at 23:04

## 2022-01-03 RX ADMIN — METOPROLOL SUCCINATE 50 MG: 25 TABLET, FILM COATED, EXTENDED RELEASE ORAL at 23:04

## 2022-01-03 ASSESSMENT — HEART SCORE
HISTORY: MODERATELY SUSPICIOUS
RISK FACTORS: >2 RISK FACTORS OR HX OF ATHEROSCLEROTIC DISEASE
HEART SCORE: 6
AGE: 45-64
ECG: NON-SPECIFIC REPOLARIZATION DISTURBANCE
TROPONIN: 1-3 TIMES NORMAL LIMIT

## 2022-01-03 ASSESSMENT — FIBROSIS 4 INDEX: FIB4 SCORE: 1.8

## 2022-01-03 ASSESSMENT — ENCOUNTER SYMPTOMS
NERVOUS/ANXIOUS: 1
SHORTNESS OF BREATH: 1
NAUSEA: 1

## 2022-01-03 NOTE — ED TRIAGE NOTES
"Chief Complaint   Patient presents with   • Chest Pain     Pt reports awakening this morning with mid-left 9/10 chest pain that does not radiate. Pt reports being diaphoretic. Pt has a cardiac history of stents and a medtronic pacemaker placed and a double bypass surgery.      /99   Pulse (!) 123   Temp 36.7 °C (98.1 °F) (Temporal)   Resp 16   Ht 1.702 m (5' 7\")   Wt 86.2 kg (190 lb)   SpO2 92%   BMI 29.76 kg/m²     Pt is wheeled in and out of triage in a hospital wheelchair. Appropriate PPE worn throughout entire encounter. Pt placed back in the lobby and educated about triage process.  EKG performed in triage      "

## 2022-01-04 ENCOUNTER — APPOINTMENT (OUTPATIENT)
Dept: RADIOLOGY | Facility: MEDICAL CENTER | Age: 67
DRG: 281 | End: 2022-01-04
Attending: STUDENT IN AN ORGANIZED HEALTH CARE EDUCATION/TRAINING PROGRAM
Payer: MEDICARE

## 2022-01-04 PROBLEM — I20.0 UNSTABLE ANGINA (HCC): Status: ACTIVE | Noted: 2022-01-04

## 2022-01-04 PROBLEM — E87.8 ELECTROLYTE ABNORMALITY: Status: ACTIVE | Noted: 2022-01-04

## 2022-01-04 PROBLEM — E27.8 ADRENAL MASS (HCC): Status: ACTIVE | Noted: 2022-01-04

## 2022-01-04 LAB
ALBUMIN SERPL BCP-MCNC: 3.8 G/DL (ref 3.2–4.9)
ALBUMIN/GLOB SERPL: 1.7 G/DL
ALP SERPL-CCNC: 110 U/L (ref 30–99)
ALT SERPL-CCNC: 8 U/L (ref 2–50)
ANION GAP SERPL CALC-SCNC: 14 MMOL/L (ref 7–16)
APPEARANCE UR: CLEAR
APTT PPP: 26.9 SEC (ref 24.7–36)
AST SERPL-CCNC: 14 U/L (ref 12–45)
BASOPHILS # BLD AUTO: 0.5 % (ref 0–1.8)
BASOPHILS # BLD: 0.05 K/UL (ref 0–0.12)
BILIRUB SERPL-MCNC: 0.6 MG/DL (ref 0.1–1.5)
BILIRUB UR QL STRIP.AUTO: NEGATIVE
BUN SERPL-MCNC: 8 MG/DL (ref 8–22)
CALCIUM SERPL-MCNC: 8.6 MG/DL (ref 8.5–10.5)
CHLORIDE SERPL-SCNC: 107 MMOL/L (ref 96–112)
CHOLEST SERPL-MCNC: 103 MG/DL (ref 100–199)
CO2 SERPL-SCNC: 22 MMOL/L (ref 20–33)
COLOR UR: YELLOW
CREAT SERPL-MCNC: 0.86 MG/DL (ref 0.5–1.4)
D DIMER PPP IA.FEU-MCNC: 0.41 UG/ML (FEU) (ref 0–0.5)
EKG IMPRESSION: NORMAL
EOSINOPHIL # BLD AUTO: 0.19 K/UL (ref 0–0.51)
EOSINOPHIL NFR BLD: 1.7 % (ref 0–6.9)
ERYTHROCYTE [DISTWIDTH] IN BLOOD BY AUTOMATED COUNT: 52.9 FL (ref 35.9–50)
GLOBULIN SER CALC-MCNC: 2.2 G/DL (ref 1.9–3.5)
GLUCOSE SERPL-MCNC: 93 MG/DL (ref 65–99)
GLUCOSE UR STRIP.AUTO-MCNC: NEGATIVE MG/DL
HCT VFR BLD AUTO: 40.9 % (ref 42–52)
HDLC SERPL-MCNC: 33 MG/DL
HGB BLD-MCNC: 12.7 G/DL (ref 14–18)
IMM GRANULOCYTES # BLD AUTO: 0.04 K/UL (ref 0–0.11)
IMM GRANULOCYTES NFR BLD AUTO: 0.4 % (ref 0–0.9)
INR PPP: 1.04 (ref 0.87–1.13)
INR PPP: 1.06 (ref 0.87–1.13)
KETONES UR STRIP.AUTO-MCNC: 40 MG/DL
LACTATE BLD-SCNC: 0.6 MMOL/L (ref 0.5–2)
LACTATE BLD-SCNC: 0.8 MMOL/L (ref 0.5–2)
LDLC SERPL CALC-MCNC: 43 MG/DL
LEUKOCYTE ESTERASE UR QL STRIP.AUTO: NEGATIVE
LYMPHOCYTES # BLD AUTO: 2.31 K/UL (ref 1–4.8)
LYMPHOCYTES NFR BLD: 21 % (ref 22–41)
MAGNESIUM SERPL-MCNC: 1.9 MG/DL (ref 1.5–2.5)
MCH RBC QN AUTO: 25.4 PG (ref 27–33)
MCHC RBC AUTO-ENTMCNC: 31.1 G/DL (ref 33.7–35.3)
MCV RBC AUTO: 81.8 FL (ref 81.4–97.8)
MICRO URNS: ABNORMAL
MONOCYTES # BLD AUTO: 0.76 K/UL (ref 0–0.85)
MONOCYTES NFR BLD AUTO: 6.9 % (ref 0–13.4)
NEUTROPHILS # BLD AUTO: 7.67 K/UL (ref 1.82–7.42)
NEUTROPHILS NFR BLD: 69.5 % (ref 44–72)
NITRITE UR QL STRIP.AUTO: NEGATIVE
NRBC # BLD AUTO: 0 K/UL
NRBC BLD-RTO: 0 /100 WBC
NT-PROBNP SERPL IA-MCNC: 558 PG/ML (ref 0–125)
PH UR STRIP.AUTO: 6.5 [PH] (ref 5–8)
PLATELET # BLD AUTO: 156 K/UL (ref 164–446)
PMV BLD AUTO: 10.6 FL (ref 9–12.9)
POTASSIUM SERPL-SCNC: 3.6 MMOL/L (ref 3.6–5.5)
PROCALCITONIN SERPL-MCNC: <0.05 NG/ML
PROT SERPL-MCNC: 6 G/DL (ref 6–8.2)
PROT UR QL STRIP: NEGATIVE MG/DL
PROTHROMBIN TIME: 13.3 SEC (ref 12–14.6)
PROTHROMBIN TIME: 13.5 SEC (ref 12–14.6)
RBC # BLD AUTO: 5 M/UL (ref 4.7–6.1)
RBC UR QL AUTO: NEGATIVE
SODIUM SERPL-SCNC: 143 MMOL/L (ref 135–145)
SP GR UR STRIP.AUTO: 1.04
TRIGL SERPL-MCNC: 135 MG/DL (ref 0–149)
TROPONIN T SERPL-MCNC: 35 NG/L (ref 6–19)
TROPONIN T SERPL-MCNC: 38 NG/L (ref 6–19)
TSH SERPL DL<=0.005 MIU/L-ACNC: 2.61 UIU/ML (ref 0.38–5.33)
UFH PPP CHRO-ACNC: 0.29 IU/ML
UFH PPP CHRO-ACNC: <0.1 IU/ML
UROBILINOGEN UR STRIP.AUTO-MCNC: 0.2 MG/DL
WBC # BLD AUTO: 11 K/UL (ref 4.8–10.8)

## 2022-01-04 PROCEDURE — 700102 HCHG RX REV CODE 250 W/ 637 OVERRIDE(OP): Performed by: INTERNAL MEDICINE

## 2022-01-04 PROCEDURE — 99233 SBSQ HOSP IP/OBS HIGH 50: CPT | Performed by: HOSPITALIST

## 2022-01-04 PROCEDURE — 84443 ASSAY THYROID STIM HORMONE: CPT

## 2022-01-04 PROCEDURE — A9270 NON-COVERED ITEM OR SERVICE: HCPCS | Performed by: HOSPITALIST

## 2022-01-04 PROCEDURE — 700102 HCHG RX REV CODE 250 W/ 637 OVERRIDE(OP)

## 2022-01-04 PROCEDURE — 700102 HCHG RX REV CODE 250 W/ 637 OVERRIDE(OP): Performed by: STUDENT IN AN ORGANIZED HEALTH CARE EDUCATION/TRAINING PROGRAM

## 2022-01-04 PROCEDURE — 36415 COLL VENOUS BLD VENIPUNCTURE: CPT

## 2022-01-04 PROCEDURE — 96375 TX/PRO/DX INJ NEW DRUG ADDON: CPT

## 2022-01-04 PROCEDURE — 99291 CRITICAL CARE FIRST HOUR: CPT | Performed by: INTERNAL MEDICINE

## 2022-01-04 PROCEDURE — 80061 LIPID PANEL: CPT

## 2022-01-04 PROCEDURE — 700111 HCHG RX REV CODE 636 W/ 250 OVERRIDE (IP): Performed by: INTERNAL MEDICINE

## 2022-01-04 PROCEDURE — 85610 PROTHROMBIN TIME: CPT | Mod: 91

## 2022-01-04 PROCEDURE — 96368 THER/DIAG CONCURRENT INF: CPT

## 2022-01-04 PROCEDURE — A9270 NON-COVERED ITEM OR SERVICE: HCPCS | Performed by: INTERNAL MEDICINE

## 2022-01-04 PROCEDURE — 81003 URINALYSIS AUTO W/O SCOPE: CPT

## 2022-01-04 PROCEDURE — 84145 PROCALCITONIN (PCT): CPT

## 2022-01-04 PROCEDURE — 96366 THER/PROPH/DIAG IV INF ADDON: CPT

## 2022-01-04 PROCEDURE — 96367 TX/PROPH/DG ADDL SEQ IV INF: CPT

## 2022-01-04 PROCEDURE — 700105 HCHG RX REV CODE 258: Performed by: STUDENT IN AN ORGANIZED HEALTH CARE EDUCATION/TRAINING PROGRAM

## 2022-01-04 PROCEDURE — 84484 ASSAY OF TROPONIN QUANT: CPT

## 2022-01-04 PROCEDURE — 85379 FIBRIN DEGRADATION QUANT: CPT

## 2022-01-04 PROCEDURE — A9270 NON-COVERED ITEM OR SERVICE: HCPCS | Performed by: EMERGENCY MEDICINE

## 2022-01-04 PROCEDURE — 700102 HCHG RX REV CODE 250 W/ 637 OVERRIDE(OP): Performed by: EMERGENCY MEDICINE

## 2022-01-04 PROCEDURE — 83605 ASSAY OF LACTIC ACID: CPT

## 2022-01-04 PROCEDURE — 99223 1ST HOSP IP/OBS HIGH 75: CPT | Performed by: INTERNAL MEDICINE

## 2022-01-04 PROCEDURE — 83880 ASSAY OF NATRIURETIC PEPTIDE: CPT

## 2022-01-04 PROCEDURE — 93005 ELECTROCARDIOGRAM TRACING: CPT | Performed by: STUDENT IN AN ORGANIZED HEALTH CARE EDUCATION/TRAINING PROGRAM

## 2022-01-04 PROCEDURE — A9270 NON-COVERED ITEM OR SERVICE: HCPCS | Performed by: STUDENT IN AN ORGANIZED HEALTH CARE EDUCATION/TRAINING PROGRAM

## 2022-01-04 PROCEDURE — 80053 COMPREHEN METABOLIC PANEL: CPT

## 2022-01-04 PROCEDURE — 85025 COMPLETE CBC W/AUTO DIFF WBC: CPT

## 2022-01-04 PROCEDURE — 770000 HCHG ROOM/CARE - INTERMEDIATE ICU *

## 2022-01-04 PROCEDURE — 700101 HCHG RX REV CODE 250: Performed by: STUDENT IN AN ORGANIZED HEALTH CARE EDUCATION/TRAINING PROGRAM

## 2022-01-04 PROCEDURE — 83735 ASSAY OF MAGNESIUM: CPT

## 2022-01-04 PROCEDURE — 700102 HCHG RX REV CODE 250 W/ 637 OVERRIDE(OP): Performed by: HOSPITALIST

## 2022-01-04 PROCEDURE — 85520 HEPARIN ASSAY: CPT

## 2022-01-04 PROCEDURE — A9270 NON-COVERED ITEM OR SERVICE: HCPCS

## 2022-01-04 PROCEDURE — 85730 THROMBOPLASTIN TIME PARTIAL: CPT

## 2022-01-04 PROCEDURE — 700111 HCHG RX REV CODE 636 W/ 250 OVERRIDE (IP): Performed by: STUDENT IN AN ORGANIZED HEALTH CARE EDUCATION/TRAINING PROGRAM

## 2022-01-04 RX ORDER — HEPARIN SODIUM 1000 [USP'U]/ML
40 INJECTION, SOLUTION INTRAVENOUS; SUBCUTANEOUS PRN
Status: DISCONTINUED | OUTPATIENT
Start: 2022-01-04 | End: 2022-01-04

## 2022-01-04 RX ORDER — GABAPENTIN 100 MG/1
100 CAPSULE ORAL 4 TIMES DAILY
Status: DISCONTINUED | OUTPATIENT
Start: 2022-01-04 | End: 2022-01-06 | Stop reason: HOSPADM

## 2022-01-04 RX ORDER — ISOSORBIDE MONONITRATE 30 MG/1
60 TABLET, EXTENDED RELEASE ORAL
Status: DISCONTINUED | OUTPATIENT
Start: 2022-01-05 | End: 2022-01-05

## 2022-01-04 RX ORDER — HEPARIN SODIUM 1000 [USP'U]/ML
2000 INJECTION, SOLUTION INTRAVENOUS; SUBCUTANEOUS PRN
Status: DISCONTINUED | OUTPATIENT
Start: 2022-01-04 | End: 2022-01-05

## 2022-01-04 RX ORDER — LORAZEPAM 0.5 MG/1
0.5 TABLET ORAL EVERY 4 HOURS PRN
Status: DISCONTINUED | OUTPATIENT
Start: 2022-01-04 | End: 2022-01-06 | Stop reason: HOSPADM

## 2022-01-04 RX ORDER — POTASSIUM CHLORIDE 20 MEQ/1
40 TABLET, EXTENDED RELEASE ORAL
Status: COMPLETED | OUTPATIENT
Start: 2022-01-04 | End: 2022-01-04

## 2022-01-04 RX ORDER — HEPARIN SODIUM 1000 [USP'U]/ML
4000 INJECTION, SOLUTION INTRAVENOUS; SUBCUTANEOUS ONCE
Status: COMPLETED | OUTPATIENT
Start: 2022-01-04 | End: 2022-01-04

## 2022-01-04 RX ORDER — ASPIRIN 325 MG
325 TABLET ORAL ONCE
Status: COMPLETED | OUTPATIENT
Start: 2022-01-04 | End: 2022-01-04

## 2022-01-04 RX ORDER — HEPARIN SODIUM 5000 [USP'U]/100ML
0-30 INJECTION, SOLUTION INTRAVENOUS CONTINUOUS
Status: DISCONTINUED | OUTPATIENT
Start: 2022-01-04 | End: 2022-01-05

## 2022-01-04 RX ORDER — TAMSULOSIN HYDROCHLORIDE 0.4 MG/1
0.4 CAPSULE ORAL EVERY EVENING
Status: DISCONTINUED | OUTPATIENT
Start: 2022-01-04 | End: 2022-01-06 | Stop reason: HOSPADM

## 2022-01-04 RX ORDER — NITROGLYCERIN 0.4 MG/1
TABLET SUBLINGUAL
Status: COMPLETED
Start: 2022-01-04 | End: 2022-01-04

## 2022-01-04 RX ORDER — NITROGLYCERIN 0.4 MG/1
0.4 TABLET SUBLINGUAL
Status: DISCONTINUED | OUTPATIENT
Start: 2022-01-04 | End: 2022-01-04

## 2022-01-04 RX ORDER — MAGNESIUM SULFATE HEPTAHYDRATE 40 MG/ML
2 INJECTION, SOLUTION INTRAVENOUS ONCE
Status: COMPLETED | OUTPATIENT
Start: 2022-01-04 | End: 2022-01-04

## 2022-01-04 RX ORDER — HEPARIN SODIUM 5000 [USP'U]/100ML
0-30 INJECTION, SOLUTION INTRAVENOUS CONTINUOUS
Status: DISCONTINUED | OUTPATIENT
Start: 2022-01-04 | End: 2022-01-04

## 2022-01-04 RX ORDER — NITROGLYCERIN 20 MG/100ML
0-200 INJECTION INTRAVENOUS CONTINUOUS
Status: DISCONTINUED | OUTPATIENT
Start: 2022-01-04 | End: 2022-01-05

## 2022-01-04 RX ORDER — HEPARIN SODIUM 1000 [USP'U]/ML
80 INJECTION, SOLUTION INTRAVENOUS; SUBCUTANEOUS ONCE
Status: DISCONTINUED | OUTPATIENT
Start: 2022-01-04 | End: 2022-01-04

## 2022-01-04 RX ADMIN — HEPARIN SODIUM 4000 UNITS: 1000 INJECTION, SOLUTION INTRAVENOUS; SUBCUTANEOUS at 12:41

## 2022-01-04 RX ADMIN — NITROGLYCERIN 20 MCG/MIN: 20 INJECTION INTRAVENOUS at 11:16

## 2022-01-04 RX ADMIN — GABAPENTIN 100 MG: 100 CAPSULE ORAL at 12:24

## 2022-01-04 RX ADMIN — NITROGLYCERIN 10 MCG/MIN: 20 INJECTION INTRAVENOUS at 11:34

## 2022-01-04 RX ADMIN — MORPHINE SULFATE 15 MG: 15 TABLET, FILM COATED, EXTENDED RELEASE ORAL at 15:38

## 2022-01-04 RX ADMIN — MORPHINE SULFATE 2 MG: 4 INJECTION INTRAVENOUS at 01:12

## 2022-01-04 RX ADMIN — FAMOTIDINE 20 MG: 20 TABLET ORAL at 17:16

## 2022-01-04 RX ADMIN — LEVOTHYROXINE SODIUM 50 MCG: 0.05 TABLET ORAL at 08:28

## 2022-01-04 RX ADMIN — ISOSORBIDE MONONITRATE 60 MG: 60 TABLET, EXTENDED RELEASE ORAL at 09:02

## 2022-01-04 RX ADMIN — HEPARIN SODIUM 2000 UNITS: 1000 INJECTION, SOLUTION INTRAVENOUS; SUBCUTANEOUS at 19:37

## 2022-01-04 RX ADMIN — SERTRALINE 100 MG: 100 TABLET, FILM COATED ORAL at 17:16

## 2022-01-04 RX ADMIN — METOPROLOL SUCCINATE 50 MG: 25 TABLET, FILM COATED, EXTENDED RELEASE ORAL at 21:00

## 2022-01-04 RX ADMIN — SODIUM CHLORIDE, POTASSIUM CHLORIDE, SODIUM LACTATE AND CALCIUM CHLORIDE: 600; 310; 30; 20 INJECTION, SOLUTION INTRAVENOUS at 00:05

## 2022-01-04 RX ADMIN — HEPARIN SODIUM 12 UNITS/KG/HR: 5000 INJECTION, SOLUTION INTRAVENOUS at 12:42

## 2022-01-04 RX ADMIN — MAGNESIUM SULFATE HEPTAHYDRATE 2 G: 2 INJECTION, SOLUTION INTRAVENOUS at 12:24

## 2022-01-04 RX ADMIN — CLONIDINE HYDROCHLORIDE 0.1 MG: 0.1 TABLET ORAL at 08:29

## 2022-01-04 RX ADMIN — POTASSIUM CHLORIDE 40 MEQ: 1500 TABLET, EXTENDED RELEASE ORAL at 15:40

## 2022-01-04 RX ADMIN — LABETALOL HYDROCHLORIDE 10 MG: 5 INJECTION, SOLUTION INTRAVENOUS at 00:09

## 2022-01-04 RX ADMIN — ASPIRIN 325 MG ORAL TABLET 325 MG: 325 PILL ORAL at 11:20

## 2022-01-04 RX ADMIN — MORPHINE SULFATE 15 MG: 15 TABLET, FILM COATED, EXTENDED RELEASE ORAL at 19:19

## 2022-01-04 RX ADMIN — POTASSIUM CHLORIDE 40 MEQ: 1500 TABLET, EXTENDED RELEASE ORAL at 12:24

## 2022-01-04 RX ADMIN — GABAPENTIN 100 MG: 100 CAPSULE ORAL at 17:16

## 2022-01-04 RX ADMIN — LORAZEPAM 0.5 MG: 0.5 TABLET ORAL at 10:35

## 2022-01-04 RX ADMIN — NITROGLYCERIN 0.4 MG: 0.4 TABLET, ORALLY DISINTEGRATING SUBLINGUAL at 11:15

## 2022-01-04 RX ADMIN — ATORVASTATIN CALCIUM 40 MG: 40 TABLET, FILM COATED ORAL at 09:02

## 2022-01-04 RX ADMIN — GABAPENTIN 100 MG: 100 CAPSULE ORAL at 21:00

## 2022-01-04 RX ADMIN — LORAZEPAM 0.5 MG: 0.5 TABLET ORAL at 19:19

## 2022-01-04 RX ADMIN — CLOPIDOGREL BISULFATE 75 MG: 75 TABLET ORAL at 08:29

## 2022-01-04 RX ADMIN — TAMSULOSIN HYDROCHLORIDE 0.4 MG: 0.4 CAPSULE ORAL at 17:16

## 2022-01-04 RX ADMIN — MORPHINE SULFATE 15 MG: 15 TABLET, FILM COATED, EXTENDED RELEASE ORAL at 08:29

## 2022-01-04 RX ADMIN — LISINOPRIL 40 MG: 20 TABLET ORAL at 08:29

## 2022-01-04 ASSESSMENT — LIFESTYLE VARIABLES
EVER FELT BAD OR GUILTY ABOUT YOUR DRINKING: NO
TOTAL SCORE: 0
ON A TYPICAL DAY WHEN YOU DRINK ALCOHOL HOW MANY DRINKS DO YOU HAVE: 0
ALCOHOL_USE: NO
HOW MANY TIMES IN THE PAST YEAR HAVE YOU HAD 5 OR MORE DRINKS IN A DAY: 0
AVERAGE NUMBER OF DAYS PER WEEK YOU HAVE A DRINK CONTAINING ALCOHOL: 0
HAVE PEOPLE ANNOYED YOU BY CRITICIZING YOUR DRINKING: NO
HAVE YOU EVER FELT YOU SHOULD CUT DOWN ON YOUR DRINKING: NO
EVER HAD A DRINK FIRST THING IN THE MORNING TO STEADY YOUR NERVES TO GET RID OF A HANGOVER: NO
CONSUMPTION TOTAL: NEGATIVE

## 2022-01-04 ASSESSMENT — ENCOUNTER SYMPTOMS
FEVER: 0
HEMOPTYSIS: 0
SEIZURES: 0
SORE THROAT: 0
ORTHOPNEA: 0
HEADACHES: 0
PALPITATIONS: 0
SINUS PAIN: 0
MYALGIAS: 0
EYE PAIN: 0
BRUISES/BLEEDS EASILY: 0
DIARRHEA: 0
NECK PAIN: 0
VOMITING: 0
EYE DISCHARGE: 0
LOSS OF CONSCIOUSNESS: 0
HALLUCINATIONS: 0
ABDOMINAL PAIN: 0
SPUTUM PRODUCTION: 0
CHILLS: 0
EYE REDNESS: 0
NERVOUS/ANXIOUS: 1

## 2022-01-04 ASSESSMENT — PAIN DESCRIPTION - PAIN TYPE
TYPE: CHRONIC PAIN
TYPE: ACUTE PAIN
TYPE: CHRONIC PAIN

## 2022-01-04 ASSESSMENT — FIBROSIS 4 INDEX: FIB4 SCORE: 2.03

## 2022-01-04 NOTE — ASSESSMENT & PLAN NOTE
Sepsis is ruled out  While leukocytosis is present, he has no evidence clinically of an acute infectious process  Blood cultures are negative so far  Procalcitonin is normal, for what it's worth  UA unremarkable  Stop ceftriaxone and observe off antibiotics

## 2022-01-04 NOTE — PROGRESS NOTES
11:13Called to patients room by Dr. Sanchez for increased chest pain 7/10, verbal order for sublingual nitro as we were waiting for continuous drip from pharmacy.     11:16 received continuous drip from pharmacy, administered. Chest pain 7/10    11:34 titrated to 10mcg/min chest pain 4/10 /87 HR 70

## 2022-01-04 NOTE — PROGRESS NOTES
Bedside report received. Assumed care of patient this morning. Assessment completed      Patient is A&O x 4, pt calls for assistance appropriately  Reports 1 /10 chest pain 5/10 back pain , prn medication administered.  Pt is on 2L oxygen for sleep, baseline is ra.   Mobility SBA    Voiding +  Flatus +    Plan of care reviewed with the patient. Bed is locked and in the lowest position. Call light is within reach. Patient encouraged to voice needs and concerns, all needs met at this time. Hourly rounding in place.

## 2022-01-04 NOTE — ED NOTES
Assumed pt care. Pt resting on gurney. Reporting pain is 4/10. Additional IV established. Blood work drawn and sent

## 2022-01-04 NOTE — H&P
Hospital Medicine History & Physical Note    Date of Service  1/3/2022    Primary Care Physician  Suman Hope D.O.    Consultants      Code Status  Prior    Chief Complaint  Chief Complaint   Patient presents with   • Chest Pain     Pt reports awakening this morning with mid-left 9/10 chest pain that does not radiate. Pt reports being diaphoretic. Pt has a cardiac history of stents and a medtronic pacemaker placed and a double bypass surgery.        History of Presenting Illness  Antony Akbar Sr. is a 66 y.o. male with past medical history of anxiety, ACS with multiple stents and pacemaker, s/p double bypass surgery, hypertension, hypothyroidism, COPD who presented 1/3/2022 with 24-hour history of chest pain located midsternally that does not radiate, with associated nausea and diaphoresis.    Notable findings include: Troponin 20, WBC 18.5, blood pressure 193/95, heart rate 96    I discussed the plan of care with patient.    Review of Systems  Review of Systems   Respiratory: Positive for shortness of breath.    Cardiovascular: Positive for chest pain.   Gastrointestinal: Positive for nausea.   Psychiatric/Behavioral: The patient is nervous/anxious.    All other systems reviewed and are negative.      Past Medical History   has a past medical history of Asthma, Back pain, BPH (benign prostatic hyperplasia), Chronic obstructive pulmonary disease (HCC), Hypertension, and Hypothyroid.    Surgical History   has a past surgical history that includes cardiac cath, right/left heart and pr open fix inter/subtroch fx,implnt (Left, 8/4/2021).     Family History  family history is not on file.   Family history reviewed with patient. There is no family history that is pertinent to the chief complaint.     Social History   reports that he has quit smoking. He has never used smokeless tobacco. He reports previous alcohol use. He reports previous drug use.    Allergies  Allergies   Allergen Reactions   • Bee Venom  Anaphylaxis       Medications  Prior to Admission Medications   Prescriptions Last Dose Informant Patient Reported? Taking?   EPINEPHrine (EPIPEN 2-VEL) 0.3 MG/0.3ML Solution Auto-injector solution for injection PRN at PRN Patient Yes No   Sig: Inject 0.3 mg into the shoulder, thigh, or buttocks as needed.   albuterol 108 (90 Base) MCG/ACT Aero Soln inhalation aerosol 1/2/2022 at AM Patient Yes No   Sig: Inhale 2 Puffs every 6 hours as needed for Shortness of Breath.   atorvastatin (LIPITOR) 40 MG Tab 1/3/2022 at AM Patient No No   Sig: Take 1 Tablet by mouth every day.   cloNIDine (CATAPRES) 0.1 MG Tab 1/3/2022 at AM Patient No No   Sig: Take 1 Tablet by mouth every morning.   clopidogrel (PLAVIX) 75 MG Tab 1/3/2022 at AM Patient No No   Sig: Take 1 Tablet by mouth every morning.   cyclobenzaprine (FLEXERIL) 10 mg Tab 1/3/2022 at AM Patient No No   Sig: Take 1 Tablet by mouth 3 times a day as needed for Moderate Pain.   famotidine (PEPCID) 40 MG Tab 1/3/2022 at AM Patient No No   Sig: Take 1 Tablet by mouth every evening.   gabapentin (NEURONTIN) 100 MG Cap 1/3/2022 at AM Patient Yes No   Sig: Take 100 mg by mouth 4 times a day.   isosorbide mononitrate SR (IMDUR) 60 MG TABLET SR 24 HR 1/3/2022 at AM Patient No No   Sig: Take 1 Tablet by mouth 2 times a day.   levothyroxine (SYNTHROID) 50 MCG Tab 1/3/2022 at AM Patient Yes Yes   Sig: Take 50 mcg by mouth every morning on an empty stomach.   lisinopril (PRINIVIL) 40 MG tablet 1/3/2022 at AM Patient No No   Sig: Take 1 Tablet by mouth every day.   metoprolol SR (TOPROL XL) 50 MG TABLET SR 24 HR 1/3/2022 at AM Patient No No   Sig: Take 1 Tablet by mouth 2 (two) times a day.   morphine ER (MS CONTIN) 15 MG Tab CR tablet 1/3/2022 at AM Patient Yes Yes   Sig: Take 15 mg by mouth 3 times a day as needed (Pain).   nitroglycerin (NITROSTAT) 0.4 MG SL Tab PRN at PRN Patient No No   Sig: Place 1 Tablet under the tongue 1 time a day as needed for Chest Pain (chest pain).    sertraline (ZOLOFT) 100 MG Tab 1/2/2022 at PM Patient Yes No   Sig: Take 100 mg by mouth every evening.   tamsulosin (FLOMAX) 0.4 MG capsule 1/2/2022 at PM Patient No No   Sig: Take 1 Capsule by mouth every evening.   vitamin D3 (CHOLECALCIFEROL) 1000 Unit (25 mcg) Tab 1/3/2022 at AM Patient Yes No   Sig: Take 1,000 Units by mouth every day.      Facility-Administered Medications: None       Physical Exam  Temp:  [36.7 °C (98 °F)-36.7 °C (98.1 °F)] 36.7 °C (98 °F)  Pulse:  [] 78  Resp:  [15-41] 15  BP: (138-193)/() 177/96  SpO2:  [90 %-98 %] 97 %  Blood Pressure: (!) 177/96   Temperature: 36.7 °C (98 °F)   Pulse: 78   Respiration: 15   Pulse Oximetry: 97 %       Physical Exam     Constitutional: Resting comfortably in NAD   HENT: Normocephalic, no obvious evidence of acute trauma.  Eyes: No scleral icterus. Normal conjunctiva   Neck: Comfortable movement without any obvious restriction in the range of motion.  Cardiovascular: tachycardia  Thorax & Lungs: No respiratory distress. No wheezing, rales or rhonchi heard on ausculation.  there is no obvious chest wall tenderness. I appreciate normal air movement throughout.  Reproducible chest pain throughout bilateral upper thorax  Abdomen: The abdomen is not visibly distended. Upon palpation, I find it to be without tenderness.  No mass appreciated.  Skin: The exposed portions of skin reveal no obvious rash or other abnormalities.  Extremities/Musculoskeletal: no lower extremity edema with no asymmetry.  Neurologic: Alert & oriented. No focal deficits observed.   Psychiatric:  Patient appears anxious  .  Laboratory:  Recent Labs     01/03/22  1155   WBC 18.4*   RBC 5.91   HEMOGLOBIN 15.3   HEMATOCRIT 47.6   MCV 80.5*   MCH 25.9*   MCHC 32.1*   RDW 50.5*   PLATELETCT 218   MPV 10.9     Recent Labs     01/03/22  1155   SODIUM 143   POTASSIUM 3.6   CHLORIDE 105   CO2 21   GLUCOSE 76   BUN 10   CREATININE 0.84   CALCIUM 9.5     Recent Labs     01/03/22  1155    ALTSGPT 9   ASTSGOT 14   ALKPHOSPHAT 140*   TBILIRUBIN 0.9   GLUCOSE 76         No results for input(s): NTPROBNP in the last 72 hours.      Recent Labs     01/03/22  1155   TROPONINT 20*       Imaging:  CT-CTA CHEST PULMONARY ARTERY W/ RECONS   Final Result      1.  No CT evidence of pulmonary embolism.      2.  Cholelithiasis.      3.  Calcific atherosclerosis.      4.  1 cm mass within each adrenal gland. These could represent adrenal adenomas. Follow-up noncontrast CT of the adrenal glands could be obtained for further evaluation if clinically indicated.            DX-CHEST-PORTABLE (1 VIEW)   Final Result   Addendum 1 of 1   Signed      Final      1.  No acute cardiac or pulmonary abnormalities are identified.              Assessment/Plan:  I anticipate this patient will require at least two midnights for appropriate medical management, necessitating inpatient admission.    Sepsis (HCC)- (present on admission)  Assessment & Plan  This is Sepsis Present on admission  SIRS criteria identified on my evaluation include: Leukocytosis, with WBC greater than 12,000  Source is unk  Sepsis protocol initiated  Fluid resuscitation ordered per protocol  IV antibiotics as appropriate for source of sepsis  While organ dysfunction may be noted elsewhere in this problem list or in the chart, degree of organ dysfunction does not meet CMS criteria for severe sepsis          Anxiety- (present on admission)  Assessment & Plan  Continue home meds  Ativan prn    Hypertensive urgency- (present on admission)  Assessment & Plan  Telemetry  Antihypertensives prn  Continue to monitor    Angina pectoris (HCC)- (present on admission)  Assessment & Plan  - tele monitor  - Stress test  - hold BB therapy  - f/u A1c and lipid panel  - TTE  - analgesics for symptom management    Essential hypertension- (present on admission)  Assessment & Plan  Admitted with telemetry  Continue home meds      VTE prophylaxis: SCDs/TEDs

## 2022-01-04 NOTE — ED NOTES
Report taken from Ann ARREDONDO. Patient in [facility]. A&Ox4, equal strength bilaterally. SPO2 stable on RA. NSR in 80s, chest pain at 3/10, declines pain medication.

## 2022-01-04 NOTE — ASSESSMENT & PLAN NOTE
Known history of CAD with prior PCI and two-vessel CABG in 2012  I am titrating a nitroglycerin drip to eliminate angina  Continue isosorbide mononitrate, 60 mg twice daily  Continue metoprolol succinate, 50 mg twice daily  Continue aspirin and Plavix  Continue full anticoagulation with heparin with anti-Xa monitoring  Cardiology is following

## 2022-01-04 NOTE — ASSESSMENT & PLAN NOTE
With hypertensive urgency initially  Optimize medical therapy in conjunction with antianginals  Continue Imdur lisinopril metoprolol and clonidine  Monitor blood pressure adjust accordingly

## 2022-01-04 NOTE — ED NOTES
Break RN: [facility] [place] [facility] [place]

## 2022-01-04 NOTE — NON-PROVIDER
Medical Student Note    CARDIOLOGY CONSULTATION NOTE      Date of Consultation: 1/4/2022  Consulting Provider: Dr. Bertin Mcdaniels    Patient Name: Antony Akbar  YOB: 1955  MRN: [id number]    Reason for Consultation:   Chest Pain    History of Present Illness:   Antony Akbar Sr. is a 66 year-old man with a past medical history of CAD with multiple stents, pacemaker placement in 2008, 2 vessel bypass in 2012 with SVG unclear if LIMA lalo, HTN, HLD, and COPD. He presented to the ED yesterday with a stabbing 7/10 chest pain with radiation down his left arm and between his shoulder blades. He has had a similar pain for the past 6 months but recently it has become more frequent and more intense. He reports no provacative factors but states his nitro at home helps with the pain. He states the pain usually resolves within 30 minutes of onset. Before arrival to the ED he was at [facility] for a stress test due to his frequent chest pain but nuclear medicine sent him to the ED to be evaluated. He endorses nausea, palpitations, SOB,but denies edema, cough, fever.        Medical History:     Past Medical History:   Diagnosis Date   • Asthma    • Back pain    • BPH (benign prostatic hyperplasia)    • Chronic obstructive pulmonary disease (HCC)    • Hypertension    • Hypothyroid        Surgical History:     Past Surgical History:   Procedure Laterality Date   • [facility] OPEN FIX INTER/SUBTROCH FX,IMPLNT Left 8/4/2021    Procedure: INSERTION, INTRAMEDULLARY MIKAEL, FEMUR, PROXIMAL;  Surgeon: Valentin Ríos M.D.;  Location: SURGERY [facility];  Service: Orthopedics   • CARDIAC CATH, RIGHT/LEFT HEART         Family History:   No family history on file.    Social History:    reports that he has quit smoking. He has never used smokeless tobacco. He reports previous alcohol use. He reports previous drug use.  The patient is a former smoker    Medications and Allergies:     Current Facility-Administered  Medications   Medication Dose Route Frequency Provider Last Rate Last Admin   • gabapentin (NEURONTIN) capsule 100 mg  100 mg Oral 4X/DAY Bertin Mcdaniels M.D.       • tamsulosin (FLOMAX) capsule 0.4 mg  0.4 mg Oral Q EVENING Bertin Mcdaniels M.D.       • LORazepam (ATIVAN) tablet 0.5 mg  0.5 mg Oral Q4HRS PRN Bertin Mcdaniels M.D.   0.5 mg at 01/04/22 1035   • [START ON 1/5/2022] aspirin EC (ECOTRIN) tablet 81 mg  81 mg Oral DAILY French Herrera M.D.       • nitroglycerin 50 mg in D5W 250 ml infusion  0-200 mcg/min Intravenous Continuous French Herrera M.D. 6 mL/hr at 01/04/22 1116 20 mcg/min at 01/04/22 1116   • morphine ER (MS CONTIN) tablet 15 mg  15 mg Oral TID PRN Monique Mares M.D.   15 mg at 01/04/22 0829   • labetalol (NORMODYNE/TRANDATE) injection 10 mg  10 mg Intravenous Once Milagros Belle M.D.       • atorvastatin (LIPITOR) tablet 40 mg  40 mg Oral DAILY Milagros Belle M.D.   40 mg at 01/04/22 0902   • cloNIDine (CATAPRES) tablet 0.1 mg  0.1 mg Oral QAM Milagros Belle M.D.   0.1 mg at 01/04/22 0829   • albuterol inhaler 2 Puff  2 Puff Inhalation Q6HRS PRN (RT) Milagros Belle M.D.       • clopidogrel (PLAVIX) tablet 75 mg  75 mg Oral QAM Milagros Belle M.D.   75 mg at 01/04/22 0829   • metoprolol SR (TOPROL XL) tablet 50 mg  50 mg Oral BID Milagros Belle M.D.   50 mg at 01/03/22 2304   • lisinopril (PRINIVIL) tablet 40 mg  40 mg Oral DAILY Milagros Belle M.D.   40 mg at 01/04/22 0829   • sertraline (Zoloft) tablet 100 mg  100 mg Oral Q EVENING Milagros Belle M.D.   100 mg at 01/03/22 2307   • isosorbide mononitrate SR (IMDUR) tablet 60 mg  60 mg Oral BID Milagros Belle M.D.   60 mg at 01/04/22 0902   • famotidine (PEPCID) tablet 20 mg  20 mg Oral Q EVENING Milagros Belle M.D.   20 mg at 01/03/22 2304   • cyclobenzaprine (Flexeril) tablet 10 mg  10 mg Oral TID PRN Milagros Belle M.D.       • levothyroxine (SYNTHROID) tablet 50 mcg  50 mcg Oral AM ES Milagros MCCORMICK  BJORN Belle   50 mcg at 01/04/22 0828   • senna-docusate (PERICOLACE or SENOKOT S) 8.6-50 MG per tablet 2 Tablet  2 Tablet Oral BID Milagros Belle M.D.        And   • polyethylene glycol/lytes (MIRALAX) PACKET 1 Packet  1 Packet Oral QDAY PRN Milagros Belle M.D.        And   • magnesium hydroxide (MILK OF MAGNESIA) suspension 30 mL  30 mL Oral QDAY PRN Milagros Belle M.D.        And   • bisacodyl (DULCOLAX) suppository 10 mg  10 mg Rectal QDAY PRN Milagros Belle M.D.       • regadenoson (LEXISCAN) injection SOLN 0.4 mg  0.4 mg Intravenous Once PRN Milagros Belle M.D.       • aminophylline injection 100 mg  100 mg Intravenous Q5 MIN PRN Milagros Belle M.D.       • acetaminophen (Tylenol) tablet 650 mg  650 mg Oral Q6HRS PRN Milagros Belle M.D.       • cefTRIAXone (Rocephin) 2 g in  mL IVPB  2 g Intravenous Q24HR Milagros Belle M.D.   Stopped at 01/03/22 2335   • labetalol (NORMODYNE/TRANDATE) injection 10 mg  10 mg Intravenous Q4HRS PRN Milagros Belle M.D.   10 mg at 01/04/22 0009   • ondansetron (ZOFRAN) syringe/vial injection 4 mg  4 mg Intravenous Q4HRS PRN Milagros Belle M.D.       • ondansetron (ZOFRAN ODT) dispertab 4 mg  4 mg Oral Q4HRS PRN Milagros Belle M.D.       • promethazine (PHENERGAN) tablet 12.5-25 mg  12.5-25 mg Oral Q4HRS PRCHENTE Belle M.D.       • promethazine (PHENERGAN) suppository 12.5-25 mg  12.5-25 mg Rectal Q4HRS PRN Milagros Belle M.D.       • prochlorperazine (COMPAZINE) injection 5-10 mg  5-10 mg Intravenous Q4HRS PRN Milagros Belle M.D.       • mag hydrox-al hydrox-simeth (MAALOX PLUS ES or MYLANTA DS) suspension 30 mL  30 mL Oral Q4HRS PRN Milagros Belle M.D.       • guaiFENesin dextromethorphan (ROBITUSSIN DM) 100-10 MG/5ML syrup 10 mL  10 mL Oral Q6HRS PRN Milagros Belle M.D.       • nicotine (NICODERM) 14 MG/24HR 14 mg  14 mg Transdermal Daily-0600 Milagros Belle M.D.        And   • nicotine polacrilex (NICORETTE) 2 MG  piece 2 mg  2 mg Oral Q HOUR PRN Milagros Belle M.D.       • morphine 4 MG/ML injection 2 mg  2 mg Intravenous Q3HRS PRN Milagros Belle M.D.   2 mg at 01/04/22 0112     Current Outpatient Medications   Medication Sig Dispense Refill   • levothyroxine (SYNTHROID) 50 MCG Tab Take 50 mcg by mouth every morning on an empty stomach.     • morphine ER (MS CONTIN) 15 MG Tab CR tablet Take 15 mg by mouth 3 times a day as needed (Pain).     • vitamin D3 (CHOLECALCIFEROL) 1000 Unit (25 mcg) Tab Take 1,000 Units by mouth every day.     • metoprolol SR (TOPROL XL) 50 MG TABLET SR 24 HR Take 1 Tablet by mouth 2 (two) times a day. 60 Tablet 3   • nitroglycerin (NITROSTAT) 0.4 MG SL Tab Place 1 Tablet under the tongue 1 time a day as needed for Chest Pain (chest pain). 25 Tablet 2   • atorvastatin (LIPITOR) 40 MG Tab Take 1 Tablet by mouth every day. 90 Tablet 2   • cloNIDine (CATAPRES) 0.1 MG Tab Take 1 Tablet by mouth every morning. 90 Tablet 2   • clopidogrel (PLAVIX) 75 MG Tab Take 1 Tablet by mouth every morning. 90 Tablet 2   • cyclobenzaprine (FLEXERIL) 10 mg Tab Take 1 Tablet by mouth 3 times a day as needed for Moderate Pain. 30 Tablet 0   • famotidine (PEPCID) 40 MG Tab Take 1 Tablet by mouth every evening. 90 Tablet 2   • isosorbide mononitrate SR (IMDUR) 60 MG TABLET SR 24 HR Take 1 Tablet by mouth 2 times a day. 180 Tablet 2   • lisinopril (PRINIVIL) 40 MG tablet Take 1 Tablet by mouth every day. 90 Tablet 2   • tamsulosin (FLOMAX) 0.4 MG capsule Take 1 Capsule by mouth every evening. 90 Capsule 2   • gabapentin (NEURONTIN) 100 MG Cap Take 100 mg by mouth 4 times a day.     • sertraline (ZOLOFT) 100 MG Tab Take 100 mg by mouth every evening.     • EPINEPHrine (EPIPEN 2-VEL) 0.3 MG/0.3ML Solution Auto-injector solution for injection Inject 0.3 mg into the shoulder, thigh, or buttocks as needed.     • albuterol 108 (90 Base) MCG/ACT Aero Soln inhalation aerosol Inhale 2 Puffs every 6 hours as needed for Shortness  "of Breath.         Allergies   Allergen Reactions   • Bee Venom Anaphylaxis       Review of Systems:   A pertinent review of systems was performed and was unremarkable except as per HPI above.    Vital Signs:   /86   Pulse 76   Temp 36.7 °C (98 °F) (Temporal)   Resp 16   Ht 1.702 m (5' 7\")   Wt 86.2 kg (190 lb)   SpO2 98%   BMI 29.76 kg/m²   Vitals:    01/04/22 0000 01/04/22 0100 01/04/22 0130 01/04/22 0200   BP: 148/95 (!) 181/87 (!) 177/85 154/86   Pulse: 88 69 75 76   Resp: (!) 39 13 (!) 23 16   Temp:       TempSrc:       SpO2: 92% 90% 97% 98%   Weight:       Height:         Body mass index is 29.76 kg/m².  Oxygen Therapy:  Pulse Oximetry: 98 %, O2 (LPM): 2, O2 Delivery Device: Silicone Nasal Cannula  Physical Exam    Physical Examination:     General: Well-appearing. Mild distress.  HEENT: EOM grossly intact, no scleral icterus, no pharyngeal erythema. no ear lob crease.   Neck:  No JVD, no bruits, trachea midline  Cardiovascular: Regular rate and rhythm. Normal S1, S2. no M/R/G. no LE edema.  2+ radial pulses, 2+ PT pulses  Pulmonary: CTAB. mild wheezing in lower lobes with no crackles/rhonchi. Normal respiratory effort.  Abdomen: Soft, NT, no brian hepatomegaly.  MSK/Ext: Mild clubbing with no cyanosis.  Skin: Warm and diaphoretic, no rashes.  Neurological: CN III-XII grossly intact. No gross focal motor deficits.   Psych: A&O x 3, appropriate affect.    Laboratories:   Estimated Creatinine Clearance: 90.9 mL/min (by C-G formula based on SCr of 0.86 mg/dL).  Recent Labs     01/03/22  1155 01/04/22  0305   CREATININE 0.84 0.86   BUN 10 8   POTASSIUM 3.6 3.6   SODIUM 143 143   CALCIUM 9.5 8.6   MAGNESIUM  --  1.9   CO2 21 22   ALBUMIN 5.1* 3.8     Recent Labs     01/03/22  1155 01/04/22  0305   GLUCOSE 76 93     Recent Labs     01/03/22  1155 01/04/22  0305   ASTSGOT 14 14   ALTSGPT 9 8   ALKPHOSPHAT 140* 110*   INR  --  1.06     Recent Labs     01/03/22  1155 01/04/22  0305   WBC 18.4* 11.0* "   HEMOGLOBIN 15.3 12.7*   PLATELETCT 218 156*     Recent Labs     01/03/22  1155 01/04/22  0305 01/04/22  0739   TROPONINT 20* 35* 38*   NTPROBNP  --  558*  --      Lab Results   Component Value Date/Time    LDL 43 01/04/2022 0305     Lab Results   Component Value Date/Time    HDL 33 (A) 01/04/2022 0305       Lab Results   Component Value Date/Time    TRIGLYCERIDE 135 01/04/2022 0305       Lab Results   Component Value Date/Time    CHOLSTRLTOT 103 01/04/2022 0305       Studies:   Echocardiography  None    Coronary Angiography/Cardiac Catheterization  None    Stress Testing  None    X-ray/CT/MRI    CXR:  Left chest cardiac device.  HEART: Not enlarged. There is atherosclerotic calcification in the aortic arch.  LUNGS: No areas of air space disease are demonstrated.  PLEURA: No effusion or pneumothorax.    IMPRESSION:   1.  No acute cardiac or pulmonary abnormalities are identified.    CT-CTA Chest  IMPRESSION  1.  No CT evidence of pulmonary embolism  2.  Cholelithiasis.  3.  Calcific atherosclerosis.  4.  1 cm mass within each adrenal gland. These could represent adrenal adenomas. Follow-up noncontrast CT of the adrenal glands could be obtained for further evaluation if clinically indicated.    Ultrasound  None    Electrophysiology    Regular rate and rhythm, normal axis, atrial paced, incomplete right bundle branch block, prolonged QT, no ST elevations      Assessment and Recommendations:   Antony Akbar is a 67 y/o male with an extensive cardiac history with CAD, multiple stents, 2 vessel cardiac bypass, pacemaker placement, and HTN who's presentation of chest pain at rest which is increasing in frequency and relived with nitro and mildly elevated troponin is most concerning for a NSTEMI.     Recommendations    #Chest pain  #NSTEMI    -Restart Metoprolol  -Start Heparin drip  -Start Nitro drip  -start Asprin   -continue Clopidogrel  -continue Atorvastatin  -Change Isosorbide mononitrate to once a day    -Trend  troponin  -EKG with worsening symptoms    -Plan for angiogram tomorrow given that chest pain is controlled.     #Hypertensive urgency (/93 upon evaluation)    -Metoprolol as above  -Nitro drip as above  -Continue clonidine   - Continue lisinopril  -Goal to reduce systolic BP to 160 in first 6hrs of nitro drip max 25% reduction of SBP  Within first hour, then with 24 hrs can continue to reduce to 140.        Cardiology will continue to follow along.      Thank you for allowing me to participate in the care of this patient.           Zafar Machuca- MS4

## 2022-01-04 NOTE — PROGRESS NOTES
Interim summary:    Contacted by RN for elevated troponin.  It seems patient was admitted for angina pectoris and sepsis of unknown origin however does not meet septic criteria and no source of infection per chart review.  Continuous fluids in place without VIJAYA or elevation of BUN.  No indication for Rocephin at this time and will be discontinued.  Patient has stress test ordered in a.m. and I will place orders to hold patient's beta-blockade.  No indications for continuous fluids at this time which will be discontinued.  Nitroglycerin added to medication regiment for pain control. Repeat trop and EKG ordered.     Electronically Signed:    Elvis Samuel DO

## 2022-01-04 NOTE — PROGRESS NOTES
[facility] Medicine Daily Progress Note    Date of Service  1/4/2022    Chief Complaint  Antony Akbar Sr. is a 66 y.o. male admitted 1/3/2022 with chest pain    Hospital Course  66-year-old male with history of severe CAD with prior history of CABG in 2012 and multiple PCI with 6 stents and current pacemaker placement in [place] presented with recurrent episodes of chest pain. He has been having recurrent episodes of chest pain over the past several weeks however lately her symptoms have been more severe and not relieved by sublingual nitroglycerin.    Interval Problem Update    Patient complains of chest pain moderate sharp midsternal nonradiating  He is very anxious  Denies dyspnea    I have personally seen and examined the patient at bedside. I discussed the plan of care with patient, bedside RN and cardiology.    Consultants/Specialty  cardiology    Code Status  Full Code    Disposition  Patient is not medically cleared for discharge.   Anticipate discharge to to home with close outpatient follow-up.  I have placed the appropriate orders for post-discharge needs.    Review of Systems  Review of Systems   Constitutional: Negative for fever.   Cardiovascular: Positive for chest pain. Negative for palpitations and orthopnea.   Psychiatric/Behavioral: The patient is nervous/anxious.    All other systems reviewed and are negative.       Physical Exam  Temp:  [36.7 °C (98 °F)] 36.7 °C (98 °F)  Pulse:  [] 70  Resp:  [10-41] 19  BP: (138-195)/() 159/92  SpO2:  [87 %-98 %] 92 %    Physical Exam  Vitals and nursing note reviewed.   Constitutional:       Appearance: He is well-developed. He is not diaphoretic.   HENT:      Head: Normocephalic and atraumatic.      Mouth/Throat:      Pharynx: No oropharyngeal exudate.   Eyes:      General: No scleral icterus.        Right eye: No discharge.         Left eye: No discharge.      Conjunctiva/sclera: Conjunctivae normal.      Pupils: Pupils are equal, round, and  reactive to light.   Neck:      Vascular: No JVD.      Trachea: No tracheal deviation.   Cardiovascular:      Rate and Rhythm: Normal rate and regular rhythm.      Heart sounds: No murmur heard.  No friction rub. No gallop.    Pulmonary:      Effort: Pulmonary effort is normal. No respiratory distress.      Breath sounds: Normal breath sounds. No stridor. No wheezing.   Chest:      Chest wall: No tenderness.   Abdominal:      General: Bowel sounds are normal. There is no distension.      Palpations: Abdomen is soft.      Tenderness: There is no abdominal tenderness. There is no rebound.   Musculoskeletal:         General: No tenderness.      Cervical back: Neck supple.   Skin:     General: Skin is warm and dry.      Nails: There is no clubbing.   Neurological:      Mental Status: He is alert and oriented to person, place, and time.      Cranial Nerves: No cranial nerve deficit.      Motor: No abnormal muscle tone.   Psychiatric:         Mood and Affect: Mood is anxious.         Fluids  No intake or output data in the 24 hours ending 01/04/22 1327    Laboratory  Recent Labs     01/03/22  1155 01/04/22  0305   WBC 18.4* 11.0*   RBC 5.91 5.00   HEMOGLOBIN 15.3 12.7*   HEMATOCRIT 47.6 40.9*   MCV 80.5* 81.8   MCH 25.9* 25.4*   MCHC 32.1* 31.1*   RDW 50.5* 52.9*   PLATELETCT 218 156*   MPV 10.9 10.6     Recent Labs     01/03/22  1155 01/04/22  0305   SODIUM 143 143   POTASSIUM 3.6 3.6   CHLORIDE 105 107   CO2 21 22   GLUCOSE 76 93   BUN 10 8   CREATININE 0.84 0.86   CALCIUM 9.5 8.6     Recent Labs     01/04/22  0305 01/04/22  1217   APTT  --  26.9   INR 1.06 1.04         Recent Labs     01/04/22  0305   TRIGLYCERIDE 135   HDL 33*   LDL 43       Imaging  CT-CTA CHEST PULMONARY ARTERY W/ RECONS   Final Result      1.  No CT evidence of pulmonary embolism.      2.  Cholelithiasis.      3.  Calcific atherosclerosis.      4.  1 cm mass within each adrenal gland. These could represent adrenal adenomas. Follow-up noncontrast CT  of the adrenal glands could be obtained for further evaluation if clinically indicated.            DX-CHEST-PORTABLE (1 VIEW)   Final Result   Addendum 1 of 1   Signed      Final      1.  No acute cardiac or pulmonary abnormalities are identified.      NM-CARDIAC STRESS TEST    (Results Pending)        Assessment/Plan  * Unstable angina (HCC)- (present on admission)  Assessment & Plan  Discussed with cardiology patient will be started on heparin drip and nitro drip to be titrated until chest pain-free  Continue Plavix metoprolol lisinopril and atorvastatin  Cardiology planning  Angiogram in am  CTA reviewed was negative for PE    Electrolyte abnormality  Assessment & Plan  Replete potassium and magnesium and monitor levels    Adrenal mass (HCC)  Assessment & Plan  Will need outpatient follow-up    HLD (hyperlipidemia)  Assessment & Plan  Continue atorvastatin    Anxiety- (present on admission)  Assessment & Plan  Continue Zoloft  Add lorazepam as needed    Hypertensive urgency- (present on admission)  Assessment & Plan  Patient being started on nitro drip for ACS  Monitor blood pressure  Continue current medical therapy    Sepsis (HCC)- (present on admission)  Assessment & Plan  Ruled out likely  SIRS secondary to NSTEMI     patient has no clear source of infection and clinically does not appear to be septic his leukocytosis is likely reactive  Will DC antibiotics and monitor          Primary hypertension- (present on admission)  Assessment & Plan  With hypertensive urgency  Patient being started on nitroglycerin drip  Continue Imdur lisinopril metoprolol and clonidine  Monitor blood pressure adjust accordingly    COPD (chronic obstructive pulmonary disease) (HCC)- (present on admission)  Assessment & Plan  No acute exacerbation  Albuterol as needed    Hypothyroidism- (present on admission)  Assessment & Plan  Continue levothyroxine       VTE prophylaxis: heparin ppx and therapeutic anticoagulation with Heparin  drip    I have performed a physical exam and reviewed and updated ROS and Plan today (1/4/2022). In review of yesterday's note (1/3/2022), there are no changes except as documented above.

## 2022-01-04 NOTE — ASSESSMENT & PLAN NOTE
- tele monitor  - Stress test  - hold BB therapy  - f/u A1c and lipid panel  - TTE  - analgesics for symptom management

## 2022-01-04 NOTE — ASSESSMENT & PLAN NOTE
Goal SBP less than 160  Continue clonidine, 0.1 mg daily  Continue lisinopril, 40 mg daily  Continue metoprolol succinate, 50 mg twice daily

## 2022-01-04 NOTE — ASSESSMENT & PLAN NOTE
S/p coronary angiography  Ongoing maximize medical therapy  Close cardiology follow-up  CTA reviewed was negative for PE

## 2022-01-04 NOTE — CONSULTS
CARDIOLOGY CONSULTATION NOTE      Date of Consultation: 1/4/2022  Consulting Provider: Bertin Mcdaniels MD    Patient Name: Antony Akbar  YOB: 1955  MRN: [id number]    Reason for Consultation:   Chest Pain and rising troponin    History of Present Illness:   Antony Akbar Sr. is a 66 year-old man with a past medical history of CAD with multiple stents (reportedly 6), pacemaker placement in 2008 with generator change 11/2021, 2 vessel bypass in 2012 at [facility] in [place], [place] with an SVG-? and unclear if LIMA graft is utilized, HTN, HLD, and COPD. He presented to the ED yesterday with a stabbing 7/10 chest pain with radiation down his left arm and between his shoulder blades in the setting of a planned OP lexiscan that was not performed. He has had a similar pain for the past 6 months but recently it has become more frequent and more intense needing SLN on a daily basis over past 3 months. He reports no provacative factors but states his nitro at home helps with the pain. He states the pain usually resolves within 30 minutes of onset. He also endorses nausea, palpitations, SOB,but denies edema, cough, fever.    He reports not taking Aspirin since he had a test done that showed he does not absorb it. He has been on chronic Plavix 75mg therapy for over 10 years.    Has MARCUS; does not tolerate cpap.    Previous smoker (quit in 1995), no family history of known CAD. No diabetes.     Medical History:     Past Medical History:   Diagnosis Date   • Asthma    • Back pain    • BPH (benign prostatic hyperplasia)    • Chronic obstructive pulmonary disease (HCC)    • Hypertension    • Hypothyroid        Surgical History:     Past Surgical History:   Procedure Laterality Date   • NC OPEN FIX INTER/SUBTROCH FX,IMPLNT Left 8/4/2021    Procedure: INSERTION, INTRAMEDULLARY MIKAEL, FEMUR, PROXIMAL;  Surgeon: Valentin Ríos M.D.;  Location: SURGERY [facility];  Service: Orthopedics   • CARDIAC  CATH, RIGHT/LEFT HEART         Family History:   No family history on file.    Social History:    reports that he has quit smoking. He has never used smokeless tobacco. He reports previous alcohol use. He reports previous drug use.  The patient is a prior smoker (quit in 1995)  Lives with her care-giver, moved from [place] about 6 months ago. No established Cardiologist or PCP yet.    Medications and Allergies:     Current Facility-Administered Medications   Medication Dose Route Frequency Provider Last Rate Last Admin   • nitroglycerin (NITROSTAT) tablet 0.4 mg  0.4 mg Sublingual Q5 MIN PRN Elvis Samuel D.O.       • gabapentin (NEURONTIN) capsule 100 mg  100 mg Oral 4X/DAY Bertin Mcdaniels M.D.       • tamsulosin (FLOMAX) capsule 0.4 mg  0.4 mg Oral Q EVENING Bertin Mcdaniels M.D.       • LORazepam (ATIVAN) tablet 0.5 mg  0.5 mg Oral Q4HRS PRN Bertin Mcdaniels M.D.       • morphine ER (MS CONTIN) tablet 15 mg  15 mg Oral TID PRN Monique Mares M.D.   15 mg at 01/04/22 0829   • labetalol (NORMODYNE/TRANDATE) injection 10 mg  10 mg Intravenous Once Milagros Belle M.D.       • atorvastatin (LIPITOR) tablet 40 mg  40 mg Oral DAILY Milagros Belle M.D.   40 mg at 01/04/22 0902   • cloNIDine (CATAPRES) tablet 0.1 mg  0.1 mg Oral QAM Milagros Belle M.D.   0.1 mg at 01/04/22 0829   • albuterol inhaler 2 Puff  2 Puff Inhalation Q6HRS PRN (RT) Milagros Belle M.D.       • clopidogrel (PLAVIX) tablet 75 mg  75 mg Oral QAM Milagros Belle M.D.   75 mg at 01/04/22 0829   • [Held by provider] metoprolol SR (TOPROL XL) tablet 50 mg  50 mg Oral BID Milagros Belle M.D.   50 mg at 01/03/22 2304   • lisinopril (PRINIVIL) tablet 40 mg  40 mg Oral DAILY Milagros Belle M.D.   40 mg at 01/04/22 0829   • sertraline (Zoloft) tablet 100 mg  100 mg Oral Q EVENING Milagros Belle M.D.   100 mg at 01/03/22 2307   • isosorbide mononitrate SR (IMDUR) tablet 60 mg  60 mg Oral BID Milagros Belle M.D.   60 mg at  01/04/22 0902   • famotidine (PEPCID) tablet 20 mg  20 mg Oral Q EVENING Milagros Belle M.D.   20 mg at 01/03/22 2304   • cyclobenzaprine (Flexeril) tablet 10 mg  10 mg Oral TID PRN Milagros Belle M.D.       • levothyroxine (SYNTHROID) tablet 50 mcg  50 mcg Oral AM ES Milagros Belle M.D.   50 mcg at 01/04/22 0828   • senna-docusate (PERICOLACE or SENOKOT S) 8.6-50 MG per tablet 2 Tablet  2 Tablet Oral BID Milagros Belle M.D.        And   • polyethylene glycol/lytes (MIRALAX) PACKET 1 Packet  1 Packet Oral QDAY PRN Milagros Belle M.D.        And   • magnesium hydroxide (MILK OF MAGNESIA) suspension 30 mL  30 mL Oral QDAY PRN Milagros Belle M.D.        And   • bisacodyl (DULCOLAX) suppository 10 mg  10 mg Rectal QDAY PRN Milagros Belle M.D.       • regadenoson (LEXISCAN) injection SOLN 0.4 mg  0.4 mg Intravenous Once PRN Milagros Belle M.D.       • aminophylline injection 100 mg  100 mg Intravenous Q5 MIN PRN Milagros Belle M.D.       • acetaminophen (Tylenol) tablet 650 mg  650 mg Oral Q6HRS PRN Milagros Belle M.D.       • cefTRIAXone (Rocephin) 2 g in  mL IVPB  2 g Intravenous Q24HR Milagros Belle M.D.   Stopped at 01/03/22 2335   • labetalol (NORMODYNE/TRANDATE) injection 10 mg  10 mg Intravenous Q4HRS PRN Milagors Belle M.D.   10 mg at 01/04/22 0009   • ondansetron (ZOFRAN) syringe/vial injection 4 mg  4 mg Intravenous Q4HRS PRN Milagros Belle M.D.       • ondansetron (ZOFRAN ODT) dispertab 4 mg  4 mg Oral Q4HRS PRN Milagros Belle M.D.       • promethazine (PHENERGAN) tablet 12.5-25 mg  12.5-25 mg Oral Q4HRS PRN Milagros Belle M.D.       • promethazine (PHENERGAN) suppository 12.5-25 mg  12.5-25 mg Rectal Q4HRS PRN Milagros Belle M.D.       • prochlorperazine (COMPAZINE) injection 5-10 mg  5-10 mg Intravenous Q4HRS PRN Milagros Belle M.D.       • mag hydrox-al hydrox-simeth (MAALOX PLUS ES or MYLANTA DS) suspension 30 mL  30 mL Oral Q4HRS PRN Milagros MCCORMICK  BJORN Belle       • guaiFENesin dextromethorphan (ROBITUSSIN DM) 100-10 MG/5ML syrup 10 mL  10 mL Oral Q6HRS PRN Milagros Belle M.D.       • nicotine (NICODERM) 14 MG/24HR 14 mg  14 mg Transdermal Daily-0600 Milagros Belle M.D.        And   • nicotine polacrilex (NICORETTE) 2 MG piece 2 mg  2 mg Oral Q HOUR PRN Milagros Belle M.D.       • morphine 4 MG/ML injection 2 mg  2 mg Intravenous Q3HRS PRN Milagros Belle M.D.   2 mg at 01/04/22 0112     Current Outpatient Medications   Medication Sig Dispense Refill   • levothyroxine (SYNTHROID) 50 MCG Tab Take 50 mcg by mouth every morning on an empty stomach.     • morphine ER (MS CONTIN) 15 MG Tab CR tablet Take 15 mg by mouth 3 times a day as needed (Pain).     • vitamin D3 (CHOLECALCIFEROL) 1000 Unit (25 mcg) Tab Take 1,000 Units by mouth every day.     • metoprolol SR (TOPROL XL) 50 MG TABLET SR 24 HR Take 1 Tablet by mouth 2 (two) times a day. 60 Tablet 3   • nitroglycerin (NITROSTAT) 0.4 MG SL Tab Place 1 Tablet under the tongue 1 time a day as needed for Chest Pain (chest pain). 25 Tablet 2   • atorvastatin (LIPITOR) 40 MG Tab Take 1 Tablet by mouth every day. 90 Tablet 2   • cloNIDine (CATAPRES) 0.1 MG Tab Take 1 Tablet by mouth every morning. 90 Tablet 2   • clopidogrel (PLAVIX) 75 MG Tab Take 1 Tablet by mouth every morning. 90 Tablet 2   • cyclobenzaprine (FLEXERIL) 10 mg Tab Take 1 Tablet by mouth 3 times a day as needed for Moderate Pain. 30 Tablet 0   • famotidine (PEPCID) 40 MG Tab Take 1 Tablet by mouth every evening. 90 Tablet 2   • isosorbide mononitrate SR (IMDUR) 60 MG TABLET SR 24 HR Take 1 Tablet by mouth 2 times a day. 180 Tablet 2   • lisinopril (PRINIVIL) 40 MG tablet Take 1 Tablet by mouth every day. 90 Tablet 2   • tamsulosin (FLOMAX) 0.4 MG capsule Take 1 Capsule by mouth every evening. 90 Capsule 2   • gabapentin (NEURONTIN) 100 MG Cap Take 100 mg by mouth 4 times a day.     • sertraline (ZOLOFT) 100 MG Tab Take 100 mg by mouth  "every evening.     • EPINEPHrine (EPIPEN 2-VEL) 0.3 MG/0.3ML Solution Auto-injector solution for injection Inject 0.3 mg into the shoulder, thigh, or buttocks as needed.     • albuterol 108 (90 Base) MCG/ACT Aero Soln inhalation aerosol Inhale 2 Puffs every 6 hours as needed for Shortness of Breath.         Allergies   Allergen Reactions   • Bee Venom Anaphylaxis       Review of Systems:   A pertinent review of systems was performed and was unremarkable except as per HPI above.    Vital Signs:   /86   Pulse 76   Temp 36.7 °C (98 °F) (Temporal)   Resp 16   Ht 1.702 m (5' 7\")   Wt 86.2 kg (190 lb)   SpO2 98%   BMI 29.76 kg/m²   Vitals:    01/04/22 0000 01/04/22 0100 01/04/22 0130 01/04/22 0200   BP: 148/95 (!) 181/87 (!) 177/85 154/86   Pulse: 88 69 75 76   Resp: (!) 39 13 (!) 23 16   Temp:       TempSrc:       SpO2: 92% 90% 97% 98%   Weight:       Height:         Body mass index is 29.76 kg/m².  Oxygen Therapy:  Pulse Oximetry: 98 %, O2 (LPM): 2, O2 Delivery Device: Silicone Nasal Cannula  Physical Exam    Physical Examination:     General: Well-appearing. Mild distress.  HEENT: EOM grossly intact, no scleral icterus, no pharyngeal erythema. no ear lob crease.   Neck:  No JVD, no bruits, trachea midline  Cardiovascular: Regular rate and rhythm. Normal S1, S2. no M/R/G. no LE edema.  2+ radial pulses, 2+ PT pulses  Pulmonary: CTAB. mild wheezing in lower lobes with no crackles/rhonchi. Normal respiratory effort.  Abdomen: Soft, NT, no brian hepatomegaly.  MSK/Ext: Mild clubbing with no cyanosis.  Skin: Warm and diaphoretic, no rashes.  Neurological: CN III-XII grossly intact. No gross focal motor deficits.   Psych: A&O x 3, appropriate affect.    Laboratories:   Estimated Creatinine Clearance: 90.9 mL/min (by C-G formula based on SCr of 0.86 mg/dL).  Recent Labs     01/03/22  1155 01/04/22  0305   CREATININE 0.84 0.86   BUN 10 8   POTASSIUM 3.6 3.6   SODIUM 143 143   CALCIUM 9.5 8.6   MAGNESIUM  --  1.9 "   CO2 21 22   ALBUMIN 5.1* 3.8     Recent Labs     01/03/22  1155 01/04/22  0305   GLUCOSE 76 93     Recent Labs     01/03/22  1155 01/04/22  0305   ASTSGOT 14 14   ALTSGPT 9 8   ALKPHOSPHAT 140* 110*   INR  --  1.06     Recent Labs     01/03/22  1155 01/04/22  0305   WBC 18.4* 11.0*   HEMOGLOBIN 15.3 12.7*   PLATELETCT 218 156*     Recent Labs     01/03/22  1155 01/04/22  0305 01/04/22  0739   TROPONINT 20* 35* 38*   NTPROBNP  --  558*  --      Lab Results   Component Value Date/Time    LDL 43 01/04/2022 0305     Lab Results   Component Value Date/Time    HDL 33 (A) 01/04/2022 0305       Lab Results   Component Value Date/Time    TRIGLYCERIDE 135 01/04/2022 0305       Lab Results   Component Value Date/Time    CHOLSTRLTOT 103 01/04/2022 0305       Studies:   Echocardiography  None     Coronary Angiography/Cardiac Catheterization  None     Stress Testing  None     X-ray/CT/MRI     CXR:  Left chest cardiac device.  HEART: Not enlarged. There is atherosclerotic calcification in the aortic arch.  LUNGS: No areas of air space disease are demonstrated.  PLEURA: No effusion or pneumothorax.     IMPRESSION:   1.  No acute cardiac or pulmonary abnormalities are identified.     CT-CTA Chest  IMPRESSION  1.  No CT evidence of pulmonary embolism  2.  Cholelithiasis.  3.  Calcific atherosclerosis.  4.  1 cm mass within each adrenal gland. These could represent adrenal adenomas. Follow-up noncontrast CT of the adrenal glands could be obtained for further evaluation if clinically indicated.     Ultrasound  None     Electrophysiology     Regular rate and rhythm, normal axis, atrial paced, incomplete right bundle branch block, prolonged QT, no ST elevations    Assessment and Recommendations:   Antony Akbar is a 65 y/o male with an extensive cardiac history with CAD, multiple stents, 2 vessel cardiac bypass, pacemaker placement, and HTN who's presentation of chest pain at rest which is increasing in frequency and relived with nitro  and mildly elevated troponin is concerning for an NSTEMI. Also found to be hypertensive.     #NSTEMI  #HTN emergency, with SBP difference of < 20mmHg between both arms    Recommendations     -Continue Metoprolol  -Start Heparin drip ACS protocol  -Start Nitro drip to target CP free with an SBP close to 120/80 mmHg  -start Asprin load + maintenance  -continue Clopidogrel  -continue Atorvastatin  -Change Isosorbide mononitrate to once a day  -Trend troponin  -stat EKG with worsening symptoms or hemodynamic changes  -Plan for coronary angiogram (plan access L radial) tomorrow as long as CP is comtrolled today   -Continue clonidine for now  -Continue lisinopril    Cardiology will continue to follow along.    Thank you for allowing me to participate in the care of this patient. Discussed above with consulting provider and RN.    Please contact me with any questions.    French Herrera MD, MPH  Interventional Cardiology  [facility], [address]  [address] [place] [id number]  Phone: [phone]

## 2022-01-04 NOTE — ASSESSMENT & PLAN NOTE
Ruled out likely  SIRS secondary to NSTEMI     patient has no clear source of infection and clinically does not appear to be septic his leukocytosis is likely reactive  Will DC antibiotics and monitor

## 2022-01-05 ENCOUNTER — APPOINTMENT (OUTPATIENT)
Dept: CARDIOLOGY | Facility: MEDICAL CENTER | Age: 67
DRG: 281 | End: 2022-01-05
Attending: INTERNAL MEDICINE
Payer: MEDICARE

## 2022-01-05 LAB
ACT BLD: 261 SEC (ref 74–137)
ACT BLD: 333 SEC (ref 74–137)
ANION GAP SERPL CALC-SCNC: 16 MMOL/L (ref 7–16)
BASOPHILS # BLD AUTO: 0.7 % (ref 0–1.8)
BASOPHILS # BLD: 0.08 K/UL (ref 0–0.12)
BUN SERPL-MCNC: 9 MG/DL (ref 8–22)
CALCIUM SERPL-MCNC: 8.3 MG/DL (ref 8.5–10.5)
CHLORIDE SERPL-SCNC: 99 MMOL/L (ref 96–112)
CO2 SERPL-SCNC: 20 MMOL/L (ref 20–33)
CREAT SERPL-MCNC: 0.88 MG/DL (ref 0.5–1.4)
EOSINOPHIL # BLD AUTO: 0.42 K/UL (ref 0–0.51)
EOSINOPHIL NFR BLD: 3.8 % (ref 0–6.9)
ERYTHROCYTE [DISTWIDTH] IN BLOOD BY AUTOMATED COUNT: 53.3 FL (ref 35.9–50)
GLUCOSE BLD-MCNC: 72 MG/DL (ref 65–99)
GLUCOSE BLD-MCNC: 83 MG/DL (ref 65–99)
GLUCOSE BLD-MCNC: 88 MG/DL (ref 65–99)
GLUCOSE SERPL-MCNC: 254 MG/DL (ref 65–99)
HCT VFR BLD AUTO: 41.3 % (ref 42–52)
HGB BLD-MCNC: 12.7 G/DL (ref 14–18)
IMM GRANULOCYTES # BLD AUTO: 0.05 K/UL (ref 0–0.11)
IMM GRANULOCYTES NFR BLD AUTO: 0.4 % (ref 0–0.9)
LYMPHOCYTES # BLD AUTO: 2.46 K/UL (ref 1–4.8)
LYMPHOCYTES NFR BLD: 22 % (ref 22–41)
MAGNESIUM SERPL-MCNC: 2.3 MG/DL (ref 1.5–2.5)
MCH RBC QN AUTO: 25.2 PG (ref 27–33)
MCHC RBC AUTO-ENTMCNC: 30.8 G/DL (ref 33.7–35.3)
MCV RBC AUTO: 82.1 FL (ref 81.4–97.8)
MONOCYTES # BLD AUTO: 0.75 K/UL (ref 0–0.85)
MONOCYTES NFR BLD AUTO: 6.7 % (ref 0–13.4)
NEUTROPHILS # BLD AUTO: 7.4 K/UL (ref 1.82–7.42)
NEUTROPHILS NFR BLD: 66.4 % (ref 44–72)
NRBC # BLD AUTO: 0 K/UL
NRBC BLD-RTO: 0 /100 WBC
PLATELET # BLD AUTO: 131 K/UL (ref 164–446)
PMV BLD AUTO: 10.9 FL (ref 9–12.9)
POTASSIUM SERPL-SCNC: 4 MMOL/L (ref 3.6–5.5)
RBC # BLD AUTO: 5.03 M/UL (ref 4.7–6.1)
SODIUM SERPL-SCNC: 135 MMOL/L (ref 135–145)
UFH PPP CHRO-ACNC: 0.38 IU/ML
WBC # BLD AUTO: 11.2 K/UL (ref 4.8–10.8)

## 2022-01-05 PROCEDURE — 700105 HCHG RX REV CODE 258: Performed by: INTERNAL MEDICINE

## 2022-01-05 PROCEDURE — 700102 HCHG RX REV CODE 250 W/ 637 OVERRIDE(OP): Performed by: NURSE PRACTITIONER

## 2022-01-05 PROCEDURE — 93567 NJX CAR CTH SPRVLV AORTGRPHY: CPT

## 2022-01-05 PROCEDURE — A9270 NON-COVERED ITEM OR SERVICE: HCPCS | Performed by: HOSPITALIST

## 2022-01-05 PROCEDURE — 99233 SBSQ HOSP IP/OBS HIGH 50: CPT | Mod: 25 | Performed by: NURSE PRACTITIONER

## 2022-01-05 PROCEDURE — 99153 MOD SED SAME PHYS/QHP EA: CPT

## 2022-01-05 PROCEDURE — 93459 L HRT ART/GRFT ANGIO: CPT | Mod: 26 | Performed by: INTERNAL MEDICINE

## 2022-01-05 PROCEDURE — 82962 GLUCOSE BLOOD TEST: CPT

## 2022-01-05 PROCEDURE — 700102 HCHG RX REV CODE 250 W/ 637 OVERRIDE(OP): Performed by: INTERNAL MEDICINE

## 2022-01-05 PROCEDURE — 93571 IV DOP VEL&/PRESS C FLO 1ST: CPT | Mod: 26,52,RC | Performed by: INTERNAL MEDICINE

## 2022-01-05 PROCEDURE — 770000 HCHG ROOM/CARE - INTERMEDIATE ICU *

## 2022-01-05 PROCEDURE — 4A033BC MEASUREMENT OF ARTERIAL PRESSURE, CORONARY, PERCUTANEOUS APPROACH: ICD-10-PCS | Performed by: INTERNAL MEDICINE

## 2022-01-05 PROCEDURE — B2181ZZ FLUOROSCOPY OF LEFT INTERNAL MAMMARY BYPASS GRAFT USING LOW OSMOLAR CONTRAST: ICD-10-PCS | Performed by: INTERNAL MEDICINE

## 2022-01-05 PROCEDURE — 80048 BASIC METABOLIC PNL TOTAL CA: CPT

## 2022-01-05 PROCEDURE — 700102 HCHG RX REV CODE 250 W/ 637 OVERRIDE(OP): Performed by: STUDENT IN AN ORGANIZED HEALTH CARE EDUCATION/TRAINING PROGRAM

## 2022-01-05 PROCEDURE — 93567 NJX CAR CTH SPRVLV AORTGRPHY: CPT | Performed by: INTERNAL MEDICINE

## 2022-01-05 PROCEDURE — 85025 COMPLETE CBC W/AUTO DIFF WBC: CPT

## 2022-01-05 PROCEDURE — A9270 NON-COVERED ITEM OR SERVICE: HCPCS | Performed by: NURSE PRACTITIONER

## 2022-01-05 PROCEDURE — B2151ZZ FLUOROSCOPY OF LEFT HEART USING LOW OSMOLAR CONTRAST: ICD-10-PCS | Performed by: INTERNAL MEDICINE

## 2022-01-05 PROCEDURE — A9270 NON-COVERED ITEM OR SERVICE: HCPCS | Performed by: STUDENT IN AN ORGANIZED HEALTH CARE EDUCATION/TRAINING PROGRAM

## 2022-01-05 PROCEDURE — B2111ZZ FLUOROSCOPY OF MULTIPLE CORONARY ARTERIES USING LOW OSMOLAR CONTRAST: ICD-10-PCS | Performed by: INTERNAL MEDICINE

## 2022-01-05 PROCEDURE — 700111 HCHG RX REV CODE 636 W/ 250 OVERRIDE (IP)

## 2022-01-05 PROCEDURE — 99233 SBSQ HOSP IP/OBS HIGH 50: CPT | Performed by: HOSPITALIST

## 2022-01-05 PROCEDURE — 700111 HCHG RX REV CODE 636 W/ 250 OVERRIDE (IP): Performed by: INTERNAL MEDICINE

## 2022-01-05 PROCEDURE — A9270 NON-COVERED ITEM OR SERVICE: HCPCS | Performed by: EMERGENCY MEDICINE

## 2022-01-05 PROCEDURE — 700102 HCHG RX REV CODE 250 W/ 637 OVERRIDE(OP): Performed by: EMERGENCY MEDICINE

## 2022-01-05 PROCEDURE — 700111 HCHG RX REV CODE 636 W/ 250 OVERRIDE (IP): Performed by: STUDENT IN AN ORGANIZED HEALTH CARE EDUCATION/TRAINING PROGRAM

## 2022-01-05 PROCEDURE — A9270 NON-COVERED ITEM OR SERVICE: HCPCS | Performed by: INTERNAL MEDICINE

## 2022-01-05 PROCEDURE — 83735 ASSAY OF MAGNESIUM: CPT

## 2022-01-05 PROCEDURE — 4A023N7 MEASUREMENT OF CARDIAC SAMPLING AND PRESSURE, LEFT HEART, PERCUTANEOUS APPROACH: ICD-10-PCS | Performed by: INTERNAL MEDICINE

## 2022-01-05 PROCEDURE — 99152 MOD SED SAME PHYS/QHP 5/>YRS: CPT | Performed by: INTERNAL MEDICINE

## 2022-01-05 PROCEDURE — B2121ZZ FLUOROSCOPY OF SINGLE CORONARY ARTERY BYPASS GRAFT USING LOW OSMOLAR CONTRAST: ICD-10-PCS | Performed by: INTERNAL MEDICINE

## 2022-01-05 PROCEDURE — 700102 HCHG RX REV CODE 250 W/ 637 OVERRIDE(OP): Performed by: HOSPITALIST

## 2022-01-05 PROCEDURE — 700101 HCHG RX REV CODE 250

## 2022-01-05 PROCEDURE — 85347 COAGULATION TIME ACTIVATED: CPT

## 2022-01-05 PROCEDURE — 85520 HEPARIN ASSAY: CPT

## 2022-01-05 PROCEDURE — 700117 HCHG RX CONTRAST REV CODE 255: Performed by: INTERNAL MEDICINE

## 2022-01-05 RX ORDER — CARVEDILOL 25 MG/1
25 TABLET ORAL 2 TIMES DAILY WITH MEALS
Status: DISCONTINUED | OUTPATIENT
Start: 2022-01-05 | End: 2022-01-06 | Stop reason: HOSPADM

## 2022-01-05 RX ORDER — SODIUM CHLORIDE 9 MG/ML
INJECTION, SOLUTION INTRAVENOUS CONTINUOUS
Status: ACTIVE | OUTPATIENT
Start: 2022-01-05 | End: 2022-01-05

## 2022-01-05 RX ORDER — VERAPAMIL HYDROCHLORIDE 2.5 MG/ML
INJECTION, SOLUTION INTRAVENOUS
Status: COMPLETED
Start: 2022-01-05 | End: 2022-01-05

## 2022-01-05 RX ORDER — HEPARIN SODIUM 200 [USP'U]/100ML
INJECTION, SOLUTION INTRAVENOUS
Status: COMPLETED
Start: 2022-01-05 | End: 2022-01-05

## 2022-01-05 RX ORDER — MIDAZOLAM HYDROCHLORIDE 1 MG/ML
INJECTION INTRAMUSCULAR; INTRAVENOUS
Status: COMPLETED
Start: 2022-01-05 | End: 2022-01-05

## 2022-01-05 RX ORDER — HEPARIN SODIUM 1000 [USP'U]/ML
INJECTION, SOLUTION INTRAVENOUS; SUBCUTANEOUS
Status: COMPLETED
Start: 2022-01-05 | End: 2022-01-05

## 2022-01-05 RX ORDER — DEXTROSE MONOHYDRATE 25 G/50ML
50 INJECTION, SOLUTION INTRAVENOUS
Status: DISCONTINUED | OUTPATIENT
Start: 2022-01-05 | End: 2022-01-06 | Stop reason: HOSPADM

## 2022-01-05 RX ORDER — ISOSORBIDE MONONITRATE 60 MG/1
120 TABLET, EXTENDED RELEASE ORAL
Status: DISCONTINUED | OUTPATIENT
Start: 2022-01-06 | End: 2022-01-06 | Stop reason: HOSPADM

## 2022-01-05 RX ORDER — RANOLAZINE 500 MG/1
500 TABLET, EXTENDED RELEASE ORAL 2 TIMES DAILY
Status: DISCONTINUED | OUTPATIENT
Start: 2022-01-05 | End: 2022-01-06 | Stop reason: HOSPADM

## 2022-01-05 RX ORDER — LIDOCAINE HYDROCHLORIDE 20 MG/ML
INJECTION, SOLUTION INFILTRATION; PERINEURAL
Status: COMPLETED
Start: 2022-01-05 | End: 2022-01-05

## 2022-01-05 RX ORDER — MORPHINE SULFATE 15 MG/1
15 TABLET, FILM COATED, EXTENDED RELEASE ORAL 3 TIMES DAILY PRN
Status: DISCONTINUED | OUTPATIENT
Start: 2022-01-05 | End: 2022-01-06 | Stop reason: HOSPADM

## 2022-01-05 RX ORDER — CHLORTHALIDONE 25 MG/1
25 TABLET ORAL
Status: DISCONTINUED | OUTPATIENT
Start: 2022-01-05 | End: 2022-01-06 | Stop reason: HOSPADM

## 2022-01-05 RX ADMIN — MORPHINE SULFATE 15 MG: 15 TABLET, FILM COATED, EXTENDED RELEASE ORAL at 20:26

## 2022-01-05 RX ADMIN — GABAPENTIN 100 MG: 100 CAPSULE ORAL at 13:03

## 2022-01-05 RX ADMIN — RANOLAZINE 500 MG: 500 TABLET, EXTENDED RELEASE ORAL at 17:17

## 2022-01-05 RX ADMIN — TAMSULOSIN HYDROCHLORIDE 0.4 MG: 0.4 CAPSULE ORAL at 17:17

## 2022-01-05 RX ADMIN — HEPARIN SODIUM 2000 UNITS: 200 INJECTION, SOLUTION INTRAVENOUS at 09:52

## 2022-01-05 RX ADMIN — NITROGLYCERIN 10 ML: 20 INJECTION INTRAVENOUS at 09:52

## 2022-01-05 RX ADMIN — VERAPAMIL HYDROCHLORIDE 5 MG: 2.5 INJECTION, SOLUTION INTRAVENOUS at 09:51

## 2022-01-05 RX ADMIN — CLOPIDOGREL BISULFATE 75 MG: 75 TABLET ORAL at 05:19

## 2022-01-05 RX ADMIN — ATORVASTATIN CALCIUM 40 MG: 40 TABLET, FILM COATED ORAL at 05:20

## 2022-01-05 RX ADMIN — GABAPENTIN 100 MG: 100 CAPSULE ORAL at 20:26

## 2022-01-05 RX ADMIN — MORPHINE SULFATE 2 MG: 4 INJECTION INTRAVENOUS at 19:39

## 2022-01-05 RX ADMIN — DOCUSATE SODIUM 50 MG AND SENNOSIDES 8.6 MG 2 TABLET: 8.6; 5 TABLET, FILM COATED ORAL at 17:16

## 2022-01-05 RX ADMIN — GABAPENTIN 100 MG: 100 CAPSULE ORAL at 08:18

## 2022-01-05 RX ADMIN — NITROGLYCERIN 70 MCG/MIN: 20 INJECTION INTRAVENOUS at 05:24

## 2022-01-05 RX ADMIN — SODIUM CHLORIDE: 9 INJECTION, SOLUTION INTRAVENOUS at 12:15

## 2022-01-05 RX ADMIN — ISOSORBIDE MONONITRATE 60 MG: 30 TABLET, EXTENDED RELEASE ORAL at 05:19

## 2022-01-05 RX ADMIN — METOPROLOL SUCCINATE 50 MG: 25 TABLET, FILM COATED, EXTENDED RELEASE ORAL at 08:17

## 2022-01-05 RX ADMIN — CLONIDINE HYDROCHLORIDE 0.1 MG: 0.1 TABLET ORAL at 05:19

## 2022-01-05 RX ADMIN — CYCLOBENZAPRINE 10 MG: 10 TABLET, FILM COATED ORAL at 08:26

## 2022-01-05 RX ADMIN — FENTANYL CITRATE 100 MCG: 50 INJECTION, SOLUTION INTRAMUSCULAR; INTRAVENOUS at 10:18

## 2022-01-05 RX ADMIN — LORAZEPAM 0.5 MG: 0.5 TABLET ORAL at 19:01

## 2022-01-05 RX ADMIN — FENTANYL CITRATE 100 MCG: 50 INJECTION, SOLUTION INTRAMUSCULAR; INTRAVENOUS at 10:38

## 2022-01-05 RX ADMIN — IOHEXOL 120 ML: 350 INJECTION, SOLUTION INTRAVENOUS at 11:13

## 2022-01-05 RX ADMIN — FAMOTIDINE 20 MG: 20 TABLET ORAL at 17:17

## 2022-01-05 RX ADMIN — LEVOTHYROXINE SODIUM 50 MCG: 0.05 TABLET ORAL at 05:19

## 2022-01-05 RX ADMIN — ASPIRIN 81 MG: 81 TABLET, COATED ORAL at 05:19

## 2022-01-05 RX ADMIN — GABAPENTIN 100 MG: 100 CAPSULE ORAL at 17:17

## 2022-01-05 RX ADMIN — MORPHINE SULFATE 15 MG: 15 TABLET, FILM COATED, EXTENDED RELEASE ORAL at 05:22

## 2022-01-05 RX ADMIN — LIDOCAINE HYDROCHLORIDE: 20 INJECTION, SOLUTION INFILTRATION; PERINEURAL at 09:51

## 2022-01-05 RX ADMIN — HEPARIN SODIUM: 1000 INJECTION, SOLUTION INTRAVENOUS; SUBCUTANEOUS at 09:51

## 2022-01-05 RX ADMIN — CYCLOBENZAPRINE 10 MG: 10 TABLET, FILM COATED ORAL at 20:26

## 2022-01-05 RX ADMIN — MIDAZOLAM 2 MG: 1 INJECTION INTRAMUSCULAR; INTRAVENOUS at 10:38

## 2022-01-05 RX ADMIN — MIDAZOLAM 2 MG: 1 INJECTION INTRAMUSCULAR; INTRAVENOUS at 10:19

## 2022-01-05 RX ADMIN — CARVEDILOL 25 MG: 25 TABLET, FILM COATED ORAL at 20:26

## 2022-01-05 RX ADMIN — SERTRALINE 100 MG: 100 TABLET, FILM COATED ORAL at 17:16

## 2022-01-05 RX ADMIN — LISINOPRIL 40 MG: 20 TABLET ORAL at 05:19

## 2022-01-05 ASSESSMENT — PAIN DESCRIPTION - PAIN TYPE
TYPE: ACUTE PAIN;CHRONIC PAIN
TYPE: CHRONIC PAIN
TYPE: ACUTE PAIN;CHRONIC PAIN
TYPE: ACUTE PAIN;CHRONIC PAIN

## 2022-01-05 ASSESSMENT — ENCOUNTER SYMPTOMS
FEVER: 0
MUSCULOSKELETAL NEGATIVE: 1
NERVOUS/ANXIOUS: 1
CHEST TIGHTNESS: 0
GASTROINTESTINAL NEGATIVE: 1
CONSTITUTIONAL NEGATIVE: 1
SHORTNESS OF BREATH: 0
PALPITATIONS: 0
ABDOMINAL DISTENTION: 0
BLOOD IN STOOL: 0
DIZZINESS: 0
ORTHOPNEA: 0
EYES NEGATIVE: 1
ABDOMINAL PAIN: 0
WEAKNESS: 0
LIGHT-HEADEDNESS: 0
RESPIRATORY NEGATIVE: 1
NEUROLOGICAL NEGATIVE: 1
FATIGUE: 0

## 2022-01-05 NOTE — PROCEDURES
"CARDIAC CATHETERIZATION REPORT    REFERRING: French Herrera MD    PROCEDURE PHYSICIAN: Sergey Daniel MD, [facility], [facility]  ASSISTANT: None    IMPRESSIONS:  1. Non-coronary source of chest pain  2. Severe native one vessel CAD  3. 1/2 patent bypass grafts  4. Normal LVEDP  5. Incidental note of severe PAD in the R SFA     Recommendations:  Further recommendations per the rounding team    Pre-procedure diagnosis: Unstable angina  Post-procedure diagnosis: non-coronary chest symptoms    Procedure performed  Selective coronary angiography  Left heart catheterization  Bypass graft angiography  Supravalvular aortography  Instantaneous wave free ratio (iFR) (RCA)    Conscious sedation was supervised by myself and administered by trained personnel using fentanyl and versed between 1004 and 1113. The patient tolerated sedation without complication.     Procedure Description  1. Access: 5/6 [place] left distal radial artery Micropuncture technique was utilized following local anesthesia with lidocaine.  A radial cocktail containing verapamil and saline was administered in the radial artery sheath. During the procedure the patient moved the left arm and contaminated the left wrist. I then placed a second catheter in the R femoral artery with micropuncture and dynamic US guidance    2. Diagnostic description: The catheter was passed to the central circulation with the aide of J tipped 0.35\" wire. A 6F JR4, 6F JL3.5, 6F Pigtail and 5.2F Bartorelli were used to inject the coronary circulation, bypass grafts and inject the left ventricle during invasive hemodynamic monitoring and inject the ascending aorta.     3. Closing: At completion of the procedure the relevant equipment was removed from the body and hemostasis achieved by Radial band and Perclose    Findings   Hemodynamics:   Aorta: 138/72 mmHg  LVEDP: 7 mmHg  No significant pullback gradient across the aortic valve    Coronary Anatomy   Left Main: Diffuse 30% stenosis   LAD: " Patent proximal stent. The LAD is congenitally of small caliber (1.5-2.0 mm). The first diagonal is grafted and proximal 90% stenosis.    LCx: Co-dominant, proximal 20% , mid 30% stenosis. The OM is large and has proximal 20% stenosis, the left PLB is normal. The left PDA is small and normal   RCA: Co-dominant, small vessel with ostial 50% stenosis. iFR is 0.96. The mid RCA has 40% stenosis. The PDA branches from the mid right and is normal.      Grafts   LIMA-diagonal: Patent   VG-unknown - occluded at the anastamosis    Left ventriculography:   LVEF= 55%. Normal wall motion No mitral regurgitation Ectatic ascending aorta    Ascending aorta:  Ectatic. No graft seen    Technical Factors  1. Complications: None  2. Estimated Blood Loss: <50 cc  3. Specimens: None  4. Contrast Volume: 120 ml  5. Medications: Radial cocktail (Verapamil 2.5 mg, Nitroglycerin 100 mcg) Heparin to maintain ACT >250  6. Radiation (air kerma): 376 mGy

## 2022-01-05 NOTE — CARE PLAN
The patient is Stable - Low risk of patient condition declining or worsening    Shift Goals  Clinical Goals: Stable hemodynamics and BP/controlled pain  Patient Goals: Pain control/Rest    Progress made toward(s) clinical / shift goals:    Problem: Hemodynamics  Goal: Patient's hemodynamics, fluid balance and neurologic status will be stable or improve  Outcome: Progressing     Problem: Respiratory  Goal: Patient will achieve/maintain optimum respiratory ventilation and gas exchange  Outcome: Progressing     Problem: Pain - Standard  Goal: Alleviation of pain or a reduction in pain to the patient’s comfort goal  Outcome: Progressing     Problem: Knowledge Deficit - Standard  Goal: Patient and family/care givers will demonstrate understanding of plan of care, disease process/condition, diagnostic tests and medications  Outcome: Progressing       Patient is not progressing towards the following goals:

## 2022-01-05 NOTE — PROGRESS NOTES
Spoke with wife, Virginia, to inform her that patient has been brought back to his room. Informed her that he is awake and alert. She stated she would call his personal phone.

## 2022-01-05 NOTE — CARE PLAN
Problem: Hemodynamics  Goal: Patient's hemodynamics, fluid balance and neurologic status will be stable or improve  1/4/2022 1824 by Kristina Rangel R.N.  Outcome: Progressing  1/4/2022 1821 by Kristina Rangel R.N.  Outcome: Progressing     Problem: Fluid Volume  Goal: Fluid volume balance will be maintained  1/4/2022 1824 by Kristina Rangel R.N.  Outcome: Progressing  1/4/2022 1821 by Kristina Rangel R.N.  Outcome: Progressing     Problem: Urinary - Renal Perfusion  Goal: Ability to achieve and maintain adequate renal perfusion and functioning will improve  1/4/2022 1824 by Kristina Rangel R.N.  Outcome: Progressing  1/4/2022 1821 by Kristina Rangel R.N.  Outcome: Progressing     Problem: Respiratory  Goal: Patient will achieve/maintain optimum respiratory ventilation and gas exchange  1/4/2022 1824 by Kristina Rangel R.N.  Outcome: Progressing  1/4/2022 1821 by Kristina Rangel R.N.  Outcome: Progressing     Problem: Mechanical Ventilation  Goal: Safe management of artificial airway and ventilation  1/4/2022 1824 by Kristina Rangel R.N.  Outcome: Progressing  1/4/2022 1821 by Kristina Rangel R.N.  Outcome: Progressing  Goal: Successful weaning off mechanical ventilator, spontaneously maintains adequate gas exchange  1/4/2022 1824 by Kristina Rangel R.N.  Outcome: Progressing  1/4/2022 1821 by Kristina Rangel R.N.  Outcome: Progressing  Goal: Patient will be able to express needs and understand communication  1/4/2022 1824 by Kristina Rangel R.N.  Outcome: Progressing  1/4/2022 1821 by Krsitina Rangel R.N.  Outcome: Progressing     Problem: Mechanical Ventilation  Goal: Safe management of artificial airway and ventilation  1/4/2022 1824 by Kristina Rangel R.N.  Outcome: Progressing  1/4/2022 1821 by Kristina Rangel R.N.  Outcome: Progressing  Goal: Successful weaning off mechanical ventilator, spontaneously maintains adequate gas exchange  1/4/2022 1824 by Stephney Juan Carlos, R.N.  Outcome: Progressing  1/4/2022 1823  by Kristina Rangel R.N.  Outcome: Progressing  Goal: Patient will be able to express needs and understand communication  1/4/2022 1824 by Kristina Rangel R.N.  Outcome: Progressing  1/4/2022 1821 by Kristina Rangel R.N.  Outcome: Progressing     Problem: Physical Regulation  Goal: Diagnostic test results will improve  1/4/2022 1824 by Kristina Rangel R.N.  Outcome: Progressing  1/4/2022 1821 by Kristina Rangel R.N.  Outcome: Progressing  Goal: Signs and symptoms of infection will decrease  1/4/2022 1824 by Kristina Rangel R.N.  Outcome: Progressing  1/4/2022 1821 by Kristina Rangel R.N.  Outcome: Progressing     Problem: Fall Risk  Goal: Patient will remain free from falls  1/4/2022 1824 by Kristina Rangel R.N.  Outcome: Progressing  1/4/2022 1821 by Kristina Rangel R.N.  Outcome: Progressing     Problem: Pain - Standard  Goal: Alleviation of pain or a reduction in pain to the patient’s comfort goal  1/4/2022 1824 by Kristina Rangel R.N.  Outcome: Progressing  1/4/2022 1821 by Kristina Rangel R.N.  Outcome: Progressing     Problem: Knowledge Deficit - Standard  Goal: Patient and family/care givers will demonstrate understanding of plan of care, disease process/condition, diagnostic tests and medications  1/4/2022 1824 by Kristina Rangel R.N.  Outcome: Progressing  1/4/2022 1821 by Kristina Rangel R.N.  Outcome: Progressing

## 2022-01-05 NOTE — ASSESSMENT & PLAN NOTE
Prior bypass grafting,  Current angiography does not indicate flow-limiting active disease to explain his chest pain  Medical management per cardiology and close outpatient follow-up  Antianginal regimen

## 2022-01-05 NOTE — PROGRESS NOTES
"Cardiology Follow Up Progress Note    Date of Service  1/5/2022    Attending Physician  eBrtin Mcdaniels M.D.    Chief Complaint   Chief Complaint   Patient presents with   • Chest Pain     Pt reports awakening this morning with mid-left 9/10 chest pain that does not radiate. Pt reports being diaphoretic. Pt has a cardiac history of stents and a medtronic pacemaker placed and a double bypass surgery.        HPI  Antony Akbar Sr. is a 66 y.o. male admitted 1/3/2022 with per Dr. Herrera, \"a past medical history of CAD with multiple stents (reportedly 6), pacemaker placement in 2008 with generator change 11/2021, 2 vessel bypass in 2012 at [facility] in [place], [place] with an SVG-? and unclear if LIMA graft is utilized, HTN, HLD, and COPD. He presented to the ED yesterday with a stabbing 7/10 chest pain with radiation down his left arm and between his shoulder blades in the setting of a planned OP lexiscan that was not performed. He has had a similar pain for the past 6 months but recently it has become more frequent and more intense needing SLN on a daily basis over past 3 months. He reports no provacative factors but states his nitro at home helps with the pain. He states the pain usually resolves within 30 minutes of onset. He also endorses nausea, palpitations, SOB,but denies edema, cough, fever.     He reports not taking Aspirin since he had a test done that showed he does not absorb it. He has been on chronic Plavix 75mg therapy for over 10 years.     Has MARCUS; does not tolerate cpap.     Previous smoker (quit in 1995), no family history of known CAD. No diabetes.     Interim Events  1/5/2022: No overnight cardiac events. Reports decreased chest pain 1/10 on NTG gtt at 30. Tele monitoring personally interpreted by me shows 100% paced. VSS; RA. Labs reviewed; K- 4.0, BUN/Crt- 9/0.88. NPO since midnight for [facility] today. PO medications adjusted to optimize BP control.     [facility] (1/5/2022):   IMPRESSIONS:  1. " Non-coronary source of chest pain  2. Severe native one vessel CAD  3. 1/2 patent bypass grafts  4. Normal LVEDP  5. Incidental note of severe PAD in the R SFA    Optimize antianginals with imdur and add renexa with close clinic FU    Future Appointments   Date Time Provider Department Center   1/13/2022  4:30 PM DIPIKA Peacock [facility] None   2/25/2022 12:15 PM [facility] EXAM 12 ECHO [facility]   2/25/2022  1:15 PM [facility] EXAM 6 NONINV [facility]         Review of Systems  Review of Systems   Constitutional: Negative for fatigue and fever.   Respiratory: Negative for chest tightness and shortness of breath.    Cardiovascular: Positive for chest pain. Negative for palpitations and leg swelling.   Gastrointestinal: Negative for abdominal distention, abdominal pain and blood in stool.   Genitourinary: Negative for dysuria.   Neurological: Negative for dizziness, syncope, weakness and light-headedness.       Vital signs in last 24 hours  Temp:  [36.7 °C (98 °F)-36.7 °C (98.1 °F)] 36.7 °C (98.1 °F)  Pulse:  [68-97] 70  Resp:  [13-22] 18  BP: (116-195)/() 120/70  SpO2:  [87 %-99 %] 94 %    Physical Exam  Physical Exam  Vitals and nursing note reviewed.   Constitutional:       General: He is not in acute distress.     Comments: BMI 29   Cardiovascular:      Rate and Rhythm: Normal rate and regular rhythm.      Pulses: Normal pulses.      Heart sounds: Normal heart sounds.   Pulmonary:      Effort: Pulmonary effort is normal. No respiratory distress.      Breath sounds: Normal breath sounds.   Abdominal:      General: Bowel sounds are normal. There is distension.      Palpations: Abdomen is soft.   Musculoskeletal:         General: No swelling.      Cervical back: Normal range of motion.      Right lower leg: No edema.      Left lower leg: No edema.   Skin:     General: Skin is warm and dry.   Neurological:      General: No focal deficit present.      Mental Status: He is alert and oriented to person,  place, and time. Mental status is at baseline.   Psychiatric:         Mood and Affect: Mood normal.         Behavior: Behavior normal.         Lab Review  Lab Results   Component Value Date/Time    WBC 11.2 (H) 01/05/2022 02:03 AM    RBC 5.03 01/05/2022 02:03 AM    HEMOGLOBIN 12.7 (L) 01/05/2022 02:03 AM    HEMATOCRIT 41.3 (L) 01/05/2022 02:03 AM    MCV 82.1 01/05/2022 02:03 AM    MCH 25.2 (L) 01/05/2022 02:03 AM    MCHC 30.8 (L) 01/05/2022 02:03 AM    MPV 10.9 01/05/2022 02:03 AM      Lab Results   Component Value Date/Time    SODIUM 135 01/05/2022 02:03 AM    POTASSIUM 4.0 01/05/2022 02:03 AM    CHLORIDE 99 01/05/2022 02:03 AM    CO2 20 01/05/2022 02:03 AM    GLUCOSE 254 (H) 01/05/2022 02:03 AM    BUN 9 01/05/2022 02:03 AM    CREATININE 0.88 01/05/2022 02:03 AM      Lab Results   Component Value Date/Time    ASTSGOT 14 01/04/2022 03:05 AM    ALTSGPT 8 01/04/2022 03:05 AM     Lab Results   Component Value Date/Time    CHOLSTRLTOT 103 01/04/2022 03:05 AM    LDL 43 01/04/2022 03:05 AM    HDL 33 (A) 01/04/2022 03:05 AM    TRIGLYCERIDE 135 01/04/2022 03:05 AM    TROPONINT 38 (H) 01/04/2022 07:39 AM       Recent Labs     01/04/22  0305   NTPROBNP 558*       Cardiac Imaging and Procedures Review  EKG:  My personal interpretation of the EKG dated 1/4/2022 is 100% AVpaced    Echocardiogram: NA    Cardiac Catheterization (1/5/2022):   IMPRESSIONS:  1. Non-coronary source of chest pain  2. Severe native one vessel CAD  3. 1/2 patent bypass grafts  4. Normal LVEDP  5. Incidental note of severe PAD in the R SFA   Findings   Hemodynamics:   Aorta: 138/72 mmHg  LVEDP: 7 mmHg  No significant pullback gradient across the aortic valve     Coronary Anatomy              Left Main: Diffuse 30% stenosis              LAD: Patent proximal stent. The LAD is congenitally of small caliber (1.5-2.0 mm). The first diagonal is grafted and proximal 90% stenosis.               LCx: Co-dominant, proximal 20% , mid 30% stenosis. The OM is large  and has proximal 20% stenosis, the left PLB is normal. The left PDA is small and normal              RCA: Co-dominant, small vessel with ostial 50% stenosis. iFR is 0.96. The mid RCA has 40% stenosis. The PDA branches from the mid right and is normal.                 Grafts              LIMA-diagonal: Patent              VG-unknown - occluded at the anastamosis     Left ventriculography:   LVEF= 55%. Normal wall motion No mitral regurgitation Ectatic ascending aorta     Ascending aorta:  Ectatic. No graft seen  Recommendations:  Further recommendations per the rounding team       Imaging  Chest X-Ray:    Left chest cardiac device.  HEART: Not enlarged. There is atherosclerotic calcification in the aortic arch.  LUNGS: No areas of air space disease are demonstrated.  PLEURA: No effusion or pneumothorax.     IMPRESSION:   1.  No acute cardiac or pulmonary abnormalities are identified.     CT-CTA Chest  IMPRESSION  1.  No CT evidence of pulmonary embolism  2.  Cholelithiasis.  3.  Calcific atherosclerosis.  4.  1 cm mass within each adrenal gland. These could represent adrenal adenomas. Follow-up noncontrast CT of the adrenal glands could be obtained for further evaluation if clinically indicated.    Assessment/Plan  NSTEMI; Hx CAD s/p CABGx2 and stents; HLD  -Clopidogrel  -Antiocoagulant: Heparin gtt  -LHC today showed non-coronary source of chest pain  -Transition to coreg 25 mg BID  -Transition home imdur dose to daily  -Add renexa  -Continue atorvastatin 40 mg daily  -Meds-to-beds on discharge    HTN  -Start chlorthalidone  -Wean off clonidine as OP  -continue lisinopril 40    S/P PPM  -normal fiunction FU OP.     Thank you for allowing me to participate in the care of this patient.  Cardiology will sign off on this patient     Future Appointments   Date Time Provider Department Center   1/13/2022  4:30 PM NORBERTO Peacock. [facility] None   2/25/2022 12:15 PM V EXAM 12 ECHO [facility]   2/25/2022  1:15  PM [facility] EXAM 6 NONINV [facility]         Please contact me with any questions.    NORBERTO Alvarez.   [facility]  [phone]

## 2022-01-05 NOTE — CARE PLAN
The patient is Watcher - Medium risk of patient condition declining or worsening    Shift Goals  Clinical Goals: Stable hemodynamics and BP/controlled pain  Patient Goals: Pain control/Rest    Progress made toward(s) clinical / shift goals:  Patient remained hemodynamically stable overnight.    Patient is not progressing towards the following goals: Patient remains on nitroglycerin gtt.        Problem: Hemodynamics  Goal: Patient's hemodynamics, fluid balance and neurologic status will be stable or improve  Outcome: Progressing     Problem: Fall Risk  Goal: Patient will remain free from falls  Outcome: Progressing     Problem: Pain - Standard  Goal: Alleviation of pain or a reduction in pain to the patient’s comfort goal  Outcome: Progressing     Problem: Pain - Standard  Goal: Alleviation of pain or a reduction in pain to the patient’s comfort goal  Outcome: Progressing     Problem: Knowledge Deficit - Standard  Goal: Patient and family/care givers will demonstrate understanding of plan of care, disease process/condition, diagnostic tests and medications  Outcome: Progressing

## 2022-01-05 NOTE — PROGRESS NOTES
Bedside report received from night shift RN. Assumed patient care. No signs of distress at this time. Tele monitor on and rhythm verified. Safety precautions in place. Call light and personal belongings within reach. Educated patient to use call light if assistance is needed. Will continue to monitor.

## 2022-01-05 NOTE — CARE PLAN
The patient is Watcher - Medium risk of patient condition declining or worsening    Shift Goals  Clinical Goals: control CP, SBP < 160  Patient Goals: pain control, rest    Progress made toward(s) clinical / shift goals:  NTG drip to manage CP, Heparin gtt infusing. Keep SBP < 160. Tolerating PO, good UO voiding to urinal. Pending PCI tomorrow    Patient is not progressing towards the following goals: n/a

## 2022-01-05 NOTE — PROGRESS NOTES
1mL removed from left radial TR band. No signs of bleeding/oozing. Will continue to monitor.     1415 1mL removed from left radial TR band. No signs of bleeding/oozing. Small amount of oozing from right groin site. Gauze half way damp with serosanguineous drainage. Will continue to monitor.     1430 Slight oozing noted under left radial site. Will attempt to continue removal of air in another 15 mins. Small amount of oozing from right groin site. Gauze approximately three quarters damp with serosanguineous drainage. Will continue to monitor.    1445 Slight oozing noted under left radial site. Will attempt to continue removal of air in another ~15-30 mins. Small amount of oozing from right groin site. Gauze unchanged & approximately three quarters damp with serosanguineous drainage. Will continue to monitor.    1530 Slight oozing noted under left radial site. Will attempt to continue removal of air in another 15-30 mins. Small amount of oozing from right groin site. Gauze unchanged & approximately three quarters damp with serosanguineous drainage. Will continue to monitor.    1630  1mL removed from left radial TR band. No signs of bleeding/oozing. Small amount of oozing from right groin site. Gauze unchanged in drainage from previous check, but gauze and tegaderm changed.  Patient HOB raised to 30 degrees. Will recheck radial site for any more air removal from TR band in ~15-30 mins. Will continue to monitor.    1700 1mL removed from left radial TR band. No signs of bleeding/oozing. No new signs of oozing or bleeding from right groin site. Dressing intact. Will continue to monitor.     1720 1mL removed from left radial TR band. No signs of bleeding/oozing. No new signs of oozing or bleeding from right groin site. Dressing intact. Will continue to monitor.     1830 last inflation of TR band removed. Bandaid applied. No signs of bleeding/oozing. No new signs of oozing or bleeding from right groin site. Dressing intact.  Will continue to monitor.

## 2022-01-05 NOTE — THERAPY
Physical therapy Contact Note     Pt going to cath; will follow up post for accurate assessment of dc/PT needs;    Tanvi AMEZCUA, PT,  [phone]

## 2022-01-05 NOTE — DIETARY
"Nutrition services: Day 2 of admit.  Antony Akbar Sr. is a 66 y.o. male with admitting DX of Sepsis, Unstable angina.  Consult received for MST of 2 and Cardia rehab consult panel.      Assessment:  Height: 170.2 cm (5' 7\")  Weight: 84.1 kg (185 lb 6.5 oz) on 1/4 via bed scale. Pt weighed 190 lbs via stand up scale on 1/3. Difference mostly likely related to scale difference.  Body mass index is 29.04 kg/m²., BMI classification: Overweight.   Diet/Intake: Cardiac.    Evaluation:   1. Pt came in for chest pain and went to cath lab for Selective coronary angiography, Left heart catheterization, Bypass graft angiography, Supravalvular aortography, Instantaneous wave free ratio (iFR) (RCA). Per MD note, MD's impression was that the source of chest pain was non-coronary. D/t Pt's chest pain non-coronary, not appropriate to give cardiac education at this time, if anything changes RD available PRN.  2. Spoke w/ Pt at bedside about weight loss and Pt stated that he had loss 5-6 lbs in the last 6 months. Pt reports that UBW is 165 lbs.  3. Per Chart review, Pt's weight history shows that his weight has been stable. Pt weighed 188 lb on 8/4/21 via bed scale.  4. Pt looked normally nourished and had one meal before going NPO for procedure w/ % of meal.  5. Labs: Glucose 253 (H). NS infusion.  6. Meds: SSI. Bowel protocol (not given).  7. Last BM: PTA.     Malnutrition Risk: Does not Meet Criteria at this time.    Recommendations/Plan:  1. Encourage intake of >50%  2. Document intake of all meals as % taken in ADL's to provide interdisciplinary communication across all shifts.   3. Monitor weight.  4. Nutrition rep will continue to see patient for ongoing meal and snack preferences.     RD available PRN.        "

## 2022-01-05 NOTE — PROGRESS NOTES
Hospital Medicine Daily Progress Note    Date of Service  1/5/2022    Chief Complaint  Antony Akbar Sr. is a 66 y.o. male admitted 1/3/2022 with chest pain    Hospital Course  66-year-old male with history of severe CAD with prior history of CABG in 2012 and multiple PCI with 6 stents and current pacemaker placement in [place] presented with recurrent episodes of chest pain. He has been having recurrent episodes of chest pain over the past several weeks however lately the symptoms have been more severe and not relieved by sublingual nitroglycerin.  The patient underwent coronary angiography on 1/5 found with severe native one-vessel coronary disease, 1/2 patent bypass grafts, noncoronary source of chest pain, severe PAD in the right SFA    Interval Problem Update  Patient seen and examined today.    Patient tolerating treatment and therapies.  All Data, Medication data reviewed.  Case discussed with nursing as available.  Plan of Care reviewed with patient and notified of changes.  1/5 the patient with some mild chest pain still on nitro drip this morning, awaiting coronary angiography, denies fevers chills nausea vomiting        I have personally seen and examined the patient at bedside. I discussed the plan of care with patient, bedside RN and cardiology.    Consultants/Specialty  cardiology    Code Status  Full Code    Disposition  Patient is not medically cleared for discharge.   Anticipate discharge to to home with close outpatient follow-up.  I have placed the appropriate orders for post-discharge needs.    Review of Systems  Review of Systems   Constitutional: Negative.  Negative for fever.   HENT: Negative.    Eyes: Negative.    Respiratory: Negative.    Cardiovascular: Positive for chest pain. Negative for palpitations and orthopnea.   Gastrointestinal: Negative.    Genitourinary: Negative.    Musculoskeletal: Negative.    Skin: Negative.    Neurological: Negative.    Endo/Heme/Allergies: Negative.     Psychiatric/Behavioral: The patient is nervous/anxious.    All other systems reviewed and are negative.       Physical Exam  Temp:  [36.7 °C (98 °F)-36.7 °C (98.1 °F)] 36.7 °C (98.1 °F)  Pulse:  [68-97] 70  Resp:  [13-22] 18  BP: (116-195)/() 120/70  SpO2:  [87 %-99 %] 94 %    Physical Exam  Vitals and nursing note reviewed.   Constitutional:       Appearance: He is well-developed. He is not diaphoretic.   HENT:      Head: Normocephalic and atraumatic.      Mouth/Throat:      Pharynx: No oropharyngeal exudate.   Eyes:      General: No scleral icterus.        Right eye: No discharge.         Left eye: No discharge.      Conjunctiva/sclera: Conjunctivae normal.      Pupils: Pupils are equal, round, and reactive to light.   Neck:      Vascular: No JVD.      Trachea: No tracheal deviation.   Cardiovascular:      Rate and Rhythm: Normal rate and regular rhythm.      Heart sounds: No murmur heard.  No friction rub. No gallop.    Pulmonary:      Effort: Pulmonary effort is normal. No respiratory distress.      Breath sounds: Normal breath sounds. No stridor. No wheezing.   Chest:      Chest wall: No tenderness.   Abdominal:      General: Bowel sounds are normal. There is no distension.      Palpations: Abdomen is soft.      Tenderness: There is no abdominal tenderness. There is no rebound.   Musculoskeletal:         General: No tenderness.      Cervical back: Neck supple.   Skin:     General: Skin is warm and dry.      Nails: There is no clubbing.   Neurological:      Mental Status: He is alert and oriented to person, place, and time.      Cranial Nerves: No cranial nerve deficit.      Motor: No abnormal muscle tone.   Psychiatric:         Mood and Affect: Mood is anxious.         Fluids    Intake/Output Summary (Last 24 hours) at 1/5/2022 0740  Last data filed at 1/5/2022 0600  Gross per 24 hour   Intake 2282.23 ml   Output 675 ml   Net 1607.23 ml       Laboratory  Recent Labs     01/03/22  1155 01/04/22  0305  01/05/22  0203   WBC 18.4* 11.0* 11.2*   RBC 5.91 5.00 5.03   HEMOGLOBIN 15.3 12.7* 12.7*   HEMATOCRIT 47.6 40.9* 41.3*   MCV 80.5* 81.8 82.1   MCH 25.9* 25.4* 25.2*   MCHC 32.1* 31.1* 30.8*   RDW 50.5* 52.9* 53.3*   PLATELETCT 218 156* 131*   MPV 10.9 10.6 10.9     Recent Labs     01/03/22  1155 01/04/22  0305 01/05/22  0203   SODIUM 143 143 135   POTASSIUM 3.6 3.6 4.0   CHLORIDE 105 107 99   CO2 21 22 20   GLUCOSE 76 93 254*   BUN 10 8 9   CREATININE 0.84 0.86 0.88   CALCIUM 9.5 8.6 8.3*     Recent Labs     01/04/22  0305 01/04/22  1217   APTT  --  26.9   INR 1.06 1.04         Recent Labs     01/04/22  0305   TRIGLYCERIDE 135   HDL 33*   LDL 43       Imaging  CT-CTA CHEST PULMONARY ARTERY W/ RECONS   Final Result      1.  No CT evidence of pulmonary embolism.      2.  Cholelithiasis.      3.  Calcific atherosclerosis.      4.  1 cm mass within each adrenal gland. These could represent adrenal adenomas. Follow-up noncontrast CT of the adrenal glands could be obtained for further evaluation if clinically indicated.            DX-CHEST-PORTABLE (1 VIEW)   Final Result   Addendum 1 of 1   Signed      Final      1.  No acute cardiac or pulmonary abnormalities are identified.      CL-LEFT HEART CATHETERIZATION WITH POSSIBLE INTERVENTION    (Results Pending)        Assessment/Plan  * Unstable angina (HCC)- (present on admission)  Assessment & Plan  S/p coronary angiography  Ongoing maximize medical therapy  Close cardiology follow-up  CTA reviewed was negative for PE    Electrolyte abnormality  Assessment & Plan  Replete potassium and magnesium and monitor levels    Adrenal mass (HCC)  Assessment & Plan  Will need outpatient follow-up    HLD (hyperlipidemia)  Assessment & Plan  Continue atorvastatin    Anxiety- (present on admission)  Assessment & Plan  Continue Zoloft  Add lorazepam as needed    Hypertensive urgency- (present on admission)  Assessment & Plan  Optimize medical therapy to goal pressures  Continue current  medical therapy    Sepsis (HCC)- (present on admission)  Assessment & Plan  Ruled out likely  SIRS secondary to NSTEMI     patient has no clear source of infection and clinically does not appear to be septic his leukocytosis is likely reactive  Will DC antibiotics and monitor          CAD (coronary artery disease) of artery bypass graft- (present on admission)  Assessment & Plan  Prior bypass grafting,  Current angiography does not indicate flow-limiting active disease to explain his chest pain  Medical management per cardiology and close outpatient follow-up  Antianginal regimen    Primary hypertension- (present on admission)  Assessment & Plan  With hypertensive urgency initially  Optimize medical therapy in conjunction with antianginals  Continue Imdur lisinopril metoprolol and clonidine  Monitor blood pressure adjust accordingly    COPD (chronic obstructive pulmonary disease) (HCC)- (present on admission)  Assessment & Plan  No acute exacerbation  Albuterol as needed    Hypothyroidism- (present on admission)  Assessment & Plan  Continue levothyroxine     Plan  Maximize medical therapy for the patient's coronary disease and hypertension  Monitor post angiogram  See orders  Medically complex high risk patient      VTE prophylaxis: Lovenox DVT prophylaxis    I have performed a physical exam and reviewed and updated ROS and Plan today (1/5/2022). In review of yesterday's note (1/4/2022), there are no changes except as documented above.      Please note that this dictation was created using voice recognition software. I have made every reasonable attempt to correct obvious errors, but I expect that there are errors of grammar and possibly context that I did not discover before finalizing the note.

## 2022-01-05 NOTE — PROGRESS NOTES
Received report from Shona, cath lab RN. In report stated Dr. Daniel was to d.c the heparin and nitroglycerine drips. Patient returned from cath lab. Right groin site dressed with gauze and tegaderm, small amount of serosanguinous drainage present, Per cath lab RN, drainage has not increased. Left radial site is closed with a snuff box TR band, inflated to 5mL, Pulses are palpable without signs of hematoma. Will continue to monitor.     1145 Reached out to ARNULFO Nagel to inform her medications have not been deleted from MAR, asked if she could reach out to clarify the orders for d/c.

## 2022-01-06 ENCOUNTER — PHARMACY VISIT (OUTPATIENT)
Dept: PHARMACY | Facility: MEDICAL CENTER | Age: 67
End: 2022-01-06
Payer: COMMERCIAL

## 2022-01-06 VITALS
SYSTOLIC BLOOD PRESSURE: 124 MMHG | RESPIRATION RATE: 17 BRPM | TEMPERATURE: 97.9 F | OXYGEN SATURATION: 94 % | DIASTOLIC BLOOD PRESSURE: 72 MMHG | BODY MASS INDEX: 28.37 KG/M2 | WEIGHT: 180.78 LBS | HEART RATE: 71 BPM | HEIGHT: 67 IN

## 2022-01-06 LAB
ANION GAP SERPL CALC-SCNC: 13 MMOL/L (ref 7–16)
BUN SERPL-MCNC: 8 MG/DL (ref 8–22)
CALCIUM SERPL-MCNC: 8.9 MG/DL (ref 8.5–10.5)
CHLORIDE SERPL-SCNC: 104 MMOL/L (ref 96–112)
CO2 SERPL-SCNC: 20 MMOL/L (ref 20–33)
CREAT SERPL-MCNC: 0.88 MG/DL (ref 0.5–1.4)
GLUCOSE BLD-MCNC: 115 MG/DL (ref 65–99)
GLUCOSE BLD-MCNC: 67 MG/DL (ref 65–99)
GLUCOSE SERPL-MCNC: 71 MG/DL (ref 65–99)
POTASSIUM SERPL-SCNC: 4 MMOL/L (ref 3.6–5.5)
SODIUM SERPL-SCNC: 137 MMOL/L (ref 135–145)

## 2022-01-06 PROCEDURE — RXMED WILLOW AMBULATORY MEDICATION CHARGE: Performed by: HOSPITALIST

## 2022-01-06 PROCEDURE — A9270 NON-COVERED ITEM OR SERVICE: HCPCS | Performed by: STUDENT IN AN ORGANIZED HEALTH CARE EDUCATION/TRAINING PROGRAM

## 2022-01-06 PROCEDURE — 700102 HCHG RX REV CODE 250 W/ 637 OVERRIDE(OP): Performed by: INTERNAL MEDICINE

## 2022-01-06 PROCEDURE — A9270 NON-COVERED ITEM OR SERVICE: HCPCS | Performed by: HOSPITALIST

## 2022-01-06 PROCEDURE — A9270 NON-COVERED ITEM OR SERVICE: HCPCS | Performed by: NURSE PRACTITIONER

## 2022-01-06 PROCEDURE — 80048 BASIC METABOLIC PNL TOTAL CA: CPT

## 2022-01-06 PROCEDURE — 700102 HCHG RX REV CODE 250 W/ 637 OVERRIDE(OP): Performed by: STUDENT IN AN ORGANIZED HEALTH CARE EDUCATION/TRAINING PROGRAM

## 2022-01-06 PROCEDURE — 700102 HCHG RX REV CODE 250 W/ 637 OVERRIDE(OP): Performed by: NURSE PRACTITIONER

## 2022-01-06 PROCEDURE — 99239 HOSP IP/OBS DSCHRG MGMT >30: CPT | Performed by: HOSPITALIST

## 2022-01-06 PROCEDURE — 700102 HCHG RX REV CODE 250 W/ 637 OVERRIDE(OP): Performed by: HOSPITALIST

## 2022-01-06 PROCEDURE — 82962 GLUCOSE BLOOD TEST: CPT | Mod: 91

## 2022-01-06 PROCEDURE — A9270 NON-COVERED ITEM OR SERVICE: HCPCS | Performed by: INTERNAL MEDICINE

## 2022-01-06 RX ORDER — CARVEDILOL 25 MG/1
25 TABLET ORAL 2 TIMES DAILY WITH MEALS
Qty: 60 TABLET | Refills: 4 | Status: SHIPPED | OUTPATIENT
Start: 2022-01-06 | End: 2022-01-07 | Stop reason: SDUPTHER

## 2022-01-06 RX ORDER — CHLORTHALIDONE 25 MG/1
25 TABLET ORAL DAILY
Qty: 30 TABLET | Refills: 6 | Status: SHIPPED | OUTPATIENT
Start: 2022-01-07 | End: 2022-01-07 | Stop reason: SDUPTHER

## 2022-01-06 RX ORDER — RANOLAZINE 500 MG/1
500 TABLET, EXTENDED RELEASE ORAL 2 TIMES DAILY
Qty: 60 TABLET | Refills: 3 | Status: SHIPPED | OUTPATIENT
Start: 2022-01-06 | End: 2022-01-07 | Stop reason: SDUPTHER

## 2022-01-06 RX ORDER — ASPIRIN 81 MG/1
81 TABLET ORAL DAILY
Qty: 100 TABLET | Refills: 1 | Status: SHIPPED | OUTPATIENT
Start: 2022-01-07 | End: 2023-09-19

## 2022-01-06 RX ADMIN — ATORVASTATIN CALCIUM 40 MG: 40 TABLET, FILM COATED ORAL at 05:43

## 2022-01-06 RX ADMIN — RANOLAZINE 500 MG: 500 TABLET, EXTENDED RELEASE ORAL at 05:44

## 2022-01-06 RX ADMIN — GABAPENTIN 100 MG: 100 CAPSULE ORAL at 08:26

## 2022-01-06 RX ADMIN — LEVOTHYROXINE SODIUM 50 MCG: 0.05 TABLET ORAL at 05:44

## 2022-01-06 RX ADMIN — ASPIRIN 81 MG: 81 TABLET, COATED ORAL at 05:44

## 2022-01-06 RX ADMIN — CHLORTHALIDONE 25 MG: 25 TABLET ORAL at 05:44

## 2022-01-06 RX ADMIN — MORPHINE SULFATE 15 MG: 15 TABLET, FILM COATED, EXTENDED RELEASE ORAL at 01:27

## 2022-01-06 RX ADMIN — CYCLOBENZAPRINE 10 MG: 10 TABLET, FILM COATED ORAL at 05:43

## 2022-01-06 RX ADMIN — LISINOPRIL 40 MG: 20 TABLET ORAL at 05:44

## 2022-01-06 RX ADMIN — CLONIDINE HYDROCHLORIDE 0.1 MG: 0.1 TABLET ORAL at 05:44

## 2022-01-06 RX ADMIN — CARVEDILOL 25 MG: 25 TABLET, FILM COATED ORAL at 08:26

## 2022-01-06 RX ADMIN — CLOPIDOGREL BISULFATE 75 MG: 75 TABLET ORAL at 05:43

## 2022-01-06 RX ADMIN — ISOSORBIDE MONONITRATE 120 MG: 60 TABLET, EXTENDED RELEASE ORAL at 05:44

## 2022-01-06 RX ADMIN — DOCUSATE SODIUM 50 MG AND SENNOSIDES 8.6 MG 2 TABLET: 8.6; 5 TABLET, FILM COATED ORAL at 05:43

## 2022-01-06 ASSESSMENT — FIBROSIS 4 INDEX: FIB4 SCORE: 2.42

## 2022-01-06 ASSESSMENT — PAIN DESCRIPTION - PAIN TYPE
TYPE: CHRONIC PAIN

## 2022-01-06 NOTE — DISCHARGE SUMMARY
Discharge Summary    CHIEF COMPLAINT ON ADMISSION  Chief Complaint   Patient presents with   • Chest Pain     Pt reports awakening this morning with mid-left 9/10 chest pain that does not radiate. Pt reports being diaphoretic. Pt has a cardiac history of stents and a medtronic pacemaker placed and a double bypass surgery.        Reason for Admission  Angina    Admission Date  1/3/2022    CODE STATUS  Full Code    HPI & HOSPITAL COURSE  This is a 66 y.o. male here with angina, has a history of coronary artery disease with multiple stents reportedly 6, history of pacemaker placement in 2008, generator change in 2021, two-vessel bypass in 2012 at [facility] in [place], presented with chest pain and hypertensive urgency.  The patient does have a history of dyslipidemia and COPD.  The patient's pain pattern recently has increased and therefore the patient considered for unstable angina.  The patient was admitted to the intermediate care unit and placed on a heparin and nitroglycerin drip, the patient was then taken to the angiography suite where the patient was found to have noncoronary source of chest pain, severe native one-vessel coronary artery disease with 1 out of 2 patent bypass grafts, incidentally the patient was found with severe peripheral arterial disease and right SFA, for detailed report please refer to the cardiac catheterization report by Dr. Daniel.  The patient was maximized on antianginal medication and blood pressure management.  Currently tolerating this regimen well with controlled blood pressure and no further chest pain.  From a cardiology appointment the patient was cleared to discharge and to follow-up closely as an outpatient.  His new and changed medication regiment was brought to the bedside for discharge and the patient was given appointments for a new primary care provider as well as follow-up with cardiology.  The patient's chemistry today was found unremarkable and the  patient this time is discharged in a improved condition for close outpatient follow-up      Therefore, he is discharged in fair and stable condition to home with close outpatient follow-up.    The patient met 2-midnight criteria for an inpatient stay at the time of discharge.    Discharge Date  1/6/2022    FOLLOW UP ITEMS POST DISCHARGE  Close cardiology follow-up and further GDMT    DISCHARGE DIAGNOSES  Principal Problem:    Unstable angina (HCC) POA: Yes  Active Problems:    Hypothyroidism POA: Yes    COPD (chronic obstructive pulmonary disease) (HCC) POA: Yes    Primary hypertension POA: Yes    CAD (coronary artery disease) of artery bypass graft POA: Yes    Sepsis (HCC) POA: Yes    Hypertensive urgency POA: Yes    Anxiety POA: Yes    HLD (hyperlipidemia) POA: Unknown    Adrenal mass (HCC) POA: Unknown    Electrolyte abnormality POA: Unknown  Resolved Problems:    * No resolved hospital problems. *      FOLLOW UP  Future Appointments   Date Time Provider Department Center   1/13/2022  4:30 PM DIPIKA Peacock [facility] None   2/25/2022 12:15 PM [facility] EXAM 12 ECHO [facility]   2/25/2022  1:15 PM [facility] EXAM 6 NONINV [facility]     Suman Hope D.O.  [address] [place] [id number]  [phone]    Schedule an appointment as soon as possible for a visit in 1 week        MEDICATIONS ON DISCHARGE     Medication List      START taking these medications      Instructions   Aspirin Adult Low Strength 81 MG EC tablet  Start taking on: January 7, 2022  Generic drug: aspirin   Take 1 Tablet by mouth every day.  Dose: 81 mg     carvedilol 25 MG Tabs  Commonly known as: COREG   Take 1 Tablet by mouth 2 times a day with meals.  Dose: 25 mg     chlorthalidone 25 MG Tabs  Start taking on: January 7, 2022  Commonly known as: HYGROTON   Take 1 Tablet by mouth every day.  Dose: 25 mg     Ranexa 500 MG Tb12  Generic drug: ranolazine   Take 1 Tablet by mouth 2 times a day.  Dose: 500 mg        CONTINUE taking  these medications      Instructions   albuterol 108 (90 Base) MCG/ACT Aers inhalation aerosol   Inhale 2 Puffs every 6 hours as needed for Shortness of Breath.  Dose: 2 Puff     atorvastatin 40 MG Tabs  Commonly known as: LIPITOR   Take 1 Tablet by mouth every day.  Dose: 40 mg     cloNIDine 0.1 MG Tabs  Commonly known as: CATAPRES   Take 1 Tablet by mouth every morning.  Dose: 0.1 mg     clopidogrel 75 MG Tabs  Commonly known as: PLAVIX   Take 1 Tablet by mouth every morning.  Dose: 75 mg     cyclobenzaprine 10 mg Tabs  Commonly known as: Flexeril   Take 1 Tablet by mouth 3 times a day as needed for Moderate Pain.  Dose: 10 mg     EpiPen 2-Jason 0.3 MG/0.3ML Soaj solution for injection  Generic drug: EPINEPHrine   Inject 0.3 mg into the shoulder, thigh, or buttocks as needed.  Dose: 0.3 mg     famotidine 40 MG Tabs  Commonly known as: PEPCID   Take 1 Tablet by mouth every evening.  Dose: 40 mg     gabapentin 100 MG Caps  Commonly known as: NEURONTIN   Take 100 mg by mouth 4 times a day.  Dose: 100 mg     isosorbide mononitrate SR 60 MG Tb24  Commonly known as: IMDUR   Take 1 Tablet by mouth 2 times a day.  Dose: 60 mg     levothyroxine 50 MCG Tabs  Commonly known as: SYNTHROID   Take 50 mcg by mouth every morning on an empty stomach.  Dose: 50 mcg     lisinopril 40 MG tablet  Commonly known as: PRINIVIL   Take 1 Tablet by mouth every day.  Dose: 40 mg     morphine ER 15 MG Tbcr tablet  Commonly known as: MS CONTIN   Take 15 mg by mouth 3 times a day as needed (Pain).  Dose: 15 mg     nitroglycerin 0.4 MG Subl  Commonly known as: NITROSTAT   Place 1 Tablet under the tongue 1 time a day as needed for Chest Pain (chest pain).  Dose: 0.4 mg     sertraline 100 MG Tabs  Commonly known as: Zoloft   Take 100 mg by mouth every evening.  Dose: 100 mg     tamsulosin 0.4 MG capsule  Commonly known as: FLOMAX   Take 1 Capsule by mouth every evening.  Dose: 0.4 mg     vitamin D3 1000 Unit (25 mcg) Tabs  Commonly known as:  cholecalciferol   Take 1,000 Units by mouth every day.  Dose: 1,000 Units        STOP taking these medications    metoprolol SR 50 MG Tb24  Commonly known as: TOPROL XL            Allergies  Allergies   Allergen Reactions   • Bee Venom Anaphylaxis       DIET  Orders Placed This Encounter   Procedures   • Diet Order Diet: Cardiac     Standing Status:   Standing     Number of Occurrences:   1     Order Specific Question:   Diet:     Answer:   Cardiac [6]       ACTIVITY  As tolerated.  Weight bearing as tolerated  Low level activity until seen by cardiology and cleared for more strenuous activity    CONSULTATIONS  Cardiology    PROCEDURES  CARDIAC CATHETERIZATION REPORT     REFERRING: French Herrera MD     PROCEDURE PHYSICIAN: Sergey Daniel MD, [facility], [facility]  ASSISTANT: None     IMPRESSIONS:  1. Non-coronary source of chest pain  2. Severe native one vessel CAD  3. 1/2 patent bypass grafts  4. Normal LVEDP  5. Incidental note of severe PAD in the R SFA     Recommendations:  Further recommendations per the rounding team     Pre-procedure diagnosis: Unstable angina  Post-procedure diagnosis: non-coronary chest symptoms     Procedure performed  Selective coronary angiography  Left heart catheterization  Bypass graft angiography  Supravalvular aortography  Instantaneous wave free ratio (iFR) (RCA)     Conscious sedation was supervised by myself and administered by trained personnel using fentanyl and versed between 1004 and 1113. The patient tolerated sedation without complication.      Procedure Description  1. Access: 5/6 Green[place]ic left distal radial artery Micropuncture technique was utilized following local anesthesia with lidocaine.  A radial cocktail containing verapamil and saline was administered in the radial artery sheath. During the procedure the patient moved the left arm and contaminated the left wrist. I then placed a second catheter in the R femoral artery with micropuncture and dynamic US guidance     2.  "Diagnostic description: The catheter was passed to the central circulation with the aide of J tipped 0.35\" wire. A 6F JR4, 6F JL3.5, 6F Pigtail and 5.2F Bartorelli were used to inject the coronary circulation, bypass grafts and inject the left ventricle during invasive hemodynamic monitoring and inject the ascending aorta.      3. Closing: At completion of the procedure the relevant equipment was removed from the body and hemostasis achieved by Radial band and Perclose     Findings   Hemodynamics:   Aorta: 138/72 mmHg  LVEDP: 7 mmHg  No significant pullback gradient across the aortic valve     Coronary Anatomy              Left Main: Diffuse 30% stenosis              LAD: Patent proximal stent. The LAD is congenitally of small caliber (1.5-2.0 mm). The first diagonal is grafted and proximal 90% stenosis.               LCx: Co-dominant, proximal 20% , mid 30% stenosis. The OM is large and has proximal 20% stenosis, the left PLB is normal. The left PDA is small and normal              RCA: Co-dominant, small vessel with ostial 50% stenosis. iFR is 0.96. The mid RCA has 40% stenosis. The PDA branches from the mid right and is normal.                 Grafts              LIMA-diagonal: Patent              VG-unknown - occluded at the anastamosis     Left ventriculography:   LVEF= 55%. Normal wall motion No mitral regurgitation Ectatic ascending aorta     Ascending aorta:  Ectatic. No graft seen     Technical Factors  1. Complications: None  2. Estimated Blood Loss: <50 cc  3. Specimens: None  4. Contrast Volume: 120 ml  5. Medications: Radial cocktail (Verapamil 2.5 mg, Nitroglycerin 100 mcg) Heparin to maintain ACT >250  6. Radiation (air kerma): 376 mGy          LABORATORY  Lab Results   Component Value Date    SODIUM 137 01/06/2022    POTASSIUM 4.0 01/06/2022    CHLORIDE 104 01/06/2022    CO2 20 01/06/2022    GLUCOSE 71 01/06/2022    BUN 8 01/06/2022    CREATININE 0.88 01/06/2022        Lab Results   Component Value " Date    WBC 11.2 (H) 01/05/2022    HEMOGLOBIN 12.7 (L) 01/05/2022    HEMATOCRIT 41.3 (L) 01/05/2022    PLATELETCT 131 (L) 01/05/2022        Total time of the discharge process exceeds 45 minutes.

## 2022-01-06 NOTE — DISCHARGE PLANNING
Meds-to-Beds: Discharge prescription orders listed below delivered to patient in discharge lounge. RN notified. Patient counseled. Ranexa requires prior authorization,  Senthil authorized approved services for the 32 tablets in stock and will initiate prior authorization. Patient elected to have co-payment billed to patient account.       Current Outpatient Medications   Medication Sig Dispense Refill   • [START ON 1/7/2022] aspirin 81 MG EC tablet Take 1 Tablet by mouth every day. 100 Tablet 1   • carvedilol (COREG) 25 MG Tab Take 1 Tablet by mouth 2 times a day with meals. 60 Tablet 4   • [START ON 1/7/2022] chlorthalidone (HYGROTON) 25 MG Tab Take 1 Tablet by mouth every day. 30 Tablet 6   • ranolazine (RANEXA) 500 MG TABLET SR 12 HR Take 1 Tablet by mouth 2 times a day. 60 Tablet 3      Shelia Venegas, PharmD

## 2022-01-06 NOTE — PROGRESS NOTES
Arrive to DC lounge via wheelchair. A/O, dc instructions reviewed w/ pt, verbalized understanding. Waiting for meds to bed delivery.

## 2022-01-06 NOTE — DISCHARGE INSTRUCTIONS
Discharge Instructions    Discharged to home by car with relative. Discharged via wheelchair, hospital escort: Yes.  Special equipment needed: Not Applicable    Be sure to schedule a follow-up appointment with your primary care doctor or any specialists as instructed.     Discharge Plan:   Diet Plan: Discussed  Activity Level: Discussed  Confirmed Follow up Appointment: Patient to Call and Schedule Appointment  Confirmed Symptoms Management: Discussed  Medication Reconciliation Updated: Yes  Influenza Vaccine Indication: Patient Refuses    I understand that a diet low in cholesterol, fat, and sodium is recommended for good health. Unless I have been given specific instructions below for another diet, I accept this instruction as my diet prescription.   Other diet:     Special Instructions: None    · Is patient discharged on Warfarin / Coumadin?   No     Depression / Suicide Risk    As you are discharged from this [facility] facility, it is important to learn how to keep safe from harming yourself.    Recognize the warning signs:  · Abrupt changes in personality, positive or negative- including increase in energy   · Giving away possessions  · Change in eating patterns- significant weight changes-  positive or negative  · Change in sleeping patterns- unable to sleep or sleeping all the time   · Unwillingness or inability to communicate  · Depression  · Unusual sadness, discouragement and loneliness  · Talk of wanting to die  · Neglect of personal appearance   · Rebelliousness- reckless behavior  · Withdrawal from people/activities they love  · Confusion- inability to concentrate     If you or a loved one observes any of these behaviors or has concerns about self-harm, here's what you can do:  · Talk about it- your feelings and reasons for harming yourself  · Remove any means that you might use to hurt yourself (examples: pills, rope, extension cords, firearm)  · Get professional help from the community (Mental  Health, Substance Abuse, psychological counseling)  · Do not be alone:Call your Safe Contact- someone whom you trust who will be there for you.  · Call your local CRISIS HOTLINE 882-2462 or 727-013-5318  · Call your local Children's Mobile Crisis Response Team [place] (213) 656-7212 or www.[employer]  · Call the toll free National Suicide Prevention Hotlines   · National Suicide Prevention Lifeline 063-370-DEVW (4212)  · National Hope Line Network 800-SUICIDE (698-4592)    Physician Discharge Instructions:  Discharge Diagnosis: Uncontrolled Hypertension, CAD  Proceed to discharge/transfer  to Home  Take Rx/Prescriptions as prescribed and reconciled per AVS  For OTC Medication consult with your Pharmacist first.  Diet: Cardiac  Activity: Low level until seen by Cardiology  F/u with PCP within 3-10 days at home location, ask for f/u prescriptions by PCP, need new PCP  F/u with Specialty MD: Cardiology  For new, severe symptoms refer to the next Emergency Care or call your Physician.  Controlled Substance History was reviewed if new Rx was issued.    John Daniel MD

## 2022-01-07 ENCOUNTER — TELEPHONE (OUTPATIENT)
Dept: CARDIOLOGY | Facility: MEDICAL CENTER | Age: 67
End: 2022-01-07

## 2022-01-07 DIAGNOSIS — I25.708 CORONARY ARTERY DISEASE INVOLVING CORONARY BYPASS GRAFT WITH OTHER FORMS OF ANGINA PECTORIS, UNSPECIFIED WHETHER NATIVE OR TRANSPLANTED HEART (HCC): ICD-10-CM

## 2022-01-07 RX ORDER — CHLORTHALIDONE 25 MG/1
25 TABLET ORAL DAILY
Qty: 90 TABLET | Refills: 3 | Status: SHIPPED | OUTPATIENT
Start: 2022-01-07 | End: 2022-03-07 | Stop reason: SDUPTHER

## 2022-01-07 RX ORDER — CARVEDILOL 25 MG/1
25 TABLET ORAL 2 TIMES DAILY WITH MEALS
Qty: 180 TABLET | Refills: 3 | Status: SHIPPED | OUTPATIENT
Start: 2022-01-07 | End: 2023-01-20

## 2022-01-07 RX ORDER — CLONIDINE HYDROCHLORIDE 0.1 MG/1
0.1 TABLET ORAL EVERY MORNING
Qty: 90 TABLET | Refills: 3 | Status: SHIPPED | OUTPATIENT
Start: 2022-01-07 | End: 2022-02-08

## 2022-01-07 RX ORDER — LISINOPRIL 40 MG/1
40 TABLET ORAL DAILY
Qty: 90 TABLET | Refills: 3 | Status: SHIPPED | OUTPATIENT
Start: 2022-01-07 | End: 2022-11-22 | Stop reason: SDUPTHER

## 2022-01-07 RX ORDER — RANOLAZINE 500 MG/1
500 TABLET, EXTENDED RELEASE ORAL 2 TIMES DAILY
Qty: 180 TABLET | Refills: 3 | Status: SHIPPED | OUTPATIENT
Start: 2022-01-07 | End: 2022-02-01

## 2022-01-07 NOTE — TELEPHONE ENCOUNTER
Returned call and s/w pt's wife. She thinks that VR would like to see pt in person for an appt rather than doing a telemedicine appt. Offered KEATON appts, but she only wants to see VR himself. Pt has his echo and US scheduled on 2/25. Scheduled with VR at  location on 3/7. She states that pt is feeling better since his hospitalization and starting new meds. Advised that if this changes it may be necessary to schedule for a sooner appt with KEATON. Rxs sent for the medications she requests.

## 2022-01-07 NOTE — TELEPHONE ENCOUNTER
VR    Good Morning,     Patient was scheduled by consulting physician during recent hospital stay for 01/13 at 4:30pm.    They had to cancel as they live in [place] and are unable to make that time and only wanted Dr. Laurent.  His next available in office or Telemed is 03/21/22.    Patient is scheduled with his PCP 01/12/22.    Please advise patient if they need to be seen sooner otherwise they will wait to schedule until testing on 02/25/22 is completed.     They had questions about the medication changes made during that stay.   Wife states new medications seem to work well and wanted to make sure there were refills sent to: [facility]   Med:  Chlorthalidone  25mg  Ranexa 500mg  Carvedilil 25mg  Clonidine .1mg      Thank you

## 2022-01-08 LAB
BACTERIA BLD CULT: NORMAL
BACTERIA BLD CULT: NORMAL
SIGNIFICANT IND 70042: NORMAL
SIGNIFICANT IND 70042: NORMAL
SITE SITE: NORMAL
SITE SITE: NORMAL
SOURCE SOURCE: NORMAL
SOURCE SOURCE: NORMAL

## 2022-01-28 ENCOUNTER — TELEPHONE (OUTPATIENT)
Dept: SCHEDULING | Facility: IMAGING CENTER | Age: 67
End: 2022-01-28

## 2022-01-29 NOTE — TELEPHONE ENCOUNTER
VR    Good afternoon,     Patient was just discharged from [facility] for Angina    While in hospital was told kidneys not funtioning properly.  Patient is on medication:   Ranolazine(Ranexa) 500mg SR 12HR    Wife states he has previous occurrence of declining kidney function on this medication. They want to know if they should stop the medication with current kidney function issues.     Please reach out to the patient to advise today if possible or at the earliest convinience tomorrow.     Thank you

## 2022-02-08 ENCOUNTER — TELEPHONE (OUTPATIENT)
Dept: CARDIOLOGY | Facility: MEDICAL CENTER | Age: 67
End: 2022-02-08

## 2022-02-08 DIAGNOSIS — I10 PRIMARY HYPERTENSION: ICD-10-CM

## 2022-02-08 RX ORDER — CLONIDINE HYDROCHLORIDE 0.1 MG/1
0.1 TABLET ORAL
Qty: 30 TABLET | Refills: 0 | Status: SHIPPED | OUTPATIENT
Start: 2022-02-08 | End: 2022-03-07

## 2022-02-08 NOTE — TELEPHONE ENCOUNTER
Called pt [phone]  S/w pt and [place]. Pt reports low BP last nite.   NOW: /94 HR 84  ASA, Lipitor, Hygroton, Clonidine, Imdur, Lisinopril, Morphine, Nitro, Flomas, Plavix verified with name/dose/frequecy.  Pt reports taking Corge 12.5 mg BID instead of 25mg BID.  Explained that dehydration can contribute to low BP and recommend intake of 8-8oz H2O daily. Pt admits to not hydrating adequately during the day.         To RV for recommendations

## 2022-02-08 NOTE — TELEPHONE ENCOUNTER
VR    Patient's  Virginia called, she stated patient was having angina. Last night his blood pressure was 87/56. Yesterday in the morning 138/99. His pulse is also very inconsistent even though he has a pace maker. Patient is requesting someone go over his medication and see if anything is causing this.

## 2022-02-08 NOTE — TELEPHONE ENCOUNTER
[place] called back.  Pt in agreement with RV recommendations. Ok to updated pt MAR to reflect clonidine as PRN instead of daily medication. Pt in agreement to keep BP/HR logs for provider review. All questions answered. Pt verbalized understanding and is appreciative of call.

## 2022-02-08 NOTE — TELEPHONE ENCOUNTER
Claudio Laurent M.D.  You 28 minutes ago (2:45 PM)         Can change clonidine to as needed for BP over 140/90 instead of daily. Allow 3-5 days then attempt to increase carvedilol back to 25mg bid. Continue to keep track of BP and heart rate. Thank you Nena!    Message text      ---------------------------------  Called pt [phone]  No answer, left VM to call [phone]  Regarding RV recommendations

## 2022-02-25 ENCOUNTER — HOSPITAL ENCOUNTER (OUTPATIENT)
Dept: CARDIOLOGY | Facility: MEDICAL CENTER | Age: 67
End: 2022-02-25
Attending: INTERNAL MEDICINE
Payer: MEDICARE

## 2022-02-25 ENCOUNTER — HOSPITAL ENCOUNTER (OUTPATIENT)
Dept: RADIOLOGY | Facility: MEDICAL CENTER | Age: 67
End: 2022-02-25
Attending: INTERNAL MEDICINE
Payer: MEDICARE

## 2022-02-25 DIAGNOSIS — I71.9 AORTIC ANEURYSM WITHOUT RUPTURE, UNSPECIFIED PORTION OF AORTA (HCC): ICD-10-CM

## 2022-02-25 DIAGNOSIS — I20.9 ANGINA PECTORIS (HCC): ICD-10-CM

## 2022-02-25 LAB
LV EJECT FRACT  99904: 55
LV EJECT FRACT MOD 2C 99903: 35.54
LV EJECT FRACT MOD 4C 99902: 55.69
LV EJECT FRACT MOD BP 99901: 46.75

## 2022-02-25 PROCEDURE — 93978 VASCULAR STUDY: CPT | Mod: 26,GZ | Performed by: INTERNAL MEDICINE

## 2022-02-25 PROCEDURE — 93306 TTE W/DOPPLER COMPLETE: CPT

## 2022-02-25 PROCEDURE — 93978 VASCULAR STUDY: CPT

## 2022-02-25 PROCEDURE — 93306 TTE W/DOPPLER COMPLETE: CPT | Mod: 26 | Performed by: INTERNAL MEDICINE

## 2022-03-07 ENCOUNTER — NON-PROVIDER VISIT (OUTPATIENT)
Dept: CARDIOLOGY | Facility: MEDICAL CENTER | Age: 67
End: 2022-03-07

## 2022-03-07 ENCOUNTER — APPOINTMENT (OUTPATIENT)
Dept: CARDIOLOGY | Facility: MEDICAL CENTER | Age: 67
End: 2022-03-07
Payer: MEDICARE

## 2022-03-07 ENCOUNTER — OFFICE VISIT (OUTPATIENT)
Dept: CARDIOLOGY | Facility: MEDICAL CENTER | Age: 67
End: 2022-03-07

## 2022-03-07 VITALS
DIASTOLIC BLOOD PRESSURE: 68 MMHG | WEIGHT: 178 LBS | OXYGEN SATURATION: 92 % | HEIGHT: 67 IN | BODY MASS INDEX: 27.94 KG/M2 | RESPIRATION RATE: 14 BRPM | SYSTOLIC BLOOD PRESSURE: 106 MMHG | HEART RATE: 81 BPM

## 2022-03-07 VITALS
HEART RATE: 80 BPM | RESPIRATION RATE: 14 BRPM | WEIGHT: 178 LBS | HEIGHT: 67 IN | OXYGEN SATURATION: 90 % | BODY MASS INDEX: 27.94 KG/M2 | DIASTOLIC BLOOD PRESSURE: 66 MMHG | SYSTOLIC BLOOD PRESSURE: 104 MMHG

## 2022-03-07 DIAGNOSIS — I20.9 ANGINA PECTORIS (HCC): ICD-10-CM

## 2022-03-07 DIAGNOSIS — Z95.0 CARDIAC PACEMAKER IN SITU: ICD-10-CM

## 2022-03-07 DIAGNOSIS — I49.5 SICK SINUS SYNDROME (HCC): ICD-10-CM

## 2022-03-07 DIAGNOSIS — I73.9 PAD (PERIPHERAL ARTERY DISEASE) (HCC): ICD-10-CM

## 2022-03-07 DIAGNOSIS — I25.708 CORONARY ARTERY DISEASE INVOLVING CORONARY BYPASS GRAFT WITH OTHER FORMS OF ANGINA PECTORIS, UNSPECIFIED WHETHER NATIVE OR TRANSPLANTED HEART (HCC): ICD-10-CM

## 2022-03-07 DIAGNOSIS — E78.5 HYPERLIPIDEMIA, UNSPECIFIED HYPERLIPIDEMIA TYPE: ICD-10-CM

## 2022-03-07 DIAGNOSIS — I10 PRIMARY HYPERTENSION: ICD-10-CM

## 2022-03-07 PROBLEM — I16.0 HYPERTENSIVE URGENCY: Status: RESOLVED | Noted: 2022-01-03 | Resolved: 2022-03-07

## 2022-03-07 PROBLEM — I71.9 AORTIC ANEURYSM WITHOUT RUPTURE (HCC): Status: RESOLVED | Noted: 2021-12-29 | Resolved: 2022-03-07

## 2022-03-07 PROCEDURE — 99215 OFFICE O/P EST HI 40 MIN: CPT | Performed by: INTERNAL MEDICINE

## 2022-03-07 PROCEDURE — 93280 PM DEVICE PROGR EVAL DUAL: CPT | Performed by: NURSE PRACTITIONER

## 2022-03-07 RX ORDER — CHLORTHALIDONE 25 MG/1
25 TABLET ORAL
Qty: 30 TABLET | Refills: 3 | Status: SHIPPED | OUTPATIENT
Start: 2022-03-07 | End: 2022-11-22

## 2022-03-07 RX ORDER — RANOLAZINE 500 MG/1
500 TABLET, EXTENDED RELEASE ORAL 2 TIMES DAILY
Qty: 60 TABLET | Refills: 3 | Status: SHIPPED | OUTPATIENT
Start: 2022-03-07 | End: 2022-11-22

## 2022-03-07 ASSESSMENT — ENCOUNTER SYMPTOMS
FLANK PAIN: 0
HEARTBURN: 0
PALPITATIONS: 0
DYSPNEA ON EXERTION: 0
PND: 0
BACK PAIN: 0
VOMITING: 0
ABDOMINAL PAIN: 0
NAUSEA: 0
SHORTNESS OF BREATH: 0
DECREASED APPETITE: 0
DIZZINESS: 0
WEIGHT LOSS: 0
ORTHOPNEA: 0
ALTERED MENTAL STATUS: 0
BLURRED VISION: 0
CLAUDICATION: 0
COUGH: 0
SYNCOPE: 0
CONSTIPATION: 0
FEVER: 0
NEAR-SYNCOPE: 0
DEPRESSION: 0
IRREGULAR HEARTBEAT: 0
WEIGHT GAIN: 0
DIARRHEA: 0

## 2022-03-07 ASSESSMENT — FIBROSIS 4 INDEX
FIB4 SCORE: 2.46
FIB4 SCORE: 2.46

## 2022-03-07 NOTE — PROGRESS NOTES
Patient is new to our clinic, and is here to establish care with Dr. Laurent.    PM was originally implanted in 2011, with generator replacement in December 2020.    Device is working normally. 1 mode switching episode lasting <30 seconds; 1 SVT episode lasting 6 minutes.  Normal sensing and capture of RA and RV leads; stable impedances. Battery longevity is 12.2 years.  No changes are made today.    Follow-up in 6 months for next PM check with me in [place], along with Dr. Laurent.

## 2022-03-07 NOTE — PROGRESS NOTES
Cardiology Note    Chief Complaint   Patient presents with   • Coronary Artery Disease     F/V Dx: Aortic aneurysm without rupture (HCC)       • Hyperlipidemia   • HTN (Controlled)       History of Present Illness: Antony Akbar is a 67 y.o. male PMH hypothyroidism, COPD, HTN, CAD/PCI s/p CABG 2012, carotid stenosis, PVD AGUSTIN s/p PCI, HLD, PPM who presents for follow up.    Admit [facility] 1/2022 for nstemi and uncontrolled BP. Underwent LHC. Treated with antianginals and antihypertensives.     Today chest pain improved. Still having almost daily occurrence. Uses wheelchair for mobility mostly but can walk short distances at home he says. Describes has been this way since issues with spinal stenosis. Blood pressures borderline normal occasionally hypotensive. Not light headed. Denies other cardiac complaints.     Previously treated by cardiology in [place], [place]. [facility].     Review of Systems   Constitutional: Negative for decreased appetite, fever, malaise/fatigue, weight gain and weight loss.   HENT: Negative for congestion and nosebleeds.    Eyes: Negative for blurred vision.   Cardiovascular: Positive for chest pain. Negative for claudication, dyspnea on exertion, irregular heartbeat, leg swelling, near-syncope, orthopnea, palpitations, paroxysmal nocturnal dyspnea and syncope.   Respiratory: Negative for cough and shortness of breath.    Endocrine: Negative for cold intolerance and heat intolerance.   Skin: Negative for rash.   Musculoskeletal: Negative for back pain.   Gastrointestinal: Negative for abdominal pain, constipation, diarrhea, heartburn, melena, nausea and vomiting.   Genitourinary: Negative for dysuria, flank pain and hematuria.   Neurological: Negative for dizziness.   Psychiatric/Behavioral: Negative for altered mental status and depression.         Past Medical History:   Diagnosis Date   • Asthma    • Back pain    • BPH (benign prostatic hyperplasia)    • Chronic obstructive  pulmonary disease (HCC)    • Hypertension    • Hypothyroid          Past Surgical History:   Procedure Laterality Date   • PB OPEN FIX INTER/SUBTROCH FX,IMPLNT Left 8/4/2021    Procedure: INSERTION, INTRAMEDULLARY MIKAEL, FEMUR, PROXIMAL;  Surgeon: Valentin Ríos M.D.;  Location: SURGERY [facility];  Service: Orthopedics   • CARDIAC CATH, RIGHT/LEFT HEART           Current Outpatient Medications   Medication Sig Dispense Refill   • ranolazine (RANEXA) 500 MG TABLET SR 12 HR Take 1 Tablet by mouth 2 times a day. 60 Tablet 3   • chlorthalidone (HYGROTON) 25 MG Tab Take 1 Tablet by mouth 1 time a day as needed (for blood pressure over 140/90). 30 Tablet 3   • carvedilol (COREG) 25 MG Tab Take 1 Tablet by mouth 2 times a day with meals. 180 Tablet 3   • lisinopril (PRINIVIL) 40 MG tablet Take 1 Tablet by mouth every day. 90 Tablet 3   • aspirin 81 MG EC tablet Take 1 Tablet by mouth every day. 100 Tablet 1   • levothyroxine (SYNTHROID) 50 MCG Tab Take 50 mcg by mouth every morning on an empty stomach.     • morphine ER (MS CONTIN) 15 MG Tab CR tablet Take 15 mg by mouth 3 times a day as needed (Pain).     • vitamin D3 (CHOLECALCIFEROL) 1000 Unit (25 mcg) Tab Take 1,000 Units by mouth every day.     • nitroglycerin (NITROSTAT) 0.4 MG SL Tab Place 1 Tablet under the tongue 1 time a day as needed for Chest Pain (chest pain). 25 Tablet 2   • atorvastatin (LIPITOR) 40 MG Tab Take 1 Tablet by mouth every day. 90 Tablet 2   • clopidogrel (PLAVIX) 75 MG Tab Take 1 Tablet by mouth every morning. 90 Tablet 2   • cyclobenzaprine (FLEXERIL) 10 mg Tab Take 1 Tablet by mouth 3 times a day as needed for Moderate Pain. 30 Tablet 0   • famotidine (PEPCID) 40 MG Tab Take 1 Tablet by mouth every evening. 90 Tablet 2   • isosorbide mononitrate SR (IMDUR) 60 MG TABLET SR 24 HR Take 1 Tablet by mouth 2 times a day. 180 Tablet 2   • tamsulosin (FLOMAX) 0.4 MG capsule Take 1 Capsule by mouth every evening. 90 Capsule 2   • gabapentin  "(NEURONTIN) 100 MG Cap Take 100 mg by mouth 4 times a day.     • sertraline (ZOLOFT) 100 MG Tab Take 100 mg by mouth every evening.     • EPINEPHrine (EPIPEN) 0.3 MG/0.3ML Solution Auto-injector solution for injection Inject 0.3 mg into the shoulder, thigh, or buttocks as needed.     • albuterol 108 (90 Base) MCG/ACT Aero Soln inhalation aerosol Inhale 2 Puffs every 6 hours as needed for Shortness of Breath.       No current facility-administered medications for this visit.         Allergies   Allergen Reactions   • Bee Venom Anaphylaxis         History reviewed. No pertinent family history.      Social History     Socioeconomic History   • Marital status: Single     Spouse name: Not on file   • Number of children: Not on file   • Years of education: Not on file   • Highest education level: Not on file   Occupational History   • Not on file   Tobacco Use   • Smoking status: Former Smoker     Types: Cigarettes     Quit date: 1995     Years since quittin.1   • Smokeless tobacco: Never Used   Vaping Use   • Vaping Use: Never used   Substance and Sexual Activity   • Alcohol use: Not Currently   • Drug use: Not Currently   • Sexual activity: Not on file   Other Topics Concern   • Not on file   Social History Narrative   • Not on file     Social Determinants of Health     Financial Resource Strain: Not on file   Food Insecurity: Not on file   Transportation Needs: Not on file   Physical Activity: Not on file   Stress: Not on file   Social Connections: Not on file   Intimate Partner Violence: Not on file   Housing Stability: Not on file         Physical Exam:  Ambulatory Vitals  /66 (BP Location: Left arm, Patient Position: Sitting, BP Cuff Size: Adult)   Pulse 80   Resp 14   Ht 1.702 m (5' 7\")   Wt 80.7 kg (178 lb)   SpO2 90%    BP Readings from Last 4 Encounters:   22 104/66   22 124/72   21 126/84   08/15/21 148/81     Weight/BMI:   Vitals:    22 0826   BP: 104/66   Weight: " "80.7 kg (178 lb)   Height: 1.702 m (5' 7\")    Body mass index is 27.88 kg/m².  Wt Readings from Last 4 Encounters:   03/07/22 80.7 kg (178 lb)   01/06/22 82 kg (180 lb 12.4 oz)   12/29/21 86.2 kg (190 lb)   08/15/21 81.5 kg (179 lb 10.8 oz)       Physical Exam  Constitutional:       General: He is not in acute distress.  HENT:      Head: Normocephalic and atraumatic.   Eyes:      Conjunctiva/sclera: Conjunctivae normal.      Pupils: Pupils are equal, round, and reactive to light.   Neck:      Vascular: No JVD.   Cardiovascular:      Rate and Rhythm: Normal rate and regular rhythm.      Heart sounds: Normal heart sounds. No murmur heard.    No friction rub. No gallop.   Pulmonary:      Effort: Pulmonary effort is normal. No respiratory distress.      Breath sounds: Normal breath sounds. No wheezing or rales.   Chest:      Chest wall: No tenderness.   Abdominal:      General: Bowel sounds are normal. There is no distension.      Palpations: Abdomen is soft.   Musculoskeletal:      Cervical back: Normal range of motion and neck supple.   Skin:     General: Skin is warm and dry.   Neurological:      Mental Status: He is alert and oriented to person, place, and time.   Psychiatric:         Mood and Affect: Affect normal.         Judgment: Judgment normal.           Lab Data Review:  Lab Results   Component Value Date/Time    CHOLSTRLTOT 103 01/04/2022 03:05 AM    LDL 43 01/04/2022 03:05 AM    HDL 33 (A) 01/04/2022 03:05 AM    TRIGLYCERIDE 135 01/04/2022 03:05 AM       Lab Results   Component Value Date/Time    SODIUM 137 01/06/2022 05:22 AM    POTASSIUM 4.0 01/06/2022 05:22 AM    CHLORIDE 104 01/06/2022 05:22 AM    CO2 20 01/06/2022 05:22 AM    GLUCOSE 71 01/06/2022 05:22 AM    BUN 8 01/06/2022 05:22 AM    CREATININE 0.88 01/06/2022 05:22 AM     CrCl cannot be calculated (Patient's most recent lab result is older than the maximum 7 days allowed.).  Lab Results   Component Value Date/Time    ALKPHOSPHAT 110 (H) 01/04/2022 " 03:05 AM    ASTSGOT 14 01/04/2022 03:05 AM    ALTSGPT 8 01/04/2022 03:05 AM    TBILIRUBIN 0.6 01/04/2022 03:05 AM      Lab Results   Component Value Date/Time    WBC 11.2 (H) 01/05/2022 02:03 AM     Lab Results   Component Value Date/Time    HBA1C 5.8 (H) 08/07/2021 05:23 AM     No components found for: TROP      Cardiac Imaging and Procedures Review:      EKG 1/4/22 interpreted by me atrial pacing, incomplete rbbb    C 1/5/22  IMPRESSIONS:  1. Non-coronary source of chest pain  2. Severe native one vessel CAD  3. 1/2 patent bypass grafts  4. Normal LVEDP  5. Incidental note of severe PAD in the R SFA  Hemodynamics:   Aorta: 138/72 mmHg  LVEDP: 7 mmHg  No significant pullback gradient across the aortic valve  Coronary Anatomy              Left Main: Diffuse 30% stenosis              LAD: Patent proximal stent. The LAD is congenitally of small caliber (1.5-2.0 mm). The first diagonal is grafted and proximal 90% stenosis.               LCx: Co-dominant, proximal 20% , mid 30% stenosis. The OM is large and has proximal 20% stenosis, the left PLB is normal. The left PDA is small and normal              RCA: Co-dominant, small vessel with ostial 50% stenosis. iFR is 0.96. The mid RCA has 40% stenosis. The PDA branches from the mid right and is normal.   Grafts              LIMA-diagonal: Patent              VG-unknown - occluded at the anastamosis  Left ventriculography:   LVEF= 55%. Normal wall motion No mitral regurgitation Ectatic ascending aorta    TTE 2/25/22  CONCLUSIONS  Normal left ventricular size, wall thickness, systolic, and diastolic   function.  Normal right ventricular size and systolic function.  Normal left atrial size.  No significant valvular pathology.  No pericardial effusion.  Left Ventricle  Normal left ventricular chamber size. Normal left ventricular wall   thickness. Normal left ventricular systolic function. The left   ventricular ejection fraction is visually estimated to be 55%. Normal    regional wall motion. Normal diastolic function.    AAA screen 2/25/22  CONCLUSIONS   No evidence of abdominal aortic or iliac artery aneurysm.    Abdominal aorta and bilateral iliac arteries with no hemodynamically    significant stenosis. Diffuse atherosclerotic plaque.    Medical Decision Making:  Problem List Items Addressed This Visit     Primary hypertension    Relevant Medications    ranolazine (RANEXA) 500 MG TABLET SR 12 HR    chlorthalidone (HYGROTON) 25 MG Tab    CAD (coronary artery disease) of artery bypass graft    Relevant Medications    ranolazine (RANEXA) 500 MG TABLET SR 12 HR    chlorthalidone (HYGROTON) 25 MG Tab    Angina pectoris (HCC)    Relevant Medications    ranolazine (RANEXA) 500 MG TABLET SR 12 HR    chlorthalidone (HYGROTON) 25 MG Tab    HLD (hyperlipidemia)    Relevant Medications    ranolazine (RANEXA) 500 MG TABLET SR 12 HR    chlorthalidone (HYGROTON) 25 MG Tab    PAD (peripheral artery disease) (HCC)    Relevant Medications    ranolazine (RANEXA) 500 MG TABLET SR 12 HR    chlorthalidone (HYGROTON) 25 MG Tab    Other Relevant Orders    US-EXTREMITY ARTERY LOWER BILAT W/LOUIE (COMBO)        CAD / PAD - DAPT for 12 months from nstemi 1/2022. Check LOUIE. Continue antianginals. Resume ranexa.     HLD - continue statin. Goal LDL <70 and trig <150.    HTN - goal 120/80. Overcorrected. Reduce chlorthalidone to prn for elevated blood pressures.     PPM - regular interrogations.     It was my pleasure to meet with Mr. Akbar.    A total of 42 minutes of time was spent on day of encounter reviewing medical record, performing history and examination, counseling, ordering medication/test/consults and documentation.

## 2022-03-07 NOTE — PATIENT INSTRUCTIONS
Change chlorthalidone to just as needed for blood pressure over 140/90 if remains so after morning medications. Repeat blood pressures 2-4 hours after morning medications.    Add ranexa 500mg twice daily    Ranolazine tablets, extended release  What is this medicine?  RANOLAZINE (jennifer mcduffie) is a heart medicine. It is used to treat chronic chest pain (angina). This medicine must be taken regularly. It will not relieve an acute episode of chest pain.  This medicine may be used for other purposes; ask your health care provider or pharmacist if you have questions.  COMMON BRAND NAME(S): Ranexa  What should I tell my health care provider before I take this medicine?  They need to know if you have any of these conditions:  · heart disease  · irregular heartbeat  · kidney disease  · liver disease  · low levels of potassium or magnesium in the blood  · an unusual or allergic reaction to ranolazine, other medicines, foods, dyes, or preservatives  · pregnant or trying to get pregnant  · breast-feeding  How should I use this medicine?  Take this medicine by mouth with a glass of water. Follow the directions on the prescription label. Do not cut, crush, or chew this medicine. Take with or without food. Do not take this medication with grapefruit juice. Take your doses at regular intervals. Do not take your medicine more often then directed.  Talk to your pediatrician regarding the use of this medicine in children. Special care may be needed.  Overdosage: If you think you have taken too much of this medicine contact a poison control center or emergency room at once.  NOTE: This medicine is only for you. Do not share this medicine with others.  What if I miss a dose?  If you miss a dose, take it as soon as you can. If it is almost time for your next dose, take only that dose. Do not take double or extra doses.  What may interact with this medicine?  Do not take this medicine with any of the following  medications:  · antivirals for HIV or AIDS  · cerivastatin  · certain antibiotics like chloramphenicol, clarithromycin, dalfopristin; quinupristin, isoniazid, rifabutin, rifampin, rifapentine  · certain medicines used for cancer like imatinib, nilotinib  · certain medicines for fungal infections like fluconazole, itraconazole, ketoconazole, posaconazole, voriconazole  · certain medicines for irregular heart beat like dronedarone  · certain medicines for seizures like carbamazepine, fosphenytoin, oxcarbazepine, phenobarbital, phenytoin  · cisapride  · conivaptan  · cyclosporine  · grapefruit or grapefruit juice  · lumacaftor; ivacaftor  · nefazodone  · pimozide  · quinacrine  · Kolton's wort  · thioridazine  This medicine may also interact with the following medications:  · alfuzosin  · certain medicines for depression, anxiety, or psychotic disturbances like bupropion, citalopram, fluoxetine, fluphenazine, paroxetine, perphenazine, risperidone, sertraline, trifluoperazine  · certain medicines for cholesterol like atorvastatin, lovastatin, simvastatin  · certain medicines for stomach problems like octreotide, palonosetron, prochlorperazine  · eplerenone  · ergot alkaloids like dihydroergotamine, ergonovine, ergotamine, methylergonovine  · metformin  · nicardipine  · other medicines that prolong the QT interval (cause an abnormal heart rhythm) like dofetilide, ziprasidone  · sirolimus  · tacrolimus  This list may not describe all possible interactions. Give your health care provider a list of all the medicines, herbs, non-prescription drugs, or dietary supplements you use. Also tell them if you smoke, drink alcohol, or use illegal drugs. Some items may interact with your medicine.  What should I watch for while using this medicine?  Visit your doctor for regular check ups. Tell your doctor or healthcare professional if your symptoms do not start to get better or if they get worse. This medicine will not relieve an  acute attack of angina or chest pain.  This medicine can change your heart rhythm. Your health care provider may check your heart rhythm by ordering an electrocardiogram (ECG) while you are taking this medicine.  You may get drowsy or dizzy. Do not drive, use machinery, or do anything that needs mental alertness until you know how this medicine affects you. Do not stand or sit up quickly, especially if you are an older patient. This reduces the risk of dizzy or fainting spells. Alcohol may interfere with the effect of this medicine. Avoid alcoholic drinks.  If you are scheduled for any medical or dental procedure, tell your healthcare provider that you are taking this medicine. This medicine can interact with other medicines used during surgery.  What side effects may I notice from receiving this medicine?  Side effects that you should report to your doctor or health care professional as soon as possible:  · allergic reactions like skin rash, itching or hives, swelling of the face, lips, or tongue  · breathing problems  · changes in vision  · fast, irregular or pounding heartbeat  · feeling faint or lightheaded, falls  · low or high blood pressure  · numbness or tingling feelings  · ringing in the ears  · tremor or shakiness  · slow heartbeat (fewer than 50 beats per minute)  · swelling of the legs or feet  Side effects that usually do not require medical attention (report to your doctor or health care professional if they continue or are bothersome):  · constipation  · drowsy  · dry mouth  · headache  · nausea or vomiting  · stomach upset  This list may not describe all possible side effects. Call your doctor for medical advice about side effects. You may report side effects to FDA at 9-778-CIL-0973.  Where should I keep my medicine?  Keep out of the reach of children.  Store at room temperature between 15 and 30 degrees C (59 and 86 degrees F). Throw away any unused medicine after the expiration date.  NOTE: This  sheet is a summary. It may not cover all possible information. If you have questions about this medicine, talk to your doctor, pharmacist, or health care provider.  © 2020 [employer] (2019-12-10 09:18:49)    Chlorthalidone tablets  What is this medicine?  CHLORTHALIDONE (klor THAL i done) is a diuretic. It increases the amount of urine passed, which causes the body to lose salt and water. This medicine is used to treat high blood pressure and edema or water retention.  This medicine may be used for other purposes; ask your health care provider or pharmacist if you have questions.  COMMON BRAND NAME(S): Thalitone  What should I tell my health care provider before I take this medicine?  They need to know if you have any of these conditions:  · asthma  · diabetes  · gout  · kidney disease  · liver disease  · parathyroid disease  · systemic lupus erythematosus (SLE)  · taking cortisone, digoxin, lithium carbonate, or drugs for diabetes  · an unusual or allergic reaction to chlorthalidone, sulfa drugs, other medicines, foods, dyes, or preservatives  · pregnant or trying to get pregnant  · breast-feeding  How should I use this medicine?  Take this medicine by mouth with a glass of water. Follow the directions on the prescription label. It is best to take your dose in the morning with food. Take your medicine at regular intervals. Do not take your medicine more often than directed. Do not stop taking except on your doctor's advice.  Talk to your pediatrician regarding the use of this medicine in children. Special care may be needed.  Overdosage: If you think you have taken too much of this medicine contact a poison control center or emergency room at once.  NOTE: This medicine is only for you. Do not share this medicine with others.  What if I miss a dose?  If you miss a dose, take it as soon as you can. If it is almost time for your next dose, take only that dose. Do not take double or extra doses.  What may  interact with this medicine?  · barbiturate medicines for sleep or seizure control  · digoxin  · lithium  · medicines for diabetes  · norepinephrine  · other medicines for high blood pressure  · some pain medicines  · steroid hormones like prednisone, cortisone, hydrocortisone, corticotropin  · tubocurarine  This list may not describe all possible interactions. Give your health care provider a list of all the medicines, herbs, non-prescription drugs, or dietary supplements you use. Also tell them if you smoke, drink alcohol, or use illegal drugs. Some items may interact with your medicine.  What should I watch for while using this medicine?  Visit your doctor or health care professional for regular check ups. Check your blood pressure as directed. Ask your doctor or health care professional what your blood pressure should be and when you should contact him or her.  You may need to be on a special diet while taking this medicine. Ask your doctor.  You may get drowsy or dizzy. Do not drive, use machinery, or do anything that needs mental alertness until you know how this medicine affects you. Do not stand or sit up quickly, especially if you are an older patient. This reduces the risk of dizzy or fainting spells. Alcohol may interfere with the effect of this medicine. Avoid alcoholic drinks.  This medicine may affect your blood sugar level. If you have diabetes, check with your doctor or health care professional before changing the dose of your diabetic medicine.  This medicine can make you more sensitive to the sun. Keep out of the sun. If you cannot avoid being in the sun, wear protective clothing and use sunscreen. Do not use sun lamps or tanning beds/booths.  What side effects may I notice from receiving this medicine?  Side effects that you should report to your doctor or health care professional as soon as possible:  · allergic reactions like skin rash, itching or hives, swelling of the face, lips, or  tongue  · dark urine  · dry mouth  · excess thirst  · fast, irregular heart rate  · fever, chills  · muscle pain, cramps, or spasm  · nausea, vomiting  · redness, blistering, peeling or loosening of the skin, including inside the mouth  · tingling, pain or numbness in the hands or feet  · unusually weak or tired  · yellowing of the eyes or skin  Side effects that usually do not require medical attention (report to your doctor or health care professional if they continue or are bothersome):  · diarrhea or constipation  · headache  · impotence  · loss of appetite  · stomach upset  This list may not describe all possible side effects. Call your doctor for medical advice about side effects. You may report side effects to FDA at 6-220-HDH-4821.  Where should I keep my medicine?  Keep out of the reach of children.  Store at room temperature between 15 and 30 degrees C (59 and 86 degrees F). Keep container tightly closed. Throw away any unused medicine after the expiration date.  NOTE: This sheet is a summary. It may not cover all possible information. If you have questions about this medicine, talk to your doctor, pharmacist, or health care provider.  © 2020 [employer] (2009-03-24 15:28:48)

## 2022-03-08 ENCOUNTER — TELEPHONE (OUTPATIENT)
Dept: SCHEDULING | Facility: IMAGING CENTER | Age: 67
End: 2022-03-08
Payer: MEDICARE

## 2022-03-08 DIAGNOSIS — I25.708 CORONARY ARTERY DISEASE INVOLVING CORONARY BYPASS GRAFT WITH OTHER FORMS OF ANGINA PECTORIS, UNSPECIFIED WHETHER NATIVE OR TRANSPLANTED HEART (HCC): ICD-10-CM

## 2022-03-08 DIAGNOSIS — I20.9 ANGINA PECTORIS (HCC): ICD-10-CM

## 2022-03-08 NOTE — TELEPHONE ENCOUNTER
Question on current medications    ask about metropropolal, did Dr. Laurent put him back on it?  Spouse states medication listed on most recent after visit summary.     Patient will not take the Ranexa - says increases kidney issues

## 2022-03-09 NOTE — TELEPHONE ENCOUNTER
Claudio Laurent M.D.  You 50 minutes ago (4:54 PM)         Discussed with patient's wife. Will trial ranexa and repeat BMP 5-7 days following. Already on carvedilol. Instructed not take metoprolol. Thanks Nena!    Message text      ---------------------  NOTED

## 2022-07-01 NOTE — ASSESSMENT & PLAN NOTE
Continue levothyroxine   50 y/o F with PMHx of DM2, HTN, and hypothyroidism presented to her PCP with weakness, fatigue, n/v, and headache. CBC was performed at PCP revealed , ANC 0, .5, 15% blasts, Hb 8.7, Plt 5 and was referred to [facility]. Peripheral blood flow at [facility] on 6/15/22 with 78% B-cell lymphoblasts. Hospital course complicated  by SDH, E.Coli bacteremia, chest pain likely related to cough seen by cardiology with no acute intervention. Peripheral flow cytometry consistent with B-ALL. 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

## 2022-11-18 ENCOUNTER — TELEPHONE (OUTPATIENT)
Dept: CARDIOLOGY | Facility: MEDICAL CENTER | Age: 67
End: 2022-11-18
Payer: MEDICARE

## 2022-11-18 NOTE — TELEPHONE ENCOUNTER
Called and LVM for pt regarding fup appt with [facility] 11/22/22. [facility] Pt is to call us back and also confirm appt with [facility].

## 2022-11-22 ENCOUNTER — NON-PROVIDER VISIT (OUTPATIENT)
Dept: CARDIOLOGY | Facility: MEDICAL CENTER | Age: 67
End: 2022-11-22
Payer: MEDICARE

## 2022-11-22 ENCOUNTER — OFFICE VISIT (OUTPATIENT)
Dept: CARDIOLOGY | Facility: MEDICAL CENTER | Age: 67
End: 2022-11-22
Payer: MEDICARE

## 2022-11-22 VITALS
RESPIRATION RATE: 16 BRPM | OXYGEN SATURATION: 97 % | WEIGHT: 159 LBS | BODY MASS INDEX: 24.96 KG/M2 | HEART RATE: 88 BPM | DIASTOLIC BLOOD PRESSURE: 70 MMHG | SYSTOLIC BLOOD PRESSURE: 110 MMHG | HEIGHT: 67 IN

## 2022-11-22 DIAGNOSIS — I25.9 ISCHEMIC HEART DISEASE DUE TO CORONARY ARTERY OBSTRUCTION (HCC): ICD-10-CM

## 2022-11-22 DIAGNOSIS — E78.5 HYPERLIPIDEMIA, UNSPECIFIED HYPERLIPIDEMIA TYPE: ICD-10-CM

## 2022-11-22 DIAGNOSIS — I49.5 SICK SINUS SYNDROME (HCC): ICD-10-CM

## 2022-11-22 DIAGNOSIS — I73.9 PAD (PERIPHERAL ARTERY DISEASE) (HCC): ICD-10-CM

## 2022-11-22 DIAGNOSIS — I20.9 ANGINA PECTORIS (HCC): ICD-10-CM

## 2022-11-22 DIAGNOSIS — I10 PRIMARY HYPERTENSION: ICD-10-CM

## 2022-11-22 DIAGNOSIS — Z95.0 CARDIAC PACEMAKER IN SITU: ICD-10-CM

## 2022-11-22 DIAGNOSIS — I24.0 ISCHEMIC HEART DISEASE DUE TO CORONARY ARTERY OBSTRUCTION (HCC): ICD-10-CM

## 2022-11-22 DIAGNOSIS — I70.0 AORTIC ATHEROSCLEROSIS (HCC): ICD-10-CM

## 2022-11-22 PROCEDURE — 99215 OFFICE O/P EST HI 40 MIN: CPT | Performed by: INTERNAL MEDICINE

## 2022-11-22 PROCEDURE — 93280 PM DEVICE PROGR EVAL DUAL: CPT | Performed by: INTERNAL MEDICINE

## 2022-11-22 RX ORDER — ONDANSETRON 4 MG/1
TABLET, ORALLY DISINTEGRATING ORAL
COMMUNITY
Start: 2022-11-18

## 2022-11-22 RX ORDER — LISINOPRIL 40 MG/1
20 TABLET ORAL 2 TIMES DAILY
Qty: 180 TABLET | Refills: 3 | Status: SHIPPED | OUTPATIENT
Start: 2022-11-22 | End: 2023-09-19 | Stop reason: SDUPTHER

## 2022-11-22 RX ORDER — ALPRAZOLAM 0.25 MG/1
TABLET ORAL
COMMUNITY
Start: 2022-11-10 | End: 2023-09-19

## 2022-11-22 RX ORDER — ISOSORBIDE MONONITRATE 120 MG/1
120 TABLET, EXTENDED RELEASE ORAL 2 TIMES DAILY
Qty: 180 TABLET | Refills: 3 | Status: SHIPPED | OUTPATIENT
Start: 2022-11-22 | End: 2023-07-07 | Stop reason: SDUPTHER

## 2022-11-22 RX ORDER — POLYETHYLENE GLYCOL 3350 17 G/17G
POWDER, FOR SOLUTION ORAL
COMMUNITY
Start: 2022-11-04

## 2022-11-22 RX ORDER — OMEPRAZOLE 20 MG/1
20 CAPSULE, DELAYED RELEASE ORAL DAILY
Qty: 90 CAPSULE | Refills: 3 | Status: SHIPPED | OUTPATIENT
Start: 2022-11-22

## 2022-11-22 RX ORDER — BUPROPION HYDROCHLORIDE 150 MG/1
TABLET, EXTENDED RELEASE ORAL
COMMUNITY
Start: 2022-10-07

## 2022-11-22 ASSESSMENT — ENCOUNTER SYMPTOMS
SHORTNESS OF BREATH: 0
BLURRED VISION: 0
CLAUDICATION: 0
COUGH: 0
PND: 0
IRREGULAR HEARTBEAT: 0
BACK PAIN: 0
PALPITATIONS: 0
ORTHOPNEA: 0
FLANK PAIN: 0
WEIGHT GAIN: 0
FEVER: 0
NAUSEA: 0
ALTERED MENTAL STATUS: 0
VOMITING: 0
DYSPNEA ON EXERTION: 0
CONSTIPATION: 0
DECREASED APPETITE: 0
NEAR-SYNCOPE: 0
DIZZINESS: 0
DEPRESSION: 0
SYNCOPE: 0
ABDOMINAL PAIN: 0
WEIGHT LOSS: 0
DIARRHEA: 0
HEARTBURN: 0

## 2022-11-22 ASSESSMENT — FIBROSIS 4 INDEX: FIB4 SCORE: 2.46

## 2022-11-22 NOTE — PATIENT INSTRUCTIONS
Increase imdur.  Change lisinopril to twice daily. If low blood pressure or light headed can reduce to once per day.   Hold chlorthalidone.     Please discuss physical therapy with Dr Hope.

## 2022-11-22 NOTE — PROGRESS NOTES
Cardiology Note    Chief Complaint   Patient presents with    Other     F/V Dx: PAD (peripheral artery disease) (Formerly [facility])       History of Present Illness: Antony Akbar is a 67 y.o. male PMH hypothyroidism, COPD, HTN, CAD/PCI s/p CABG 2012, carotid stenosis, PVD AGUSTIN s/p PCI, HLD, SSS s/p PPM who presents for follow up.    Since last visit still with chest pain. Mostly in the morning. Improves later in the day. Ongoing chest pressure with exertion. Does complain of heartburn. No other cardiac complaints. He did not tolerate ranexa and developed brown urine which resolved after stopping medication. He attempts to walk short distances but has leg weakness and needs walker. Previously didn't completed PT due to tachycardia however he and his wife will try again.     Admit [facility] 1/2022 for nstemi and uncontrolled BP. Underwent LHC without intervention due to preserved flow epicardial coronary arteries. Treated with antianginals and antihypertensives.     Previously treated by cardiology in [place], [place]. [facility].     Review of Systems   Constitutional: Negative for decreased appetite, fever, malaise/fatigue, weight gain and weight loss.   HENT:  Negative for congestion and nosebleeds.    Eyes:  Negative for blurred vision.   Cardiovascular:  Positive for chest pain. Negative for claudication, dyspnea on exertion, irregular heartbeat, leg swelling, near-syncope, orthopnea, palpitations, paroxysmal nocturnal dyspnea and syncope.   Respiratory:  Negative for cough and shortness of breath.    Endocrine: Negative for cold intolerance and heat intolerance.   Skin:  Negative for rash.   Musculoskeletal:  Negative for back pain.   Gastrointestinal:  Negative for abdominal pain, constipation, diarrhea, heartburn, melena, nausea and vomiting.   Genitourinary:  Negative for dysuria, flank pain and hematuria.   Neurological:  Negative for dizziness.   Psychiatric/Behavioral:  Negative for altered mental status and  depression.        Past Medical History:   Diagnosis Date    Asthma     Back pain     BPH (benign prostatic hyperplasia)     Chronic obstructive pulmonary disease (HCC)     Hypertension     Hypothyroid          Past Surgical History:   Procedure Laterality Date    PB OPEN FIX INTER/SUBTROCH FX,IMPLNT Left 8/4/2021    Procedure: INSERTION, INTRAMEDULLARY MIKAEL, FEMUR, PROXIMAL;  Surgeon: Valentin Ríos M.D.;  Location: SURGERY [facility];  Service: Orthopedics    PACEMAKER INSERTION Left 12/04/2020    Generator replacement with [employer]rine XT DR ADONAY [id number] implanted in [place]. Original device implanted in 2011.    CARDIAC CATH, RIGHT/LEFT HEART           Current Outpatient Medications   Medication Sig Dispense Refill    ALPRAZolam (XANAX) 0.25 MG Tab       buPROPion SR (WELLBUTRIN-SR) 150 MG TABLET SR 12 HR sustained-release tablet       ondansetron (ZOFRAN ODT) 4 MG TABLET DISPERSIBLE       GNP CLEARLAX 17 GM/SCOOP Powder       isosorbide mononitrate SR (IMDUR) 120 MG CR tablet Take 1 Tablet by mouth 2 times a day. 180 Tablet 3    lisinopril (PRINIVIL) 40 MG tablet Take 0.5 Tablets by mouth 2 times a day. 180 Tablet 3    omeprazole (PRILOSEC) 20 MG delayed-release capsule Take 1 Capsule by mouth every day. 90 Capsule 3    carvedilol (COREG) 25 MG Tab Take 1 Tablet by mouth 2 times a day with meals. (Patient taking differently: Take 25 mg by mouth every day.) 180 Tablet 3    aspirin 81 MG EC tablet Take 1 Tablet by mouth every day. 100 Tablet 1    levothyroxine (SYNTHROID) 50 MCG Tab Take 50 mcg by mouth every morning on an empty stomach.      morphine ER (MS CONTIN) 15 MG Tab CR tablet Take 15 mg by mouth 3 times a day as needed (Pain).      vitamin D3 (CHOLECALCIFEROL) 1000 Unit (25 mcg) Tab Take 1,000 Units by mouth every day.      nitroglycerin (NITROSTAT) 0.4 MG SL Tab Place 1 Tablet under the tongue 1 time a day as needed for Chest Pain (chest pain). 25 Tablet 2    atorvastatin (LIPITOR) 40 MG Tab Take 1  Tablet by mouth every day. 90 Tablet 2    clopidogrel (PLAVIX) 75 MG Tab Take 1 Tablet by mouth every morning. 90 Tablet 2    cyclobenzaprine (FLEXERIL) 10 mg Tab Take 1 Tablet by mouth 3 times a day as needed for Moderate Pain. 30 Tablet 0    tamsulosin (FLOMAX) 0.4 MG capsule Take 1 Capsule by mouth every evening. 90 Capsule 2    gabapentin (NEURONTIN) 100 MG Cap Take 100 mg by mouth 2 times a day.      EPINEPHrine (EPIPEN) 0.3 MG/0.3ML Solution Auto-injector solution for injection Inject 0.3 mg into the shoulder, thigh, or buttocks as needed.      albuterol 108 (90 Base) MCG/ACT Aero Soln inhalation aerosol Inhale 2 Puffs every 6 hours as needed for Shortness of Breath.       No current facility-administered medications for this visit.         Allergies   Allergen Reactions    Bee Venom Anaphylaxis         History reviewed. No pertinent family history.      Social History     Socioeconomic History    Marital status: Single     Spouse name: Not on file    Number of children: Not on file    Years of education: Not on file    Highest education level: Not on file   Occupational History    Not on file   Tobacco Use    Smoking status: Former     Types: Cigarettes     Quit date: 1995     Years since quittin.9    Smokeless tobacco: Never   Vaping Use    Vaping Use: Never used   Substance and Sexual Activity    Alcohol use: Not Currently    Drug use: Not Currently    Sexual activity: Not on file   Other Topics Concern    Not on file   Social History Narrative    Not on file     Social Determinants of Health     Financial Resource Strain: Not on file   Food Insecurity: Not on file   Transportation Needs: Not on file   Physical Activity: Not on file   Stress: Not on file   Social Connections: Not on file   Intimate Partner Violence: Not on file   Housing Stability: Not on file         Physical Exam:  Ambulatory Vitals  /70 (BP Location: Left arm, Patient Position: Sitting, BP Cuff Size: Adult)   Pulse 88    "Resp 16   Ht 1.702 m (5' 7\")   Wt 72.1 kg (159 lb)   SpO2 97%    BP Readings from Last 4 Encounters:   11/22/22 110/70   03/07/22 106/68   03/07/22 104/66   01/06/22 124/72     Weight/BMI:   Vitals:    11/22/22 0927   BP: 110/70   Weight: 72.1 kg (159 lb)   Height: 1.702 m (5' 7\")    Body mass index is 24.9 kg/m².  Wt Readings from Last 4 Encounters:   11/22/22 72.1 kg (159 lb)   03/07/22 80.7 kg (178 lb)   03/07/22 80.7 kg (178 lb)   01/06/22 82 kg (180 lb 12.4 oz)       Physical Exam  Constitutional:       General: He is not in acute distress.  HENT:      Head: Normocephalic and atraumatic.   Eyes:      Conjunctiva/sclera: Conjunctivae normal.      Pupils: Pupils are equal, round, and reactive to light.   Neck:      Vascular: No JVD.   Cardiovascular:      Rate and Rhythm: Normal rate and regular rhythm.      Heart sounds: Normal heart sounds. No murmur heard.    No friction rub. No gallop.   Pulmonary:      Effort: Pulmonary effort is normal. No respiratory distress.      Breath sounds: Normal breath sounds. No wheezing or rales.   Chest:      Chest wall: No tenderness.   Abdominal:      General: Bowel sounds are normal. There is no distension.      Palpations: Abdomen is soft.   Musculoskeletal:      Cervical back: Normal range of motion and neck supple.   Skin:     General: Skin is warm and dry.   Neurological:      Mental Status: He is alert and oriented to person, place, and time.   Psychiatric:         Mood and Affect: Affect normal.         Judgment: Judgment normal.         Lab Data Review:  Lab Results   Component Value Date/Time    CHOLSTRLTOT 103 01/04/2022 03:05 AM    LDL 43 01/04/2022 03:05 AM    HDL 33 (A) 01/04/2022 03:05 AM    TRIGLYCERIDE 135 01/04/2022 03:05 AM       Lab Results   Component Value Date/Time    SODIUM 137 01/06/2022 05:22 AM    POTASSIUM 4.0 01/06/2022 05:22 AM    CHLORIDE 104 01/06/2022 05:22 AM    CO2 20 01/06/2022 05:22 AM    GLUCOSE 71 01/06/2022 05:22 AM    BUN 8 01/06/2022 " 05:22 AM    CREATININE 0.88 01/06/2022 05:22 AM     CrCl cannot be calculated (Patient's most recent lab result is older than the maximum 7 days allowed.).  Lab Results   Component Value Date/Time    ALKPHOSPHAT 110 (H) 01/04/2022 03:05 AM    ASTSGOT 14 01/04/2022 03:05 AM    ALTSGPT 8 01/04/2022 03:05 AM    TBILIRUBIN 0.6 01/04/2022 03:05 AM      Lab Results   Component Value Date/Time    WBC 11.2 (H) 01/05/2022 02:03 AM     Lab Results   Component Value Date/Time    HBA1C 5.8 (H) 08/07/2021 05:23 AM     No components found for: TROP      Cardiac Imaging and Procedures Review:      EKG 1/4/22 interpreted by me atrial pacing, incomplete rbbb    LHC 1/5/22  IMPRESSIONS:  1. Non-coronary source of chest pain  2. Severe native one vessel CAD  3. 1/2 patent bypass grafts  4. Normal LVEDP  5. Incidental note of severe PAD in the R SFA  Hemodynamics:   Aorta: 138/72 mmHg  LVEDP: 7 mmHg  No significant pullback gradient across the aortic valve  Coronary Anatomy              Left Main: Diffuse 30% stenosis              LAD: Patent proximal stent. The LAD is congenitally of small caliber (1.5-2.0 mm). The first diagonal is grafted and proximal 90% stenosis.               LCx: Co-dominant, proximal 20% , mid 30% stenosis. The OM is large and has proximal 20% stenosis, the left PLB is normal. The left PDA is small and normal              RCA: Co-dominant, small vessel with ostial 50% stenosis. iFR is 0.96. The mid RCA has 40% stenosis. The PDA branches from the mid right and is normal.   Grafts              LIMA-diagonal: Patent              VG-unknown - occluded at the anastamosis  Left ventriculography:   LVEF= 55%. Normal wall motion No mitral regurgitation Ectatic ascending aorta    TTE 2/25/22  CONCLUSIONS  Normal left ventricular size, wall thickness, systolic, and diastolic   function.  Normal right ventricular size and systolic function.  Normal left atrial size.  No significant valvular pathology.  No pericardial  effusion.  Left Ventricle  Normal left ventricular chamber size. Normal left ventricular wall   thickness. Normal left ventricular systolic function. The left   ventricular ejection fraction is visually estimated to be 55%. Normal   regional wall motion. Normal diastolic function.    AAA screen 2/25/22  CONCLUSIONS   No evidence of abdominal aortic or iliac artery aneurysm.    Abdominal aorta and bilateral iliac arteries with no hemodynamically    significant stenosis. Diffuse atherosclerotic plaque.    Medical Decision Making:  Problem List Items Addressed This Visit       Primary hypertension    Relevant Medications    isosorbide mononitrate SR (IMDUR) 120 MG CR tablet    lisinopril (PRINIVIL) 40 MG tablet    Ischemic heart disease due to coronary artery obstruction (HCC)    Relevant Medications    isosorbide mononitrate SR (IMDUR) 120 MG CR tablet    lisinopril (PRINIVIL) 40 MG tablet    Angina pectoris (HCC)    Relevant Medications    isosorbide mononitrate SR (IMDUR) 120 MG CR tablet    lisinopril (PRINIVIL) 40 MG tablet    HLD (hyperlipidemia)    Relevant Medications    isosorbide mononitrate SR (IMDUR) 120 MG CR tablet    lisinopril (PRINIVIL) 40 MG tablet    PAD (peripheral artery disease) ([facility])    Relevant Medications    isosorbide mononitrate SR (IMDUR) 120 MG CR tablet    lisinopril (PRINIVIL) 40 MG tablet    Other Relevant Orders    US-EXTREMITY ARTERY LOWER BILAT W/LOUIE (COMBO)    Cardiac pacemaker in situ    Sick sinus syndrome (HCC)    Relevant Medications    isosorbide mononitrate SR (IMDUR) 120 MG CR tablet    lisinopril (PRINIVIL) 40 MG tablet    Aortic atherosclerosis (HCC)    Relevant Medications    isosorbide mononitrate SR (IMDUR) 120 MG CR tablet    lisinopril (PRINIVIL) 40 MG tablet     CAD / PAD - DAPT for 12 months from nstemi 1/2022. Check LOUIE. Continue antianginals and titrate imdur. Change to PPI.    HLD - continue statin. Goal LDL <70 and trig <150.    HTN - goal 120/80. In light of  increasing imdur d/c chlorthalidone and split lisinopril to twice daily.     PPM - regular interrogations q6mo.     It was my pleasure to meet with Mr. Akbar.    A total of 40 minutes of time was spent on day of encounter reviewing medical record, performing history and examination, counseling, ordering medication/test/consults and documentation.

## 2022-11-22 NOTE — TELEPHONE ENCOUNTER
Randy Pinto,    This patient significant other [place] was returning your call and stated that she wasn't sure if Antony did the US order from VR or not. She stated he been struggling with keeping his appts in order. She said they will be there tomorrow morning.      Thank you,    ANNIA

## 2022-11-28 ENCOUNTER — TELEPHONE (OUTPATIENT)
Dept: SCHEDULING | Facility: IMAGING CENTER | Age: 67
End: 2022-11-28
Payer: MEDICARE

## 2022-11-28 DIAGNOSIS — I10 PRIMARY HYPERTENSION: ICD-10-CM

## 2022-11-28 RX ORDER — CHLORTHALIDONE 25 MG/1
25 TABLET ORAL DAILY
Qty: 90 TABLET | Refills: 3 | Status: SHIPPED | OUTPATIENT
Start: 2022-11-28 | End: 2022-12-01 | Stop reason: SDUPTHER

## 2022-11-28 NOTE — TELEPHONE ENCOUNTER
VR    Caller:    Everett Beckham     Topic/issue:  Everett (poa) called stating pt blood pressure has been 144/107 , was high yesterday as well (154/113 158/108 - yesterday  bp)    She also states the medication given to the patient for stomach related problems  is having some bad side effects as well .  (omeprazole (PRILOSEC) 20 MG delayed-release capsule )    Callback Number: [phone]     I did call [facility] ext [phone] was told to call Jenny Eric , was unable to reach her.    Thank you   Dianne VIDAL

## 2022-11-28 NOTE — TELEPHONE ENCOUNTER
"To VR, please review recent BP log since medication change, see below.  Dizziness alleviated, no other symptoms.      Pt also requesting your advise on omeprazole interacting with plavix.  Pt previously on pantoprazole.      Please advise, thank you!    =====================    Returned [place] call, [phone], and reviewed concerns.  She states since last OV visit and medication changes, pt \"dizziness went away\" however BP increased, see BP log below.  She states pt takes medication at 0400 and when she comes home at 1000 \"he's just getting up\" and will check BP at that time.    11/28 144/107   11/27 137/107   11/26 136/105   11/26 114/83 evening at 1600   11/25 144/103   11/24 168/138     She states pt experienced back pain yesterday, but no complaints of pain or other symptoms today.  Virginia also states they \"just purchased BP monitor\".  Virginia also request advise on Omeprazole as  she was told it interacts with plavix; she states pt previously on pantoprazole.  ED precautions noted for worsening symptoms.  She verbalizes understanding and states no other concerns or questions at this time.  Virginia is appreciative of information given.    "

## 2022-11-29 ENCOUNTER — TELEPHONE (OUTPATIENT)
Dept: CARDIOLOGY | Facility: MEDICAL CENTER | Age: 67
End: 2022-11-29
Payer: MEDICARE

## 2022-11-29 NOTE — TELEPHONE ENCOUNTER
Returned Sylvia's call, [phone], requesting clarification on duplicate orders received from us and patient's PCP for Imdur. Advice given to pharmacist to follow up with PCP and reviewed last OV note with VR on 11/22/2022. Pharmacist stated that patient was also requesting advice on omeprazole interacting with plavix.     To VR: Patient also requesting your advise on omeprazole interacting with plavix.  Thank you!

## 2022-11-29 NOTE — TELEPHONE ENCOUNTER
ERMA    Caller: - Sylvia [facility]    Topic/issue: has questions on Medications and dosages,  needs clarification.     Callback Number: [phone]     Thank you,    Katie ELDRIDGE

## 2022-11-29 NOTE — TELEPHONE ENCOUNTER
Medical Advice  (Newest Message First)   Claudio Laurent M.D.  You 23 minutes ago (3:47 PM)     Resume chlorthalidone 25mg daily. If blood pressure remains elevated after 5-7 days can increase chlorthalidone to twice daily. Has chest pressure improved on higher dose imdur? Thank you!        Upon chart review, Chlorthalidone still active on MAR.     Called [place], [phone], to update on new orders. Advised to call us if blood pressure remains elevated after 5-7 days to increase prescription of Chlorthalidone. Virginia states that chest pressure and dizziness has resolved with increased Imdur.     All concerns/questions answered at this time, and appreciated information given.

## 2022-11-30 ENCOUNTER — PATIENT MESSAGE (OUTPATIENT)
Dept: CARDIOLOGY | Facility: MEDICAL CENTER | Age: 67
End: 2022-11-30
Payer: MEDICARE

## 2022-11-30 NOTE — TELEPHONE ENCOUNTER
Claudio Laurent M.D.  Jenny Eric R.N. 11 hours ago (12:02 AM)     Can continue omeprazole      MyChart message sent informing patient that Omeprazole is okay to continue taking as prescribed.

## 2022-12-01 RX ORDER — CHLORTHALIDONE 25 MG/1
12.5 TABLET ORAL DAILY
Qty: 30 TABLET | Refills: 0 | COMMUNITY
Start: 2022-12-01 | End: 2022-12-13 | Stop reason: SDUPTHER

## 2022-12-01 NOTE — TELEPHONE ENCOUNTER
VR    Caller: Johanna    Reported Symptoms: Pt's spouse stated his bp has been good in the morning, but in the afternoon it drops.    Recent Blood Pressure/Heart Rate Readin/72    Callback Number: [phone]

## 2022-12-01 NOTE — TELEPHONE ENCOUNTER
Called [place] [phone]  Virginia reports pt BP drops in the afternoon. 11/30/22 15:30 BP 85/72 HR 84. Today 10:15am /98 HR 80. Explained possibility of afternoon dehydration, Virginia states pt IS adequately hydrating. Virginia reports pt taking Lisinopril, Coreg, Flomax, and Imdur at 3:00pm daily. Virginia believes pt is taking too much medication in the afternoon. Virginia reports chest pressure and dizziness has now resolved.         To  for recommendations

## 2022-12-01 NOTE — TELEPHONE ENCOUNTER
------------------------  Called Virginia [phone]  Relayed VR recommendations. Virginia in agreement and agrees to contact clinic office if BP does not improve. Virginia verbalized understanding.        MAR updated      chlorthalidone (HYGROTON) 25 MG Tab 30 Tablet 0/0 12/1/2022     Sig - Route: Take 0.5 Tablets by mouth every day. - Oral    Class: Historical Med    Notes to Pharmacy: Dosage change per VR. See tele encounter dated 12/1/22.

## 2022-12-06 ENCOUNTER — TELEPHONE (OUTPATIENT)
Dept: CARDIOLOGY | Facility: MEDICAL CENTER | Age: 67
End: 2022-12-06
Payer: MEDICARE

## 2022-12-06 DIAGNOSIS — I10 PRIMARY HYPERTENSION: ICD-10-CM

## 2022-12-06 NOTE — TELEPHONE ENCOUNTER
VR    Caller: Jeni Beckham ()    Medication Questions: BP MEDS    Reporting Symptoms: LOW BP    Callback Number: [phone] ([place])

## 2022-12-06 NOTE — PATIENT COMMUNICATION
Clarified with patient on taking Omeprazole. All questions/concerns answered at this time, patient appreciative of information given.

## 2022-12-07 NOTE — TELEPHONE ENCOUNTER
"Called Virginia [phone]  Virginia reports the following home readings:  12/6/22  3:30pm   BP 83/69 HR 86  12/4/22 10:00am  /95 HR 86    Explained to Virginia that low readings in the afternoons could be a symptom of dehydration.   Virginia then became very agitated and would not continue to discuss with RN. Virginia stated: \"he drinks all the time, I watch him. He's drinking tea right now\". Advised Virginia that message will be sent to  for recommendations.        To VR for recommendations  "

## 2022-12-12 NOTE — TELEPHONE ENCOUNTER
Phone Number Called: [phone]    Call outcome: Did not leave a detailed message. Requested patient to call back.    Message: Called to inform patient of VR recommendations.

## 2022-12-12 NOTE — TELEPHONE ENCOUNTER
VR    Caller: Virginia (Sig other)    Topic/issue: Virginia was returning your call from this morning. Tried your ext, but no one answered.    Callback Number: [phone]

## 2022-12-13 RX ORDER — CHLORTHALIDONE 25 MG/1
12.5 TABLET ORAL
Qty: 30 TABLET | Refills: 0 | Status: SHIPPED | OUTPATIENT
Start: 2022-12-13 | End: 2024-01-05

## 2022-12-13 NOTE — TELEPHONE ENCOUNTER
Phone Number Called: [phone]    Call outcome: Did not leave a detailed message. Requested patient to call back.    Message: Called to relay VR recommendations.

## 2022-12-13 NOTE — TELEPHONE ENCOUNTER
Phone Number Called: [phone]    Call outcome: Spoke to patient spouse regarding message below.    Message: Called to discuss VR recommendations. She verbalized understanding. Answered all questions and concerns, appreciative of call.       Medication list updated.

## 2022-12-13 NOTE — TELEPHONE ENCOUNTER
VR    Caller: Jeni Beckham ([place])    Topic/issue: RETURNING RN CALL    Virginia would like a call back to discuss patient low BP and a possible change in BP medication. Please advise.    Thank you,  Jack NEWMAN    Callback Number: [phone] (home)

## 2022-12-13 NOTE — TELEPHONE ENCOUNTER
VR    Caller: [place] (Sig other)    Topic/issue: Virginia was returning your call and apologized as she missed it while giving pt a shower. Tried your ext you're at today and no one answered.    Callback Number: [phone]

## 2023-01-20 DIAGNOSIS — I25.708 CORONARY ARTERY DISEASE INVOLVING CORONARY BYPASS GRAFT WITH OTHER FORMS OF ANGINA PECTORIS, UNSPECIFIED WHETHER NATIVE OR TRANSPLANTED HEART (HCC): ICD-10-CM

## 2023-01-20 RX ORDER — CARVEDILOL 25 MG/1
25 TABLET ORAL 2 TIMES DAILY WITH MEALS
Qty: 180 TABLET | Refills: 3 | Status: SHIPPED | OUTPATIENT
Start: 2023-01-20 | End: 2023-09-19 | Stop reason: SDUPTHER

## 2023-01-20 NOTE — TELEPHONE ENCOUNTER
Is the patient due for a refill? Yes    Was the patient seen the past year? Yes    Date of last office visit: 11/22/22    Does the patient have an upcoming appointment?  Yes   If yes, When? 7/6/23    Provider to refill:VR    Does the patients insurance require a 100 day supply?  No

## 2023-04-11 ENCOUNTER — HOSPITAL ENCOUNTER (OUTPATIENT)
Dept: RADIOLOGY | Facility: MEDICAL CENTER | Age: 68
End: 2023-04-11
Attending: INTERNAL MEDICINE
Payer: MEDICARE

## 2023-04-11 DIAGNOSIS — I73.9 PAD (PERIPHERAL ARTERY DISEASE) (HCC): ICD-10-CM

## 2023-04-11 PROCEDURE — 93922 UPR/L XTREMITY ART 2 LEVELS: CPT | Mod: 26 | Performed by: INTERNAL MEDICINE

## 2023-04-11 PROCEDURE — 93922 UPR/L XTREMITY ART 2 LEVELS: CPT

## 2023-04-12 ENCOUNTER — TELEPHONE (OUTPATIENT)
Dept: CARDIOLOGY | Facility: MEDICAL CENTER | Age: 68
End: 2023-04-12
Payer: MEDICARE

## 2023-06-27 ENCOUNTER — TELEPHONE (OUTPATIENT)
Dept: CARDIOLOGY | Facility: MEDICAL CENTER | Age: 68
End: 2023-06-27

## 2023-06-27 DIAGNOSIS — I20.0 UNSTABLE ANGINA (HCC): ICD-10-CM

## 2023-06-27 DIAGNOSIS — I20.9 ANGINA PECTORIS (HCC): ICD-10-CM

## 2023-06-27 NOTE — TELEPHONE ENCOUNTER
VR    Caller: Jeni Beckham (Spouse)     Medication Name and Dosage: All medication prescribed by VR      Medication amount left: 8 days for the nitroglycerin low on the others.     Preferred Pharmacy: [facility]- On file     Other questions (Topic): Pharmacy told spouse they were unable to reach us to get these medications refilled.     Callback Number (Will only call for issues): [phone] (home) [phone] (work)     Thank you,     Kavon GUERRA

## 2023-07-03 NOTE — TELEPHONE ENCOUNTER
Phone Number Called: [phone]    Call outcome: LVM for pt to call us back    Message:     LVM for pt to call back with which medications he is needing refills on, since the other meds prescribed by VR should still have remaining refills (since they were sent for a one year supply in Nov 2022-imdur, lisinopril and Jan 2023-carvedilol).       To VR: can you please refill pt's nitro, if appropriate? I am unable to refill, as it is not in protocol. Prepped for you using previous refill order. Thanks!

## 2023-07-11 ENCOUNTER — TELEPHONE (OUTPATIENT)
Dept: CARDIOLOGY | Facility: MEDICAL CENTER | Age: 68
End: 2023-07-11
Payer: MEDICARE

## 2023-07-11 NOTE — TELEPHONE ENCOUNTER
VR          Caller: [place](pt's s/o)      Topic/issue: Patient's s/o was asking for a call back to discuss his medications and go over the doses      Callback Number: [phone]      Thank you    -Ismael MEJIA

## 2023-07-19 NOTE — TELEPHONE ENCOUNTER
Called [place] [phone]  No answer, left  to call [phone]  Regarding medication questions

## 2023-07-20 NOTE — TELEPHONE ENCOUNTER
Called [place] [phone]  All medication questions answered. Encouraged Virginia to call clinic for any further questions. [place] verbalized understanding and is appreciative of call back.

## 2023-08-07 RX ORDER — NITROGLYCERIN 0.4 MG/1
0.4 TABLET SUBLINGUAL
Qty: 25 TABLET | Refills: 2 | Status: SHIPPED | OUTPATIENT
Start: 2023-08-07 | End: 2023-09-19 | Stop reason: SDUPTHER

## 2023-09-19 ENCOUNTER — OFFICE VISIT (OUTPATIENT)
Dept: CARDIOLOGY | Facility: MEDICAL CENTER | Age: 68
End: 2023-09-19
Payer: MEDICARE

## 2023-09-19 VITALS
SYSTOLIC BLOOD PRESSURE: 110 MMHG | DIASTOLIC BLOOD PRESSURE: 74 MMHG | BODY MASS INDEX: 23.54 KG/M2 | WEIGHT: 150 LBS | OXYGEN SATURATION: 99 % | HEIGHT: 67 IN | HEART RATE: 73 BPM

## 2023-09-19 DIAGNOSIS — I20.0 UNSTABLE ANGINA (HCC): ICD-10-CM

## 2023-09-19 DIAGNOSIS — I25.708 CORONARY ARTERY DISEASE INVOLVING CORONARY BYPASS GRAFT WITH OTHER FORMS OF ANGINA PECTORIS, UNSPECIFIED WHETHER NATIVE OR TRANSPLANTED HEART (HCC): ICD-10-CM

## 2023-09-19 DIAGNOSIS — I49.5 SICK SINUS SYNDROME (HCC): ICD-10-CM

## 2023-09-19 DIAGNOSIS — J44.9 CHRONIC OBSTRUCTIVE PULMONARY DISEASE, UNSPECIFIED COPD TYPE (HCC): ICD-10-CM

## 2023-09-19 DIAGNOSIS — I20.9 ANGINA PECTORIS (HCC): ICD-10-CM

## 2023-09-19 DIAGNOSIS — E78.5 HYPERLIPIDEMIA, UNSPECIFIED HYPERLIPIDEMIA TYPE: ICD-10-CM

## 2023-09-19 DIAGNOSIS — R63.4 UNINTENDED WEIGHT LOSS: ICD-10-CM

## 2023-09-19 DIAGNOSIS — I10 PRIMARY HYPERTENSION: ICD-10-CM

## 2023-09-19 DIAGNOSIS — Z95.0 CARDIAC PACEMAKER IN SITU: ICD-10-CM

## 2023-09-19 DIAGNOSIS — R11.15 CYCLICAL VOMITING SYNDROME NOT ASSOCIATED WITH MIGRAINE: ICD-10-CM

## 2023-09-19 DIAGNOSIS — Z95.5 HISTORY OF PLACEMENT OF STENT IN LAD CORONARY ARTERY: ICD-10-CM

## 2023-09-19 DIAGNOSIS — Z95.1 S/P 2-VESSEL CORONARY ARTERY BYPASS: ICD-10-CM

## 2023-09-19 DIAGNOSIS — I70.0 AORTIC ATHEROSCLEROSIS (HCC): ICD-10-CM

## 2023-09-19 PROBLEM — A41.9 SEPSIS (HCC): Status: RESOLVED | Noted: 2022-01-03 | Resolved: 2023-09-19

## 2023-09-19 PROCEDURE — 3078F DIAST BP <80 MM HG: CPT

## 2023-09-19 PROCEDURE — 99214 OFFICE O/P EST MOD 30 MIN: CPT

## 2023-09-19 PROCEDURE — 99213 OFFICE O/P EST LOW 20 MIN: CPT

## 2023-09-19 PROCEDURE — 3074F SYST BP LT 130 MM HG: CPT

## 2023-09-19 RX ORDER — ATORVASTATIN CALCIUM 40 MG/1
40 TABLET, FILM COATED ORAL DAILY
Qty: 100 TABLET | Refills: 3 | Status: SHIPPED | OUTPATIENT
Start: 2023-09-19 | End: 2024-01-05 | Stop reason: SDUPTHER

## 2023-09-19 RX ORDER — ISOSORBIDE MONONITRATE 120 MG/1
120 TABLET, EXTENDED RELEASE ORAL 2 TIMES DAILY
Qty: 200 TABLET | Refills: 3 | Status: SHIPPED | OUTPATIENT
Start: 2023-09-19 | End: 2024-01-05 | Stop reason: SDUPTHER

## 2023-09-19 RX ORDER — NITROGLYCERIN 0.4 MG/1
0.4 TABLET SUBLINGUAL
Qty: 30 TABLET | Refills: 11 | Status: SHIPPED | OUTPATIENT
Start: 2023-09-19 | End: 2024-01-05 | Stop reason: SDUPTHER

## 2023-09-19 RX ORDER — LISINOPRIL 40 MG/1
20 TABLET ORAL 2 TIMES DAILY
Qty: 100 TABLET | Refills: 3 | Status: SHIPPED | OUTPATIENT
Start: 2023-09-19 | End: 2024-01-05 | Stop reason: SDUPTHER

## 2023-09-19 RX ORDER — CLOPIDOGREL BISULFATE 75 MG/1
75 TABLET ORAL EVERY MORNING
Qty: 100 TABLET | Refills: 3 | Status: SHIPPED | OUTPATIENT
Start: 2023-09-19 | End: 2024-01-05 | Stop reason: SDUPTHER

## 2023-09-19 RX ORDER — CARVEDILOL 25 MG/1
25 TABLET ORAL 2 TIMES DAILY WITH MEALS
Qty: 200 TABLET | Refills: 3 | Status: SHIPPED | OUTPATIENT
Start: 2023-09-19 | End: 2024-01-05 | Stop reason: SDUPTHER

## 2023-09-19 ASSESSMENT — ENCOUNTER SYMPTOMS
MUSCULOSKELETAL NEGATIVE: 1
EYES NEGATIVE: 1
SHORTNESS OF BREATH: 1
DEPRESSION: 0
PND: 0
ORTHOPNEA: 0
NERVOUS/ANXIOUS: 0
NEUROLOGICAL NEGATIVE: 1
WEIGHT LOSS: 1
PALPITATIONS: 0
VOMITING: 1
NAUSEA: 1

## 2023-09-19 ASSESSMENT — FIBROSIS 4 INDEX: FIB4 SCORE: 2.49

## 2023-09-19 NOTE — PROGRESS NOTES
"Chief Complaint   Patient presents with    Hypertension     F/V DX: Primary hypertension    Other     F/V DX: PAD (peripheral artery disease) (Formerly [facility])       Subjective     Antony Akbar Sr. is a 68 y.o. male who presents today for follow up. They have a history of hypothyroidism, COPD, HTN, CAD/PCI s/p CABG 2012, carotid stenosis, PVD AGUSTIN s/p PCI, HLD, SSS s/p PPM, additionally he had an admission in January 2022 for an NSTEMI, underwent left heart cath without intervention due to preserved flow    Last seen by Dr. Laurent on 11/22/2022, that visit he has been intolerant to Ranexa, he was having ongoing chest pressure with exertion, his Imdur dose was increased, recommended to get pacemaker check    Today 9/19/2023 he is not doing well, he has been having vomiting every day for about a year, he has also had unplanned weight loss as a result.  He says that his pacer feels like it has migrated laterally, sometimes feels a \"shock.\" He had been put on hospice when he lived in [place] \"because of my heart.\"  He has COPD but has not been to a pulmonologist since moving to [place]. He does have stable angina, relieved with Imdur and sublingual nitroglycerin, mild lower extremity edema.    Activity: uses four-wheel walker, able to walk for about 20 minutes before having to stop and rest.     Past Medical History:   Diagnosis Date    Asthma     Back pain     BPH (benign prostatic hyperplasia)     Chronic obstructive pulmonary disease (HCC)     Hypertension     Hypothyroid      Past Surgical History:   Procedure Laterality Date    PB OPEN FIX INTER/SUBTROCH FX,IMPLNT Left 8/4/2021    Procedure: INSERTION, INTRAMEDULLARY MIKAEL, FEMUR, PROXIMAL;  Surgeon: Valentin Ríos M.D.;  Location: SURGERY [facility];  Service: Orthopedics    PACEMAKER INSERTION Left 12/04/2020    Generator replacement with [facility] Corrine XT DR URIAS [id number] implanted in [place]. Original device implanted in 2011.    CARDIAC CATH, RIGHT/LEFT HEART   "     History reviewed. No pertinent family history.  Social History     Socioeconomic History    Marital status: Single     Spouse name: Not on file    Number of children: Not on file    Years of education: Not on file    Highest education level: Not on file   Occupational History    Not on file   Tobacco Use    Smoking status: Former     Current packs/day: 0.00     Types: Cigarettes     Quit date: 1995     Years since quittin.7    Smokeless tobacco: Never   Vaping Use    Vaping Use: Never used   Substance and Sexual Activity    Alcohol use: Not Currently    Drug use: Not Currently    Sexual activity: Not on file   Other Topics Concern    Not on file   Social History Narrative    Not on file     Social Determinants of Health     Financial Resource Strain: Not on file   Food Insecurity: Not on file   Transportation Needs: Not on file   Physical Activity: Not on file   Stress: Not on file   Social Connections: Not on file   Intimate Partner Violence: Not on file   Housing Stability: Not on file     Allergies   Allergen Reactions    Bee Venom Anaphylaxis     Outpatient Encounter Medications as of 2023   Medication Sig Dispense Refill    atorvastatin (LIPITOR) 40 MG Tab Take 1 Tablet by mouth every day. 100 Tablet 3    carvedilol (COREG) 25 MG Tab Take 1 Tablet by mouth 2 times a day with meals. 200 Tablet 3    clopidogrel (PLAVIX) 75 MG Tab Take 1 Tablet by mouth every morning. 100 Tablet 3    isosorbide mononitrate (IMDUR) 120 MG CR tablet Take 1 Tablet by mouth 2 times a day. 200 Tablet 3    nitroglycerin (NITROSTAT) 0.4 MG SL Tab Place 1 Tablet under the tongue 1 time a day as needed for Chest Pain (chest pain). 30 Tablet 11    lisinopril (PRINIVIL) 40 MG tablet Take 0.5 Tablets by mouth 2 times a day. 100 Tablet 3    buPROPion SR (WELLBUTRIN-SR) 150 MG TABLET SR 12 HR sustained-release tablet       ondansetron (ZOFRAN ODT) 4 MG TABLET DISPERSIBLE       GNP CLEARLAX 17 GM/SCOOP Powder       omeprazole  (PRILOSEC) 20 MG delayed-release capsule Take 1 Capsule by mouth every day. 90 Capsule 3    levothyroxine (SYNTHROID) 50 MCG Tab Take 50 mcg by mouth every morning on an empty stomach.      morphine ER (MS CONTIN) 15 MG Tab CR tablet Take 15 mg by mouth 3 times a day as needed (Pain).      vitamin D3 (CHOLECALCIFEROL) 1000 Unit (25 mcg) Tab Take 1,000 Units by mouth every day.      cyclobenzaprine (FLEXERIL) 10 mg Tab Take 1 Tablet by mouth 3 times a day as needed for Moderate Pain. 30 Tablet 0    tamsulosin (FLOMAX) 0.4 MG capsule Take 1 Capsule by mouth every evening. 90 Capsule 2    gabapentin (NEURONTIN) 100 MG Cap Take 100 mg by mouth 2 times a day.      EPINEPHrine (EPIPEN) 0.3 MG/0.3ML Solution Auto-injector solution for injection Inject 0.3 mg into the shoulder, thigh, or buttocks as needed.      albuterol 108 (90 Base) MCG/ACT Aero Soln inhalation aerosol Inhale 2 Puffs every 6 hours as needed for Shortness of Breath.      [DISCONTINUED] nitroglycerin (NITROSTAT) 0.4 MG SL Tab Place 1 Tablet under the tongue 1 time a day as needed for Chest Pain (chest pain). 25 Tablet 2    [DISCONTINUED] isosorbide mononitrate (IMDUR) 120 MG CR tablet Take 1 Tablet by mouth 2 times a day. 180 Tablet 4    [DISCONTINUED] carvedilol (COREG) 25 MG Tab TAKE 1 TABLET BY MOUTH 2 TIMES A DAY WITH MEALS. 180 Tablet 3    chlorthalidone (HYGROTON) 25 MG Tab Take 0.5 Tablets by mouth every 48 hours. (Patient not taking: Reported on 9/19/2023) 30 Tablet 0    [DISCONTINUED] ALPRAZolam (XANAX) 0.25 MG Tab  (Patient not taking: Reported on 9/19/2023)      [DISCONTINUED] lisinopril (PRINIVIL) 40 MG tablet Take 0.5 Tablets by mouth 2 times a day. 180 Tablet 3    [DISCONTINUED] aspirin 81 MG EC tablet Take 1 Tablet by mouth every day. (Patient not taking: Reported on 9/19/2023) 100 Tablet 1    [DISCONTINUED] atorvastatin (LIPITOR) 40 MG Tab Take 1 Tablet by mouth every day. 90 Tablet 2    [DISCONTINUED] clopidogrel (PLAVIX) 75 MG Tab Take 1  "Tablet by mouth every morning. 90 Tablet 2     No facility-administered encounter medications on file as of 9/19/2023.     Review of Systems   Constitutional:  Positive for malaise/fatigue and weight loss.   HENT: Negative.     Eyes: Negative.    Respiratory:  Positive for shortness of breath.    Cardiovascular:  Positive for chest pain and leg swelling. Negative for palpitations, orthopnea and PND.   Gastrointestinal:  Positive for nausea and vomiting.   Genitourinary: Negative.    Musculoskeletal: Negative.    Skin: Negative.    Neurological: Negative.    Endo/Heme/Allergies: Negative.    Psychiatric/Behavioral:  Negative for depression. The patient is not nervous/anxious.               Objective     /74 (BP Location: Left arm, Patient Position: Sitting, BP Cuff Size: Adult)   Pulse 73   Ht 1.702 m (5' 7\")   Wt 68 kg (150 lb)   SpO2 99%   BMI 23.49 kg/m²     Physical Exam  Constitutional:       Appearance: Normal appearance.   HENT:      Head: Normocephalic.   Neck:      Vascular: No JVD.   Cardiovascular:      Rate and Rhythm: Normal rate and regular rhythm.      Pulses: Normal pulses.      Heart sounds: Normal heart sounds. No murmur heard.     No friction rub.   Pulmonary:      Effort: Pulmonary effort is normal.      Breath sounds: Normal breath sounds.   Abdominal:      Palpations: Abdomen is soft.   Musculoskeletal:         General: Normal range of motion.      Right lower leg: No edema.      Left lower leg: No edema.   Skin:     General: Skin is warm and dry.   Neurological:      General: No focal deficit present.      Mental Status: He is alert and oriented to person, place, and time.   Psychiatric:         Mood and Affect: Mood normal.         Behavior: Behavior normal.            Lab Results   Component Value Date/Time    CHOLSTRLTOT 103 01/04/2022 03:05 AM    LDL 43 01/04/2022 03:05 AM    HDL 33 (A) 01/04/2022 03:05 AM    TRIGLYCERIDE 135 01/04/2022 03:05 AM       Lab Results   Component " Value Date/Time    SODIUM 137 01/06/2022 05:22 AM    POTASSIUM 4.0 01/06/2022 05:22 AM    CHLORIDE 104 01/06/2022 05:22 AM    CO2 20 01/06/2022 05:22 AM    GLUCOSE 71 01/06/2022 05:22 AM    BUN 8 01/06/2022 05:22 AM    CREATININE 0.88 01/06/2022 05:22 AM     Lab Results   Component Value Date/Time    ALKPHOSPHAT 110 (H) 01/04/2022 03:05 AM    ASTSGOT 14 01/04/2022 03:05 AM    ALTSGPT 8 01/04/2022 03:05 AM    TBILIRUBIN 0.6 01/04/2022 03:05 AM        Angiogram 1/5/2022  IMPRESSIONS:  1. Non-coronary source of chest pain  2. Severe native one vessel CAD  3. 1/2 patent bypass grafts  4. Normal LVEDP  5. Incidental note of severe PAD in the R SFA    Findings   Hemodynamics:   Aorta: 138/72 mmHg  LVEDP: 7 mmHg  No significant pullback gradient across the aortic valve     Coronary Anatomy              Left Main: Diffuse 30% stenosis              LAD: Patent proximal stent. The LAD is congenitally of small caliber (1.5-2.0 mm). The first diagonal is grafted and proximal 90% stenosis.               LCx: Co-dominant, proximal 20% , mid 30% stenosis. The OM is large and has proximal 20% stenosis, the left PLB is normal. The left PDA is small and normal              RCA: Co-dominant, small vessel with ostial 50% stenosis. iFR is 0.96. The mid RCA has 40% stenosis. The PDA branches from the mid right and is normal.                 Grafts              LIMA-diagonal: Patent              VG-unknown - occluded at the anastomosis    TTE 2/25/22  CONCLUSIONS  Normal left ventricular size, wall thickness, systolic, and diastolic   function.  Normal right ventricular size and systolic function.  Normal left atrial size.  No significant valvular pathology.  No pericardial effusion.  Left Ventricle  Normal left ventricular chamber size. Normal left ventricular wall   thickness. Normal left ventricular systolic function. The left   ventricular ejection fraction is visually estimated to be 55%. Normal   regional wall motion. Normal  diastolic function.     AAA screen 2/25/22  CONCLUSIONS   No evidence of abdominal aortic or iliac artery aneurysm.    Abdominal aorta and bilateral iliac arteries with no hemodynamically    significant stenosis. Diffuse atherosclerotic plaque.    Assessment & Plan     1. S/P 2-vessel coronary artery bypass        2. History of placement of stent in LAD coronary artery        3. Cyclical vomiting syndrome not associated with migraine  Referral to Gastroenterology      4. Cardiac pacemaker in situ  Pacemaker Check      5. Hyperlipidemia, unspecified hyperlipidemia type  Lipid Profile      6. Primary hypertension  atorvastatin (LIPITOR) 40 MG Tab    clopidogrel (PLAVIX) 75 MG Tab    lisinopril (PRINIVIL) 40 MG tablet      7. Angina pectoris (HCC)  isosorbide mononitrate (IMDUR) 120 MG CR tablet    nitroglycerin (NITROSTAT) 0.4 MG SL Tab      8. Chronic obstructive pulmonary disease, unspecified COPD type (HCC)  Referral to Pulmonary and Sleep Medicine      9. Sick sinus syndrome (HCC)        10. Aortic atherosclerosis (HCC)        11. Coronary artery disease involving coronary bypass graft with other forms of angina pectoris, unspecified whether native or transplanted heart (HCC)  carvedilol (COREG) 25 MG Tab      12. Unstable angina (HCC)  nitroglycerin (NITROSTAT) 0.4 MG SL Tab          Medical Decision Making: Today's Assessment/Status/Plan:        Coronary Artery Disease and aortic atherosclerosis  With history of bypass x2, and stent placement  - continue Plavix 75 mg daily, carvedilol 25 mg twice daily, Lipitor 40 mg daily, Imdur 120 mg twice daily, sublingual nitroglycerin as needed (he reports using it about 5 times a week)  -As noted above he does have stable angina  We addressed the management of atherosclerotic artery disease.  He is on proper antiplatelet, cholesterol management and beta-blockers as appropriate.  We addressed the potential side effects and laboratory follow-up for these  "medications.    Sick sinus syndrome status post pacemaker  -He is concerned that his pacer has migrated laterally  -On exam pacer appears to be in the appropriate place, I suspect if it has moved [facility] it may be due to his significant weight loss  -Reporting feelings of \"shock\"  -He has not had a pacer check since living in [place]  -Ordered pacer check, should be done at regular intervals    Hypertension  -Good control, continue current regimen, goal of less than 130/80  -He takes his lisinopril in 2 divided doses    Hyperlipidemia  -Most recent LDL 43  -Continue atorvastatin 40 mg daily  -Goal of less than 70  -Check lipid panel     Unplanned weight loss and cyclical vomiting  -Ongoing for the past year  -He does smoke marijuana occasionally, tried 3 weeks without it without change in his symptoms  -Referral to GI and dietary    COPD  -Has not seen a pulmonologist since moving to [place], referral placed    Follow-up with JOON Sanchez in 3 months    This note was dictated using Dragon speech recognition software.                          "

## 2023-09-26 ENCOUNTER — TELEPHONE (OUTPATIENT)
Dept: HEALTH INFORMATION MANAGEMENT | Facility: OTHER | Age: 68
End: 2023-09-26

## 2023-10-10 ENCOUNTER — NON-PROVIDER VISIT (OUTPATIENT)
Dept: CARDIOLOGY | Facility: MEDICAL CENTER | Age: 68
End: 2023-10-10
Payer: MEDICARE

## 2023-10-10 ENCOUNTER — NON-PROVIDER VISIT (OUTPATIENT)
Dept: CARDIOLOGY | Facility: MEDICAL CENTER | Age: 68
End: 2023-10-10

## 2023-10-10 DIAGNOSIS — I24.0 ISCHEMIC HEART DISEASE DUE TO CORONARY ARTERY OBSTRUCTION (HCC): ICD-10-CM

## 2023-10-10 DIAGNOSIS — Z95.0 CARDIAC PACEMAKER IN SITU: ICD-10-CM

## 2023-10-10 DIAGNOSIS — I25.9 ISCHEMIC HEART DISEASE DUE TO CORONARY ARTERY OBSTRUCTION (HCC): ICD-10-CM

## 2023-10-10 DIAGNOSIS — I49.5 SICK SINUS SYNDROME (HCC): ICD-10-CM

## 2023-10-10 PROCEDURE — 93280 PM DEVICE PROGR EVAL DUAL: CPT | Performed by: INTERNAL MEDICINE

## 2023-10-11 NOTE — CARDIAC REMOTE MONITOR - SCAN
Device transmission reviewed. Device demonstrated appropriate function.       Electronically Signed by: Marino Seth M.D.    10/24/2023  3:34 PM

## 2023-10-24 PROCEDURE — 93280 PM DEVICE PROGR EVAL DUAL: CPT | Mod: 26 | Performed by: INTERNAL MEDICINE

## 2023-12-27 ENCOUNTER — TELEPHONE (OUTPATIENT)
Dept: CARDIOLOGY | Facility: MEDICAL CENTER | Age: 68
End: 2023-12-27
Payer: MEDICARE

## 2023-12-27 NOTE — TELEPHONE ENCOUNTER
Chart Prep: called patient in regards to labs needing to be completed prior to appointment on 1/5/23. Faxed lab order to [facility]. Request and confirmation scanned into [employer].

## 2023-12-28 DIAGNOSIS — E78.5 HYPERLIPIDEMIA, UNSPECIFIED HYPERLIPIDEMIA TYPE: ICD-10-CM

## 2023-12-28 LAB
CHOLEST SERPL-MCNC: 129 MG/DL
HDLC SERPL-MCNC: 55 MG/DL
LDLC SERPL CALC-MCNC: 64 MG/DL
TRIGL SERPL-MCNC: 52 MG/DL

## 2024-01-05 ENCOUNTER — OFFICE VISIT (OUTPATIENT)
Dept: CARDIOLOGY | Facility: MEDICAL CENTER | Age: 69
End: 2024-01-05
Payer: MEDICARE

## 2024-01-05 VITALS
BODY MASS INDEX: 24.47 KG/M2 | WEIGHT: 155.9 LBS | HEART RATE: 73 BPM | HEIGHT: 67 IN | OXYGEN SATURATION: 96 % | SYSTOLIC BLOOD PRESSURE: 102 MMHG | DIASTOLIC BLOOD PRESSURE: 70 MMHG

## 2024-01-05 DIAGNOSIS — Z95.5 HISTORY OF PLACEMENT OF STENT IN LAD CORONARY ARTERY: ICD-10-CM

## 2024-01-05 DIAGNOSIS — I10 PRIMARY HYPERTENSION: ICD-10-CM

## 2024-01-05 DIAGNOSIS — E78.5 HYPERLIPIDEMIA, UNSPECIFIED HYPERLIPIDEMIA TYPE: ICD-10-CM

## 2024-01-05 DIAGNOSIS — I25.9 ISCHEMIC HEART DISEASE DUE TO CORONARY ARTERY OBSTRUCTION (HCC): ICD-10-CM

## 2024-01-05 DIAGNOSIS — I20.0 UNSTABLE ANGINA (HCC): ICD-10-CM

## 2024-01-05 DIAGNOSIS — I24.0 ISCHEMIC HEART DISEASE DUE TO CORONARY ARTERY OBSTRUCTION (HCC): ICD-10-CM

## 2024-01-05 DIAGNOSIS — I25.708 CORONARY ARTERY DISEASE INVOLVING CORONARY BYPASS GRAFT WITH OTHER FORMS OF ANGINA PECTORIS, UNSPECIFIED WHETHER NATIVE OR TRANSPLANTED HEART (HCC): ICD-10-CM

## 2024-01-05 DIAGNOSIS — I20.9 ANGINA PECTORIS (HCC): ICD-10-CM

## 2024-01-05 DIAGNOSIS — I70.0 AORTIC ATHEROSCLEROSIS (HCC): ICD-10-CM

## 2024-01-05 DIAGNOSIS — Z95.1 S/P 2-VESSEL CORONARY ARTERY BYPASS: ICD-10-CM

## 2024-01-05 DIAGNOSIS — Z95.0 CARDIAC PACEMAKER IN SITU: ICD-10-CM

## 2024-01-05 PROCEDURE — 99213 OFFICE O/P EST LOW 20 MIN: CPT

## 2024-01-05 PROCEDURE — 3074F SYST BP LT 130 MM HG: CPT

## 2024-01-05 PROCEDURE — 99214 OFFICE O/P EST MOD 30 MIN: CPT

## 2024-01-05 PROCEDURE — 3078F DIAST BP <80 MM HG: CPT

## 2024-01-05 RX ORDER — CLOPIDOGREL BISULFATE 75 MG/1
75 TABLET ORAL EVERY MORNING
Qty: 100 TABLET | Refills: 3 | Status: SHIPPED | OUTPATIENT
Start: 2024-01-05

## 2024-01-05 RX ORDER — ALPRAZOLAM 0.25 MG/1
0.25 TABLET ORAL NIGHTLY PRN
COMMUNITY

## 2024-01-05 RX ORDER — FAMOTIDINE 40 MG/1
40 TABLET, FILM COATED ORAL
COMMUNITY
Start: 2023-11-01

## 2024-01-05 RX ORDER — ATORVASTATIN CALCIUM 40 MG/1
40 TABLET, FILM COATED ORAL DAILY
Qty: 100 TABLET | Refills: 3 | Status: SHIPPED | OUTPATIENT
Start: 2024-01-05

## 2024-01-05 RX ORDER — NITROGLYCERIN 0.4 MG/1
0.4 TABLET SUBLINGUAL
Qty: 30 TABLET | Refills: 11 | Status: SHIPPED | OUTPATIENT
Start: 2024-01-05

## 2024-01-05 RX ORDER — CARVEDILOL 25 MG/1
25 TABLET ORAL 2 TIMES DAILY WITH MEALS
Qty: 200 TABLET | Refills: 3 | Status: SHIPPED | OUTPATIENT
Start: 2024-01-05

## 2024-01-05 RX ORDER — LISINOPRIL 40 MG/1
20 TABLET ORAL 2 TIMES DAILY
Qty: 100 TABLET | Refills: 3 | Status: SHIPPED | OUTPATIENT
Start: 2024-01-05

## 2024-01-05 RX ORDER — ISOSORBIDE MONONITRATE 120 MG/1
120 TABLET, EXTENDED RELEASE ORAL 2 TIMES DAILY
Qty: 200 TABLET | Refills: 3 | Status: SHIPPED | OUTPATIENT
Start: 2024-01-05

## 2024-01-05 ASSESSMENT — ENCOUNTER SYMPTOMS
SHORTNESS OF BREATH: 1
NERVOUS/ANXIOUS: 0
GASTROINTESTINAL NEGATIVE: 1
PALPITATIONS: 0
DEPRESSION: 0
NEUROLOGICAL NEGATIVE: 1
ORTHOPNEA: 0
EYES NEGATIVE: 1
PND: 0
MUSCULOSKELETAL NEGATIVE: 1

## 2024-01-05 NOTE — PROGRESS NOTES
"Chief Complaint   Patient presents with    Hypertension    Coronary Artery Disease     Dx: (peripheral artery disease) (HCC)         Subjective     Antony Akbar Sr. is a 68 y.o. male who presents today for follow up. They have a history of hypothyroidism, COPD, HTN, CAD/PCI s/p CABG 2012, carotid stenosis, PVD AGUSTIN s/p PCI, HLD, SSS s/p PPM, additionally he had an admission in January 2022 for an NSTEMI, underwent left heart cath without intervention due to preserved flow     Seen by Dr. Laurent on 11/22/2022, that visit he has been intolerant to Ranexa, he was having ongoing chest pressure with exertion, his Imdur dose was increased, recommended to get pacemaker check     Today 9/19/2023 he is not doing well, he has been having vomiting every day for about a year, he has also had unplanned weight loss as a result.  He says that his pacer feels like it has migrated laterally, sometimes feels a \"shock.\" He had been put on hospice when he lived in [place] \"because of my heart.\"  He has COPD but has not been to a pulmonologist since moving to [place]. He does have stable angina, relieved with Imdur and sublingual nitroglycerin, mild lower extremity edema. He uses four-wheel walker, able to walk for about 20 minutes before having to stop and rest.     Today 1/5/2024 he was able to follow up with GI, he is going to be getting a colonoscopy and an endoscopy, tentatively scheduled for 2/14/2024. Occasional chest pain, with activity. He is able to walk with his 4 wheel walker for about 20 minutes.     Past Medical History:   Diagnosis Date    Asthma     Back pain     BPH (benign prostatic hyperplasia)     Chronic obstructive pulmonary disease (HCC)     Hypertension     Hypothyroid      Past Surgical History:   Procedure Laterality Date    PB OPEN FIX INTER/SUBTROCH FX,IMPLNT Left 8/4/2021    Procedure: INSERTION, INTRAMEDULLARY MIKAEL, FEMUR, PROXIMAL;  Surgeon: Valentin Ríos M.D.;  Location: SURGERY [facility];  " Service: Orthopedics    PACEMAKER INSERTION Left 2020    Generator replacement with [employer] Corrine XT DR URIAS [id number] implanted in [place]. Original device implanted in .    CARDIAC CATH, RIGHT/LEFT HEART       No family history on file.  Social History     Socioeconomic History    Marital status: Single     Spouse name: Not on file    Number of children: Not on file    Years of education: Not on file    Highest education level: Not on file   Occupational History    Not on file   Tobacco Use    Smoking status: Former     Current packs/day: 0.00     Types: Cigarettes     Quit date: 1995     Years since quittin.0    Smokeless tobacco: Never   Vaping Use    Vaping Use: Never used   Substance and Sexual Activity    Alcohol use: Not Currently    Drug use: Not Currently    Sexual activity: Not on file   Other Topics Concern    Not on file   Social History Narrative    Not on file     Social Determinants of Health     Financial Resource Strain: Not on file   Food Insecurity: Not on file   Transportation Needs: Not on file   Physical Activity: Not on file   Stress: Not on file   Social Connections: Not on file   Intimate Partner Violence: Not on file   Housing Stability: Not on file     Allergies   Allergen Reactions    Bee Venom Anaphylaxis     Outpatient Encounter Medications as of 2024   Medication Sig Dispense Refill    ALPRAZolam (XANAX) 0.25 MG Tab Take 0.25 mg by mouth at bedtime as needed for Sleep.      famotidine (PEPCID) 40 MG Tab 40 mg.      atorvastatin (LIPITOR) 40 MG Tab Take 1 Tablet by mouth every day. 100 Tablet 3    carvedilol (COREG) 25 MG Tab Take 1 Tablet by mouth 2 times a day with meals. 200 Tablet 3    clopidogrel (PLAVIX) 75 MG Tab Take 1 Tablet by mouth every morning. 100 Tablet 3    isosorbide mononitrate (IMDUR) 120 MG CR tablet Take 1 Tablet by mouth 2 times a day. 200 Tablet 3    nitroglycerin (NITROSTAT) 0.4 MG SL Tab Place 1 Tablet under the tongue 1 time a day as needed for  Chest Pain (chest pain). 30 Tablet 11    lisinopril (PRINIVIL) 40 MG tablet Take 0.5 Tablets by mouth 2 times a day. 100 Tablet 3    chlorthalidone (HYGROTON) 25 MG Tab Take 0.5 Tablets by mouth every 48 hours. 30 Tablet 0    buPROPion SR (WELLBUTRIN-SR) 150 MG TABLET SR 12 HR sustained-release tablet       GNP CLEARLAX 17 GM/SCOOP Powder       levothyroxine (SYNTHROID) 50 MCG Tab Take 50 mcg by mouth every morning on an empty stomach.      morphine ER (MS CONTIN) 15 MG Tab CR tablet Take 15 mg by mouth 3 times a day as needed (Pain).      cyclobenzaprine (FLEXERIL) 10 mg Tab Take 1 Tablet by mouth 3 times a day as needed for Moderate Pain. 30 Tablet 0    gabapentin (NEURONTIN) 100 MG Cap Take 100 mg by mouth 2 times a day.      EPINEPHrine (EPIPEN) 0.3 MG/0.3ML Solution Auto-injector solution for injection Inject 0.3 mg into the shoulder, thigh, or buttocks as needed.      albuterol 108 (90 Base) MCG/ACT Aero Soln inhalation aerosol Inhale 2 Puffs every 6 hours as needed for Shortness of Breath.      ondansetron (ZOFRAN ODT) 4 MG TABLET DISPERSIBLE  (Patient not taking: Reported on 1/5/2024)      omeprazole (PRILOSEC) 20 MG delayed-release capsule Take 1 Capsule by mouth every day. (Patient not taking: Reported on 1/5/2024) 90 Capsule 3    vitamin D3 (CHOLECALCIFEROL) 1000 Unit (25 mcg) Tab Take 1,000 Units by mouth every day. (Patient not taking: Reported on 1/5/2024)      tamsulosin (FLOMAX) 0.4 MG capsule Take 1 Capsule by mouth every evening. (Patient not taking: Reported on 1/5/2024) 90 Capsule 2     No facility-administered encounter medications on file as of 1/5/2024.     Review of Systems   Constitutional:  Positive for malaise/fatigue.   HENT: Negative.     Eyes: Negative.    Respiratory:  Positive for shortness of breath.    Cardiovascular:  Positive for chest pain. Negative for palpitations, orthopnea, leg swelling and PND.   Gastrointestinal: Negative.    Genitourinary: Negative.    Musculoskeletal:  "Negative.    Skin: Negative.    Neurological: Negative.    Endo/Heme/Allergies: Negative.    Psychiatric/Behavioral:  Negative for depression. The patient is not nervous/anxious.               Objective     /70 (BP Location: Left arm, Patient Position: Sitting, BP Cuff Size: Adult)   Pulse 73   Ht 1.702 m (5' 7\")   Wt 70.7 kg (155 lb 14.4 oz)   SpO2 96%   BMI 24.42 kg/m²     Physical Exam  Constitutional:       Appearance: Normal appearance.   HENT:      Head: Normocephalic.   Neck:      Vascular: No JVD.   Cardiovascular:      Rate and Rhythm: Normal rate and regular rhythm.      Pulses: Normal pulses.      Heart sounds: Normal heart sounds. No murmur heard.     No friction rub.   Pulmonary:      Effort: Pulmonary effort is normal.      Breath sounds: Normal breath sounds.   Abdominal:      Palpations: Abdomen is soft.   Musculoskeletal:         General: Normal range of motion.      Right lower leg: No edema.      Left lower leg: No edema.   Skin:     General: Skin is warm and dry.   Neurological:      General: No focal deficit present.      Mental Status: He is alert and oriented to person, place, and time.   Psychiatric:         Mood and Affect: Mood normal.         Behavior: Behavior normal.            Lab Results   Component Value Date/Time    CHOLSTRLTOT 129 12/28/2023 12:00 AM    LDL 64 12/28/2023 12:00 AM    HDL 55 12/28/2023 12:00 AM    TRIGLYCERIDE 52 12/28/2023 12:00 AM       Lab Results   Component Value Date/Time    SODIUM 137 01/06/2022 05:22 AM    POTASSIUM 4.0 01/06/2022 05:22 AM    CHLORIDE 104 01/06/2022 05:22 AM    CO2 20 01/06/2022 05:22 AM    GLUCOSE 71 01/06/2022 05:22 AM    BUN 8 01/06/2022 05:22 AM    CREATININE 0.88 01/06/2022 05:22 AM     Lab Results   Component Value Date/Time    ALKPHOSPHAT 110 (H) 01/04/2022 03:05 AM    ASTSGOT 14 01/04/2022 03:05 AM    ALTSGPT 8 01/04/2022 03:05 AM    TBILIRUBIN 0.6 01/04/2022 03:05 AM          Assessment & Plan     1. History of placement of " stent in LAD coronary artery        2. S/P 2-vessel coronary artery bypass        3. Angina pectoris (HCC)        4. Aortic atherosclerosis (HCC)        5. Cardiac pacemaker in situ        6. Hyperlipidemia, unspecified hyperlipidemia type        7. Ischemic heart disease due to coronary artery obstruction (HCC)        8. Primary hypertension            Medical Decision Making: Today's Assessment/Status/Plan:        Coronary Artery Disease and aortic atherosclerosis  With history of bypass x2, and stent placement  - continue Plavix 75 mg daily, carvedilol 25 mg twice daily, Lipitor 40 mg daily, Imdur 120 mg twice daily, sublingual nitroglycerin as needed (he reports using it about 5 times a week)  -As noted above he does have stable angina  We addressed the management of atherosclerotic artery disease.  He is on proper antiplatelet, cholesterol management and beta-blockers as appropriate.  We addressed the potential side effects and laboratory follow-up for these medications.     Sick sinus syndrome status post pacemaker  -Last device check on 10/10/2023 demonstrated appropriate function     Hypertension  -Good control, continue current regimen, goal of less than 130/80  -Continue medications as above     Hyperlipidemia  -Most recent LDL 43  -Continue atorvastatin 40 mg daily  -Goal of less than 70  -Check lipid panel      Unplanned weight loss and cyclical vomiting  -Ongoing for the past year  -He does smoke marijuana occasionally, tried 3 weeks without it without change in his symptoms  -At her last visit I placed a referral to GI and dietary, he has seen GI, he has an upcoming endoscopy and colonoscopy     COPD  -Has not seen a pulmonologist since moving to [place], referral placed at her last visit, he has not yet established with them, I reminded him to call and make an appointment with pulmonology    Surgical clearance for endoscopy and colonoscopy  -Patient is likely a low to moderate risk for  endoscopy/colonoscopy  -RCRI score 1, class II risk  -Patient to continue aspirin/antiplatelet therapy as long as possible.  If deemed necessary for surgery, patient may hold ASA/antiplatelet therapy for 5 days prior to surgery, then restart ASAP once cleared after surgery.       Follow-up with JOON Sanchez in 6 months     This note was dictated using Dragon speech recognition software.

## 2024-01-10 ENCOUNTER — TELEPHONE (OUTPATIENT)
Dept: CARDIOLOGY | Facility: MEDICAL CENTER | Age: 69
End: 2024-01-10
Payer: MEDICARE

## 2024-01-10 NOTE — LETTER
PROCEDURE/SURGERY CLEARANCE FORM      Encounter Date: 1/10/2024    Patient: Antony Akbar Sr.  YOB: 1955    CARDIOLOGIST:  DIPIKA Barbosa    REFERRING DOCTOR:  No ref. provider found      The above patient is cleared to have the following procedure/surgery: EGD and Colonoscopy with Deep (Propofol) Sedation                                           Additional comments: -Patient is likely a low to moderate risk for endoscopy/colonoscopy  -RCRI score 1, class II risk  -Patient to continue aspirin/antiplatelet therapy as long as possible.  If deemed necessary for surgery, patient may hold ASA/antiplatelet therapy for 5 days prior to surgery, then restart ASAP once cleared after surgery.        DIPIKA Barbosa  Electronically Signed

## 2024-01-10 NOTE — TELEPHONE ENCOUNTER
Last OV: 1.5.2024  Proposed Surgery: EGD and Colonoscopy with Deep (Propofol) Sedation   Surgery Date: 2.13.2024  Requesting Office Name: [facility]   Fax Number: [phone]  Preference of Location (default is surgery center unless specified by Cardiologist or [facility])  Prior Clearance Addressed: Yes       Anticoags/Antiplatelets: Clopidogrel   Anticoags/Antiplatelet managed by Cardiology? YES    Outstanding Cardiac Imaging : No  Stent, Cardiac Devices, or Catheterization: No  Ablation, TAVR/Valve (including open heart), Cardioversion: No  Recent Cardiac Hospitalization: No            When: Greater than 6 months since hospitalization.   History (cardiac history):   Past Medical History:   Diagnosis Date    Asthma     Back pain     BPH (benign prostatic hyperplasia)     Chronic obstructive pulmonary disease (HCC)     Hypertension     Hypothyroid              Surgical Clearance Letter Sent: YES   **Scan clearance request letter into [facility].**

## 2024-01-11 ENCOUNTER — TELEPHONE (OUTPATIENT)
Dept: CARDIOLOGY | Facility: MEDICAL CENTER | Age: 69
End: 2024-01-11
Payer: MEDICARE

## 2024-01-11 NOTE — TELEPHONE ENCOUNTER
Caller: [place](pt's s/o)      Topic/issue: Patient's s/o called about his upcoming colonoscopy procedure and the prep that was involved in it and what he would need to do. She already reached out to [facility] and was advised to call us to discuss it in more detail      Callback Number: [phone]        Thank you    -Ismael MEJIA

## 2024-01-11 NOTE — TELEPHONE ENCOUNTER
Phone number called: [phone]     Call outcome: Spoke to [place] and patient and she states that patient wanted to be admitted for his GI procedure and that the GI facility directed them to ask us because he has been having ongoing angina with his bowel movements, which is why he is having the colonoscopy. I advised them that we would not be the ones admitting the patient, that would be the performing physician based on their assessment. Patient recently seen by  and cleared for colonoscopy procedure. Advised them to let us know if they need a full cardiac clearance letter sent to the facility and they stated they will let us know. All questions/concerns answered at this time, patient and spouse appreciative of information given.

## 2024-03-08 NOTE — PROGRESS NOTES
Monitoring Summary:  Rhythm: paced 70-71 bpm  Ectopy: none  Measurements: paced           [FreeTextEntry1] :    PHYSICAL EXAMINATION: General appearance - well developed, well nourished, HEENT lack of CN III IV movement Mental status -non verbal  Respiratory - no wheezing heard CHEST: equal expansion upon breathing in Abdomen - gtube site clean Skin - no rash Neurological - Modified Kelli Scale: Tone: [ truncal low tone but no head lagging from supine to sit passively significant low tone in proximal shoulder girdle ] pt shows volitional movement in LEs with hip flexion and knee ext and knee flexion   Musculoskeletal - Range of Motion: Right UE: full Left UE: full Right LE: RLE DF ROM R1-10 and R2 -5 equinovarus and skin purpura on right latearl column of annkle Left LE: LLE DF ROM R1-5 and R2 neutral

## 2024-03-15 ENCOUNTER — HOSPITAL ENCOUNTER (OUTPATIENT)
Dept: RADIOLOGY | Facility: MEDICAL CENTER | Age: 69
End: 2024-03-15
Attending: PHYSICIAN ASSISTANT
Payer: MEDICARE

## 2024-03-15 VITALS — HEART RATE: 85 BPM | OXYGEN SATURATION: 93 %

## 2024-03-15 DIAGNOSIS — R51.9 FACIAL PAIN: ICD-10-CM

## 2024-03-15 PROCEDURE — 70551 MRI BRAIN STEM W/O DYE: CPT

## 2024-03-16 NOTE — PROGRESS NOTES
"MRI NURSING NOTES:    Medtronic iPAD used for Medtronic pacer setting changes today for MRI of brain s contrast.  Medtronic ppm setting to DOO 85.  Pt denies discomfort when asked.  Pt educated when to alert MRI Teach/Imaging RN.  Pt VU.  Pt monitored via HR, EKG, BP, pulse ox during MRI scan.  Pt tolerated MRI well.    Pre-mri settings restored p MRI scan per Medtronic iPAD.   Pt updated. VU.      Medtronic ppm printed report received from [facility] [employer].  Report received and have been scanned in chart under \"Media\" tab.     "

## 2024-04-12 ENCOUNTER — NON-PROVIDER VISIT (OUTPATIENT)
Dept: CARDIOLOGY | Facility: MEDICAL CENTER | Age: 69
End: 2024-04-12

## 2024-04-12 ENCOUNTER — NON-PROVIDER VISIT (OUTPATIENT)
Dept: CARDIOLOGY | Facility: MEDICAL CENTER | Age: 69
End: 2024-04-12
Attending: FAMILY MEDICINE
Payer: MEDICARE

## 2024-04-12 DIAGNOSIS — I49.5 SICK SINUS SYNDROME (HCC): ICD-10-CM

## 2024-04-12 DIAGNOSIS — Z95.0 CARDIAC PACEMAKER IN SITU: ICD-10-CM

## 2024-04-12 PROCEDURE — 93280 PM DEVICE PROGR EVAL DUAL: CPT | Mod: 26 | Performed by: INTERNAL MEDICINE

## 2024-04-12 PROCEDURE — 93280 PM DEVICE PROGR EVAL DUAL: CPT | Performed by: INTERNAL MEDICINE

## 2024-04-12 NOTE — PROGRESS NOTES
Device is functioning appropriately.  No changes made today.  Requested transfer of home monitor from previous clinic.  Reviewed home monitoring and billing with patient--verbalized understanding.

## 2024-04-16 NOTE — CARDIAC REMOTE MONITOR - SCAN
Device transmission reviewed. Device demonstrated appropriate function.       Electronically Signed by: Antony Figueredo M.D.    4/17/2024  7:59 AM

## 2024-04-18 ENCOUNTER — APPOINTMENT (OUTPATIENT)
Dept: ADMISSIONS | Facility: MEDICAL CENTER | Age: 69
End: 2024-04-18
Attending: INTERNAL MEDICINE
Payer: MEDICARE

## 2024-04-29 ENCOUNTER — PRE-ADMISSION TESTING (OUTPATIENT)
Dept: ADMISSIONS | Facility: MEDICAL CENTER | Age: 69
End: 2024-04-29
Attending: INTERNAL MEDICINE
Payer: MEDICARE

## 2024-04-29 VITALS — BODY MASS INDEX: 24.06 KG/M2 | HEIGHT: 68 IN

## 2024-04-29 DIAGNOSIS — Z01.810 PRE-OPERATIVE CARDIOVASCULAR EXAMINATION: ICD-10-CM

## 2024-04-29 DIAGNOSIS — Z01.812 PRE-OPERATIVE LABORATORY EXAMINATION: ICD-10-CM

## 2024-04-29 RX ORDER — POTASSIUM CHLORIDE 20 MEQ/1
20 TABLET, EXTENDED RELEASE ORAL NIGHTLY
COMMUNITY
Start: 2024-04-01

## 2024-04-29 NOTE — PREPROCEDURE INSTRUCTIONS
PreAdmit Telephone Appointment: Reviewed the Preparing for your procedure handout with patient over the phone. Patient instructed per pharmacy guidelines regarding taking, holding or contacting provider for instructions on regularly prescribed medications before surgery. Instructed to take the following medications the day of surgery with a sip of water per pharmacy medication guidelines: albuterol prn, xanax prn, Wellbutrin, coreg, flexeril prn, gabapentin, imdur, synthroid, ms contin prn, nitrostat prn, Zofran prn. Medication instructions emailed to patient.    Confirmed with patient where to check in morning of surgery. Handouts/Brochure/Video emailed to patient.    Testing to be done at [facility] from 4/29 to 5/2. Orders faxed.

## 2024-09-03 ENCOUNTER — OFFICE VISIT (OUTPATIENT)
Dept: CARDIOLOGY | Facility: MEDICAL CENTER | Age: 69
End: 2024-09-03
Payer: MEDICARE

## 2024-09-03 VITALS
HEIGHT: 67 IN | OXYGEN SATURATION: 98 % | WEIGHT: 142 LBS | SYSTOLIC BLOOD PRESSURE: 102 MMHG | BODY MASS INDEX: 22.29 KG/M2 | HEART RATE: 82 BPM | DIASTOLIC BLOOD PRESSURE: 62 MMHG | RESPIRATION RATE: 18 BRPM

## 2024-09-03 DIAGNOSIS — Z95.1 S/P 2-VESSEL CORONARY ARTERY BYPASS: ICD-10-CM

## 2024-09-03 DIAGNOSIS — I25.708 CORONARY ARTERY DISEASE INVOLVING CORONARY BYPASS GRAFT WITH OTHER FORMS OF ANGINA PECTORIS, UNSPECIFIED WHETHER NATIVE OR TRANSPLANTED HEART (HCC): ICD-10-CM

## 2024-09-03 DIAGNOSIS — I49.5 SICK SINUS SYNDROME (HCC): ICD-10-CM

## 2024-09-03 DIAGNOSIS — I20.9 ANGINA PECTORIS (HCC): ICD-10-CM

## 2024-09-03 DIAGNOSIS — I70.0 AORTIC ATHEROSCLEROSIS (HCC): ICD-10-CM

## 2024-09-03 DIAGNOSIS — I24.0 ISCHEMIC HEART DISEASE DUE TO CORONARY ARTERY OBSTRUCTION (HCC): ICD-10-CM

## 2024-09-03 DIAGNOSIS — Z95.5 HISTORY OF PLACEMENT OF STENT IN LAD CORONARY ARTERY: ICD-10-CM

## 2024-09-03 DIAGNOSIS — I10 PRIMARY HYPERTENSION: ICD-10-CM

## 2024-09-03 DIAGNOSIS — Z95.0 CARDIAC PACEMAKER IN SITU: ICD-10-CM

## 2024-09-03 DIAGNOSIS — E78.5 HYPERLIPIDEMIA, UNSPECIFIED HYPERLIPIDEMIA TYPE: ICD-10-CM

## 2024-09-03 DIAGNOSIS — R63.4 WEIGHT LOSS: ICD-10-CM

## 2024-09-03 DIAGNOSIS — R11.15 CYCLICAL VOMITING: ICD-10-CM

## 2024-09-03 DIAGNOSIS — I20.0 UNSTABLE ANGINA (HCC): ICD-10-CM

## 2024-09-03 DIAGNOSIS — J44.9 CHRONIC OBSTRUCTIVE PULMONARY DISEASE, UNSPECIFIED COPD TYPE (HCC): ICD-10-CM

## 2024-09-03 DIAGNOSIS — I25.9 ISCHEMIC HEART DISEASE DUE TO CORONARY ARTERY OBSTRUCTION (HCC): ICD-10-CM

## 2024-09-03 PROCEDURE — 3074F SYST BP LT 130 MM HG: CPT

## 2024-09-03 PROCEDURE — 99214 OFFICE O/P EST MOD 30 MIN: CPT

## 2024-09-03 PROCEDURE — 99213 OFFICE O/P EST LOW 20 MIN: CPT

## 2024-09-03 PROCEDURE — 3078F DIAST BP <80 MM HG: CPT

## 2024-09-03 RX ORDER — ATORVASTATIN CALCIUM 40 MG/1
40 TABLET, FILM COATED ORAL NIGHTLY
Qty: 100 TABLET | Refills: 3 | Status: SHIPPED | OUTPATIENT
Start: 2024-09-03

## 2024-09-03 RX ORDER — NITROGLYCERIN 0.4 MG/1
0.4 TABLET SUBLINGUAL
Qty: 30 TABLET | Refills: 11 | Status: SHIPPED | OUTPATIENT
Start: 2024-09-03

## 2024-09-03 RX ORDER — LISINOPRIL 40 MG/1
20 TABLET ORAL 2 TIMES DAILY
Qty: 100 TABLET | Refills: 3 | Status: SHIPPED | OUTPATIENT
Start: 2024-09-03

## 2024-09-03 RX ORDER — CARVEDILOL 25 MG/1
25 TABLET ORAL 2 TIMES DAILY WITH MEALS
Qty: 200 TABLET | Refills: 3 | Status: SHIPPED | OUTPATIENT
Start: 2024-09-03

## 2024-09-03 RX ORDER — ISOSORBIDE MONONITRATE 120 MG/1
120 TABLET, EXTENDED RELEASE ORAL 2 TIMES DAILY
Qty: 200 TABLET | Refills: 3 | Status: SHIPPED | OUTPATIENT
Start: 2024-09-03

## 2024-09-03 RX ORDER — CLOPIDOGREL BISULFATE 75 MG/1
75 TABLET ORAL EVERY MORNING
Qty: 100 TABLET | Refills: 3 | Status: SHIPPED | OUTPATIENT
Start: 2024-09-03

## 2024-09-03 ASSESSMENT — ENCOUNTER SYMPTOMS
PND: 0
VOMITING: 1
PALPITATIONS: 0
NEUROLOGICAL NEGATIVE: 1
WEIGHT LOSS: 1
EYES NEGATIVE: 1
MUSCULOSKELETAL NEGATIVE: 1
SHORTNESS OF BREATH: 0
ORTHOPNEA: 0
NERVOUS/ANXIOUS: 0
DEPRESSION: 0

## 2024-09-03 NOTE — PROGRESS NOTES
"Chief Complaint   Patient presents with    Lightheadedness    Hypertension    Hyperlipidemia       Subjective     Antony Akbar Sr. is a 69 y.o. male who presents today for follow up. They have a history of hypothyroidism, COPD, HTN, CAD/PCI s/p CABG 2012, carotid stenosis, PVD AGUSTIN s/p PCI, HLD, SSS s/p PPM, additionally he had an admission in January 2022 for an NSTEMI, underwent left heart cath without intervention due to preserved flow     Seen by Dr. Laurent on 11/22/2022, that visit he has been intolerant to Ranexa, he was having ongoing chest pressure with exertion, his Imdur dose was increased, recommended to get pacemaker check     Seen by myself on 9/19/2023 he is not doing well, he has been having vomiting every day for about a year, he has also had unplanned weight loss as a result.  He says that his pacer feels like it has migrated laterally, sometimes feels a \"shock.\" He had been put on hospice when he lived in [place] \"because of my heart.\"  He has COPD but has not been to a pulmonologist since moving to [place]. He does have stable angina, relieved with Imdur and sublingual nitroglycerin, mild lower extremity edema. He uses four-wheel walker, able to walk for about 20 minutes before having to stop and rest.      Seen by myself on 1/5/2024 he was able to follow up with GI, he is going to be getting a colonoscopy and an endoscopy, tentatively scheduled for 2/14/2024. Occasional chest pain, with activity. He is able to walk with his 4 wheel walker for about 20 minutes.     Today 9/3/2024 He was not able to get in to see GI, he continues to have cyclical vomiting. He has occasional chest pains relieve by nitro. He does have occasional shortness of breath.    Past Medical History:   Diagnosis Date    Acute nasopharyngitis     residual congestion since Nov 2023    Adrenal mass (HCC)     per epic- patient and wife decline hx    Anesthesia     PONV hx a long time ago    Angina pectoris (HCC)     " Anginal syndrome (HCC)     Anxiety     Aortic atherosclerosis (HCC)     Arthritis     osteo    Asthma     Back pain     Bowel habit changes     consipation    BPH (benign prostatic hyperplasia)     Cardiac pacemaker in situ     Chronic obstructive pulmonary disease (HCC)     Closed left hip fracture (HCC)     Congestive heart failure (HCC)     Dental disorder     bottom partial    Fatty liver     GERD (gastroesophageal reflux disease)     Heart burn     Hiatus hernia syndrome     High cholesterol     History of placement of stent in LAD coronary artery     HLD (hyperlipidemia)     Hypertension     Hypothyroid     Indigestion     Ischemic heart disease due to coronary artery obstruction (HCC)     Macular degeneration     Left eye    Mitral prolapse     Myocardial infarct ([facility])     prior to 2013    Pacemaker     [facility]    PAD (peripheral artery disease) ([facility])     PONV (postoperative nausea and vomiting)     Renal disorder     renal stent, Left to improve blood pressure    S/P 2-vessel coronary artery bypass     Sick sinus syndrome ([facility])     Sleep apnea     no device    Stroke ([facility])     x2, chronic episodes of pain- followed by neurologist    Unstable angina ([facility])      Past Surgical History:   Procedure Laterality Date    PB OPEN FIX INTER/SUBTROCH FX,IMPLNT Left 08/04/2021    Procedure: INSERTION, INTRAMEDULLARY MIKAEL, FEMUR, PROXIMAL;  Surgeon: Valentin Ríos M.D.;  Location: SURGERY [facility];  Service: Orthopedics    PACEMAKER INSERTION Left 12/04/2020    Generator replacement with [employer] Corrine XT DR URIAS [id number] implanted in [place]. Original device implanted in 2011.    CARDIAC CATH, RIGHT/LEFT HEART      5 stents, double bypass    CORONART ARTERY BYPASS, 2      STENT PLACEMENT Left     Renal     History reviewed. No pertinent family history.  Social History     Socioeconomic History    Marital status: Single     Spouse name: Not on file    Number of children: Not on file    Years of education:  Not on file    Highest education level: Not on file   Occupational History    Not on file   Tobacco Use    Smoking status: Former     Current packs/day: 0.00     Types: Cigarettes     Quit date: 1995     Years since quittin.6    Smokeless tobacco: Never   Vaping Use    Vaping status: Never Used   Substance and Sexual Activity    Alcohol use: Not Currently    Drug use: Yes     Comment: CBD oil topical    Sexual activity: Not on file   Other Topics Concern    Not on file   Social History Narrative    Not on file     Social Determinants of Health     Financial Resource Strain: Not on file   Food Insecurity: Not on file   Transportation Needs: Not on file   Physical Activity: Not on file   Stress: Not on file   Social Connections: Not on file   Intimate Partner Violence: Not on file   Housing Stability: Not on file     Allergies   Allergen Reactions    Bee Venom Anaphylaxis     Outpatient Encounter Medications as of 9/3/2024   Medication Sig Dispense Refill    potassium chloride SA (KDUR) 20 MEQ Tab CR Take 20 mEq by mouth every evening.      ALPRAZolam (XANAX) 0.25 MG Tab Take 0.25 mg by mouth 1 time a day as needed.      famotidine (PEPCID) 40 MG Tab Take 40 mg by mouth every evening.      atorvastatin (LIPITOR) 40 MG Tab Take 1 Tablet by mouth every day. (Patient taking differently: Take 40 mg by mouth every evening.) 100 Tablet 3    carvedilol (COREG) 25 MG Tab Take 1 Tablet by mouth 2 times a day with meals. (Patient taking differently: Take 25 mg by mouth every day.) 200 Tablet 3    clopidogrel (PLAVIX) 75 MG Tab Take 1 Tablet by mouth every morning. 100 Tablet 3    isosorbide mononitrate (IMDUR) 120 MG CR tablet Take 1 Tablet by mouth 2 times a day. 200 Tablet 3    lisinopril (PRINIVIL) 40 MG tablet Take 0.5 Tablets by mouth 2 times a day. (Patient taking differently: Take 20 mg by mouth every day.) 100 Tablet 3    nitroglycerin (NITROSTAT) 0.4 MG SL Tab Place 1 Tablet under the tongue 1 time a day as  "needed for Chest Pain (chest pain). 30 Tablet 11    buPROPion SR (WELLBUTRIN-SR) 150 MG TABLET SR 12 HR sustained-release tablet Take 150 mg by mouth 2 times a day.      ondansetron (ZOFRAN ODT) 4 MG TABLET DISPERSIBLE Take 4 mg by mouth every 6 hours as needed.      GNP CLEARLAX 17 GM/SCOOP Powder Take 17 g by mouth 2 times a day.      levothyroxine (SYNTHROID) 50 MCG Tab Take 50 mcg by mouth every morning on an empty stomach.      morphine ER (MS CONTIN) 15 MG Tab CR tablet Take 15 mg by mouth 3 times a day as needed (Pain).      cyclobenzaprine (FLEXERIL) 10 mg Tab Take 1 Tablet by mouth 3 times a day as needed for Moderate Pain. 30 Tablet 0    tamsulosin (FLOMAX) 0.4 MG capsule Take 1 Capsule by mouth every evening. 90 Capsule 2    gabapentin (NEURONTIN) 100 MG Cap Take 100 mg by mouth 4 times a day.      EPINEPHrine (EPIPEN) 0.3 MG/0.3ML Solution Auto-injector solution for injection Inject 0.3 mg into the shoulder, thigh, or buttocks as needed.      albuterol 108 (90 Base) MCG/ACT Aero Soln inhalation aerosol Inhale 2 Puffs every 6 hours as needed for Shortness of Breath.       No facility-administered encounter medications on file as of 9/3/2024.     Review of Systems   Constitutional:  Positive for weight loss.   HENT: Negative.     Eyes: Negative.    Respiratory:  Negative for shortness of breath.    Cardiovascular:  Positive for chest pain. Negative for palpitations, orthopnea, leg swelling and PND.   Gastrointestinal:  Positive for vomiting.   Genitourinary: Negative.    Musculoskeletal: Negative.    Skin: Negative.    Neurological: Negative.    Endo/Heme/Allergies: Negative.    Psychiatric/Behavioral:  Negative for depression. The patient is not nervous/anxious.               Objective     /62 (BP Location: Left arm, Patient Position: Sitting, BP Cuff Size: Adult)   Pulse 82   Resp 18   Ht 1.702 m (5' 7\")   Wt 64.4 kg (142 lb)   SpO2 98%   BMI 22.24 kg/m²     Physical Exam  Constitutional:     "   Appearance: Normal appearance.   HENT:      Head: Normocephalic.   Neck:      Vascular: No JVD.   Cardiovascular:      Rate and Rhythm: Normal rate and regular rhythm.      Pulses: Normal pulses.      Heart sounds: Normal heart sounds. No murmur heard.     No friction rub.   Pulmonary:      Effort: Pulmonary effort is normal.      Breath sounds: Normal breath sounds.   Abdominal:      Palpations: Abdomen is soft.   Musculoskeletal:         General: Normal range of motion.      Right lower leg: No edema.      Left lower leg: No edema.   Skin:     General: Skin is warm and dry.   Neurological:      General: No focal deficit present.      Mental Status: He is alert and oriented to person, place, and time.   Psychiatric:         Mood and Affect: Mood normal.         Behavior: Behavior normal.            Lab Results   Component Value Date/Time    WBC 11.2 (H) 01/05/2022 02:03 AM    RBC 5.03 01/05/2022 02:03 AM    HEMOGLOBIN 12.7 (L) 01/05/2022 02:03 AM    HEMATOCRIT 41.3 (L) 01/05/2022 02:03 AM    MCV 82.1 01/05/2022 02:03 AM    MCH 25.2 (L) 01/05/2022 02:03 AM    MCHC 30.8 (L) 01/05/2022 02:03 AM    MPV 10.9 01/05/2022 02:03 AM    NEUTSPOLYS 66.40 01/05/2022 02:03 AM    LYMPHOCYTES 22.00 01/05/2022 02:03 AM    MONOCYTES 6.70 01/05/2022 02:03 AM    EOSINOPHILS 3.80 01/05/2022 02:03 AM    BASOPHILS 0.70 01/05/2022 02:03 AM      Lab Results   Component Value Date/Time    CHOLSTRLTOT 129 12/28/2023 12:00 AM    LDL 64 12/28/2023 12:00 AM    HDL 55 12/28/2023 12:00 AM    TRIGLYCERIDE 52 12/28/2023 12:00 AM       Lab Results   Component Value Date/Time    SODIUM 137 01/06/2022 05:22 AM    POTASSIUM 4.0 01/06/2022 05:22 AM    CHLORIDE 104 01/06/2022 05:22 AM    CO2 20 01/06/2022 05:22 AM    GLUCOSE 71 01/06/2022 05:22 AM    BUN 8 01/06/2022 05:22 AM    CREATININE 0.88 01/06/2022 05:22 AM     Lab Results   Component Value Date/Time    ALKPHOSPHAT 110 (H) 01/04/2022 03:05 AM    ASTSGOT 14 01/04/2022 03:05 AM    ALTSGPT 8  01/04/2022 03:05 AM    TBILIRUBIN 0.6 01/04/2022 03:05 AM          Assessment & Plan     No diagnosis found.    Medical Decision Making: Today's Assessment/Status/Plan:        Coronary Artery Disease and aortic atherosclerosis  With history of bypass x2, and stent placement  - continue Plavix 75 mg daily, carvedilol 25 mg twice daily, Lipitor 40 mg daily, Imdur 120 mg twice daily, sublingual nitroglycerin as needed (he reports using it a few times a week)  -As noted above he does have stable angina   We addressed the management of atherosclerotic artery disease.  He is on proper antiplatelet, cholesterol management and beta-blockers as appropriate.  We addressed the potential side effects and laboratory follow-up for these medications.     Sick sinus syndrome status post pacemaker  -Last device check on 4/12/2024 demonstrated appropriate function      Hypertension  -Good control, continue current regimen, goal of less than 130/80  -Continue medications as above     Hyperlipidemia  -Most recent LDL 43  -Continue atorvastatin 40 mg daily  -Goal of less than 70  -Check lipid panel      Unplanned weight loss and cyclical vomiting   -Ongoing for over a year, he has lost around 110 pounds  -He has quit smoking marijuana  -At her last visit I placed a referral to GI and dietary, he was not able to get his endoscopy or colonoscopy, I have placed a new referral to GI and dietary, he has been having a lot of issues with getting appointment times that work for him     COPD   -Has not seen a pulmonologist since moving to [place], referral placed at her last visit, he has not yet established with them, I reminded him to call and make an appointment with pulmonology    He reports that he had labs done at [facility], I will request these        Follow-up with JOON Sanchez in 6 months     This note was dictated using Dragon speech recognition software.

## 2024-09-03 NOTE — THERAPY
During her hospital stay, her verapamil dosage was increased to 240 mg due to elevated blood pressure readings, which were around 170/80.   Recreational Therapy  Daily Treatment     Patient Name: Antony Akbar  AGE:  66 y.o., SEX:  male  Medical Record #: [id number]  Today's Date: 8/11/2021       Subjective    Pt agreeing to the plan of standing while playing during the next session.      Objective       08/11/21 1001   Functional Ability Status - Cognitive   Attention Span Remains on Task   Comprehension Follows Three Step Commands   Judgment Able to Make Independent Decisions   Functional Ability Status - Emotional    Affect Appropriate;Bright   Mood Appropriate   Behavior Appropriate   Skilled Intervention    Skilled Intervention Gross Motor Leisure;Relaxation / Coping Skills   Skilled Intervention Comments [employer]   Interdisciplinary Plan of Care Collaboration   Patient Position at End of Therapy Seated;Call Light within Reach;Tray Table within Reach   Strengths & Barriers   Strengths Able to follow instructions;Alert and oriented;Motivated for self care and independence;Pleasant and cooperative;Willingly participates in therapeutic activities   Barriers Decreased endurance;Fatigue;Impaired activity tolerance;Impaired balance   Treatment Time   Total Time Spent (mins) 30   Procedural Tracking   Procedural Tracking Community Re-Integration;Community Skills Development;Leisure Skills Awareness;Leisure Skills Development;Gross Motor Functional Leisure Skills       Assessment    Pt able to play the [employer] game without issue.     Strengths: (P) Able to follow instructions, Alert and oriented, Motivated for self care and independence, Pleasant and cooperative, Willingly participates in therapeutic activities  Barriers: (P) Decreased endurance, Fatigue, Impaired activity tolerance, Impaired balance    Plan    Sanding balance and endurance while weight shifting in standing during the [employer] game.     Passport items to be completed:  Passport items to be completed:  Verbalize two positive leisure activities, discuss returning to work, hobbies,  community groups or volunteer activities, explore community resources

## 2024-12-12 ENCOUNTER — TELEPHONE (OUTPATIENT)
Dept: HEALTH INFORMATION MANAGEMENT | Facility: OTHER | Age: 69
End: 2024-12-12
Payer: MEDICARE

## 2025-01-22 ENCOUNTER — TELEPHONE (OUTPATIENT)
Dept: CARDIOLOGY | Facility: MEDICAL CENTER | Age: 70
End: 2025-01-22
Payer: MEDICARE

## 2025-01-22 NOTE — TELEPHONE ENCOUNTER
Called patient back and talked to [place] who confirmed they would like to have remote monitoring but they never received the monitor when device was implanted. I will order new monitor and update [employer] that patient never received old one. [place] understood, had no further questions and was appreciative of call.

## 2025-01-22 NOTE — TELEPHONE ENCOUNTER
Caller:  Antony Akbar    Topic/issue:   Antony keeps getting letters that he has received a heart monitor and never returned it. He would like a call back.     Callback Number:   [phone]    Thank you,   Katie ELDRIDGE

## 2025-02-11 ENCOUNTER — NON-PROVIDER VISIT (OUTPATIENT)
Dept: CARDIOLOGY | Facility: MEDICAL CENTER | Age: 70
End: 2025-02-11
Payer: MEDICARE

## 2025-02-11 PROCEDURE — 93294 REM INTERROG EVL PM/LDLS PM: CPT | Performed by: INTERNAL MEDICINE

## 2025-03-18 ENCOUNTER — TELEPHONE (OUTPATIENT)
Dept: CARDIOLOGY | Facility: MEDICAL CENTER | Age: 70
End: 2025-03-18
Payer: MEDICARE

## (undated) DEVICE — SUTURE GENERAL

## (undated) DEVICE — STAPLER SKIN DISP - (6/BX 10BX/CA) VISISTAT

## (undated) DEVICE — SUTURE 2-0 VICRYL PLUS CT-1 - 8 X 18 INCH(12/BX)

## (undated) DEVICE — BIT DRILL LONG CALIBRATED 4.2MM X 330MM (4TX2=8)

## (undated) DEVICE — GLOVE BIOGEL PI INDICATOR SZ 6.5 SURGICAL PF LF - (50/BX 4BX/CA)

## (undated) DEVICE — GOWN WARMING STANDARD FLEX - (30/CA)

## (undated) DEVICE — HEAD HOLDER JUNIOR/ADULT

## (undated) DEVICE — CANISTER SUCTION 3000ML MECHANICAL FILTER AUTO SHUTOFF MEDI-VAC NONSTERILE LF DISP  (40EA/CA)

## (undated) DEVICE — SET EXTENSION WITH 2 PORTS (48EA/CA) ***PART #2C8610 IS A SUBSTITUTE*****

## (undated) DEVICE — ELECTRODE 850 FOAM ADHESIVE - HYDROGEL RADIOTRNSPRNT (50/PK)

## (undated) DEVICE — GLOVE BIOGEL INDICATOR SZ 8 SURGICAL PF LTX - (50/BX 4BX/CA)

## (undated) DEVICE — DRESSING TRANSPARENT FILM TEGADERM 4 X 4.75" (50EA/BX)"

## (undated) DEVICE — SENSOR SPO2 NEO LNCS ADHESIVE (20/BX) SEE USER NOTES

## (undated) DEVICE — TUBE CONNECT SUCTION CLEAR 120 X 1/4" (50EA/CA)"

## (undated) DEVICE — PENCIL ELECTSURG 10FT BTN SWH - (50/CA)

## (undated) DEVICE — SET LEADWIRE 5 LEAD BEDSIDE DISPOSABLE ECG (1SET OF 5/EA)

## (undated) DEVICE — TOWELS CLOTH SURGICAL - (4/PK 20PK/CA)

## (undated) DEVICE — TUBING CLEARLINK DUO-VENT - C-FLO (48EA/CA)

## (undated) DEVICE — SUCTION INSTRUMENT YANKAUER BULBOUS TIP W/O VENT (50EA/CA)

## (undated) DEVICE — SLEEVE, VASO, THIGH, MED

## (undated) DEVICE — LACTATED RINGERS INJ 1000 ML - (14EA/CA 60CA/PF)

## (undated) DEVICE — PACK MAJOR ORTHO - (2EA/CA)

## (undated) DEVICE — NEPTUNE 4 PORT MANIFOLD - (20/PK)

## (undated) DEVICE — PROTECTOR ULNA NERVE - (36PR/CA)

## (undated) DEVICE — GLOVE SZ 6.5 BIOGEL PI MICRO - PF LF (50PR/BX)

## (undated) DEVICE — DRAPE 36X28IN RAD CARM BND BG - (25/CA) O

## (undated) DEVICE — MASK ANESTHESIA ADULT  - (100/CA)

## (undated) DEVICE — KIT ANESTHESIA W/CIRCUIT & 3/LT BAG W/FILTER (20EA/CA)

## (undated) DEVICE — DRAPE LARGE 3 QUARTER - (20/CA)

## (undated) DEVICE — GUIDE PIN CALIBRATED (5EA/PK) (4TX6=24)

## (undated) DEVICE — TOWEL STOP TIMEOUT SAFETY FLAG (40EA/CA)

## (undated) DEVICE — SUTURE 0 VICRYL PLUS CT-1 - 8 X 18 INCH (12/BX)

## (undated) DEVICE — GLOVE BIOGEL SZ 7.5 SURGICAL PF LTX - (50PR/BX 4BX/CA)

## (undated) DEVICE — SUCTION INSTRUMENT YANKAUER OPEN TIP W/O VENT (50EA/CA)